# Patient Record
Sex: FEMALE | Race: WHITE | NOT HISPANIC OR LATINO | Employment: OTHER | URBAN - METROPOLITAN AREA
[De-identification: names, ages, dates, MRNs, and addresses within clinical notes are randomized per-mention and may not be internally consistent; named-entity substitution may affect disease eponyms.]

---

## 2017-05-17 ENCOUNTER — TELEPHONE (OUTPATIENT)
Dept: HEMATOLOGY ONCOLOGY | Facility: MEDICAL CENTER | Age: 77
End: 2017-05-17

## 2017-05-17 NOTE — TELEPHONE ENCOUNTER
Left voicemail for patient asking her to call me back at medical oncology. I am trying to find out who she was previously seen by for her cancer diagnosis so we may request medical records. Once records are received, patient needs to be scheduled for an oncology new patient appointment with Dr. Grubbs.     Ref: Joshua Garcia  Dx: Personal history of other malignant neoplasm of bronchus and lung,  Personal history of pulmonary embolism,  Cough, dyspnea

## 2017-05-18 NOTE — TELEPHONE ENCOUNTER
Left second voicemail for patient asking her to return my call at medical oncology. Our office did receive her prior medical records and she needs to be scheduled for an oncology new patient appt with Dr. Grubbs.  Ref: Justing Jose  Dx: person history of other malignant neoplasm of bronchus and lung,  Personal history of pulmonary embolism,  Cough, dyspnea

## 2017-05-24 NOTE — TELEPHONE ENCOUNTER
Third voicemail was left for patient asking her to return my call at medical oncology. She needs to be scheduled for an oncology new patient appointment with Dr. Grubbs. A letter will also be mailed to the patient as we have not been able to reach her by phone. Referring provider will also be notified.      Ref: Joshua Garcia  Dx: person histpry of other malignant neoplasm of bronchus and lung,  Person history of pulmonary embolism,  Cough, dyspnea

## 2017-05-31 ENCOUNTER — HOSPITAL ENCOUNTER (OUTPATIENT)
Dept: RADIOLOGY | Facility: MEDICAL CENTER | Age: 77
End: 2017-05-31
Attending: INTERNAL MEDICINE
Payer: MEDICARE

## 2017-05-31 DIAGNOSIS — C34.2 MALIGNANT NEOPLASM OF MIDDLE LOBE, BRONCHUS OR LUNG (HCC): ICD-10-CM

## 2017-05-31 DIAGNOSIS — R91.8 LUNG MASS: ICD-10-CM

## 2017-05-31 PROCEDURE — A9552 F18 FDG: HCPCS

## 2017-06-23 ENCOUNTER — HOSPITAL ENCOUNTER (OUTPATIENT)
Dept: LAB | Facility: MEDICAL CENTER | Age: 77
End: 2017-06-23
Attending: INTERNAL MEDICINE
Payer: MEDICARE

## 2017-06-23 LAB
APPEARANCE UR: CLEAR
BACTERIA #/AREA URNS HPF: ABNORMAL /HPF
BILIRUB UR QL STRIP.AUTO: NEGATIVE
COLOR UR: YELLOW
EPI CELLS #/AREA URNS HPF: ABNORMAL /HPF
GLUCOSE UR STRIP.AUTO-MCNC: NEGATIVE MG/DL
KETONES UR STRIP.AUTO-MCNC: NEGATIVE MG/DL
LEUKOCYTE ESTERASE UR QL STRIP.AUTO: ABNORMAL
MICRO URNS: ABNORMAL
NITRITE UR QL STRIP.AUTO: NEGATIVE
PH UR STRIP.AUTO: 6 [PH]
PROT UR QL STRIP: NEGATIVE MG/DL
RBC # URNS HPF: ABNORMAL /HPF
RBC UR QL AUTO: NEGATIVE
SP GR UR STRIP.AUTO: 1.01
WBC #/AREA URNS HPF: ABNORMAL /HPF

## 2017-06-23 PROCEDURE — 81001 URINALYSIS AUTO W/SCOPE: CPT

## 2017-06-23 PROCEDURE — 87086 URINE CULTURE/COLONY COUNT: CPT

## 2017-06-25 LAB
BACTERIA UR CULT: NORMAL
SIGNIFICANT IND 70042: NORMAL
SOURCE SOURCE: NORMAL

## 2017-07-11 ENCOUNTER — OFFICE VISIT (OUTPATIENT)
Dept: HEMATOLOGY ONCOLOGY | Facility: MEDICAL CENTER | Age: 77
End: 2017-07-11
Payer: MEDICARE

## 2017-07-11 VITALS
OXYGEN SATURATION: 92 % | WEIGHT: 146.28 LBS | DIASTOLIC BLOOD PRESSURE: 86 MMHG | BODY MASS INDEX: 26.92 KG/M2 | TEMPERATURE: 97.6 F | HEART RATE: 67 BPM | RESPIRATION RATE: 16 BRPM | SYSTOLIC BLOOD PRESSURE: 132 MMHG | HEIGHT: 62 IN

## 2017-07-11 DIAGNOSIS — C34.90 NON-SMALL CELL LUNG CANCER (NSCLC) (HCC): ICD-10-CM

## 2017-07-11 PROCEDURE — 99203 OFFICE O/P NEW LOW 30 MIN: CPT | Performed by: INTERNAL MEDICINE

## 2017-07-11 ASSESSMENT — PAIN SCALES - GENERAL: PAINLEVEL: 7=MODERATE-SEVERE PAIN

## 2017-07-11 NOTE — MR AVS SNAPSHOT
"Chauis Bhanu   2017 2:00 PM   Office Visit   MRN: [id number]    Department:  [facility]   Dept Phone:  [phone]    Description:  Female : 1940   Provider:  Bill Grubbs M.D.           Reason for Visit     New Patient           Allergies as of 2017     Allergen Noted Reactions    Tetracycline 2009   Anaphylaxis, Hives      You were diagnosed with     Non-small cell lung cancer (NSCLC) (CMS-HCC)   [[id number]]         Vital Signs     Blood Pressure Pulse Temperature Respirations Height Weight    132/86 mmHg 67 36.4 °C (97.6 °F) 16 1.575 m (5' 2\") 66.35 kg (146 lb 4.4 oz)    Body Mass Index Oxygen Saturation Smoking Status             26.75 kg/m2 92% Former Smoker         Basic Information     Date Of Birth Sex Race Ethnicity Preferred Language    1940 Female White Non- English      Your appointments     2017 10:00 AM   CT BODY WITH with [address] ED [address] 1   [facility] [address]    [address] [facility] [address] [phone]           Some exams require specific prep instructions that would have been given to you at time of scheduling. If you have any additional questions about the prep instructions, please call Imaging Scheduling at [phone] and press #2.            2017 11:20 AM   ONCOLOGY EST PATIENT 30 MIN with Bill Grubbs M.D.   [facility] (--)    [address]  [facility] [address] [phone]              Problem List              ICD-10-CM Priority Class Noted - Resolved    Non-small cell lung cancer (NSCLC) (CMS-HCC) C34.90   2017 - Present      Health Maintenance        Date Due Completion Dates    IMM DTaP/Tdap/Td Vaccine (1 - Tdap) 1958 ---    PAP SMEAR 1960 ---    MAMMOGRAM 1979 ---    COLONOSCOPY 1989 ---    IMM ZOSTER VACCINE 1999 ---    BONE DENSITY 2004 ---    IMM PNEUMOCOCCAL 65+ (ADULT) LOW/MEDIUM RISK SERIES (1 of 2 - PCV13) 2004 ---  "    IMM INFLUENZA (1) 9/1/2017 ---            Current Immunizations     No immunizations on file.      Below and/or attached are the medications your provider expects you to take. Review all of your home medications and newly ordered medications with your provider and/or pharmacist. Follow medication instructions as directed by your provider and/or pharmacist. Please keep your medication list with you and share with your provider. Update the information when medications are discontinued, doses are changed, or new medications (including over-the-counter products) are added; and carry medication information at all times in the event of emergency situations     Allergies:  TETRACYCLINE - Anaphylaxis,Hives               Medications  Valid as of: July 11, 2017 -  3:22 PM    Generic Name Brand Name Tablet Size Instructions for use    Folic Acid (Tab) FOLVITE 400 MCG Take  by mouth 3 times a day.        LevoFLOXacin (Tab) LEVAQUIN 250 MG Take 3 Tabs by mouth every day.        Levothyroxine Sodium (Tab) SYNTHROID 50 MCG Take  by mouth every day.        Methadone HCl (Tab) DOLOPHINE 10 MG Take  by mouth 3 times a day as needed for Mild Pain.        Methotrexate Sodium (Tab) methotrexate 2.5 MG Take 10 Tabs by mouth every 7 days.        Metoprolol Succinate (TABLET SR 24 HR) TOPROL XL 50 MG Take  by mouth 2 Times a Day.        Metoprolol Tartrate (Tab) LOPRESSOR 25 MG Take 1 Tab by mouth 2 times a day.        Morphine Sulfate (TABLET SR 12 HR) MS CONTIN 30 MG Take  by mouth 3 times a day.        Multiple Vitamin   Take  by mouth every day.        Oxycodone-Acetaminophen (Tab) PERCOCET  MG Take  by mouth 4 times a day.        PARoxetine HCl (Tab) PAXIL 20 MG Take  by mouth every day.        Rivaroxaban (Tab) XARELTO 20 MG Take 20 mg by mouth with dinner.        .                 Medicines prescribed today were sent to:     [id number] - [place], [place] - [address]  [facility] [address] [place] [address] [address]     [address] [place] [address]    Phone: [phone] Fax: [phone]    Open 24 Hours?: No      Medication refill instructions:       If your prescription bottle indicates you have medication refills left, it is not necessary to call your provider’s office. Please contact your pharmacy and they will refill your medication.    If your prescription bottle indicates you do not have any refills left, you may request refills at any time through one of the following ways: The online [employer] system (except Urgent Care), by calling your provider’s office, or by asking your pharmacy to contact your provider’s office with a refill request. Medication refills are processed only during regular business hours and may not be available until the next business day. Your provider may request additional information or to have a follow-up visit with you prior to refilling your medication.   *Please Note: Medication refills are assigned a new Rx number when refilled electronically. Your pharmacy may indicate that no refills were authorized even though a new prescription for the same medication is available at the pharmacy. Please request the medicine by name with the pharmacy before contacting your provider for a refill.        Your To Do List     Future Labs/Procedures Complete By Expires    CT-CHEST (THORAX) WITH  As directed 7/11/2018      Referral     A referral request has been sent to our patient care coordination department. Please allow 3-5 business days for us to process this request and contact you either by phone or mail. If you do not hear from us by the 5th business day, please call us at [phone].           [employer] Access Code: [email or web address]  Expires: 7/29/2017  4:10 AM    [employer]  A secure, online tool to manage your health information     [employer]® is a secure, online tool that connects you to your personalized health information from the privacy of your home -- day or night -  making it very easy for you to manage your healthcare. Once the activation process is completed, you can even access your medical information using the Tangible Play kalyani, which is available for free in the Apple Kalyani store or Google Play store.     Tang[employer] provides the following levels of access (as shown below):   My Chart Features   [facility] Doctor [facility]  Specialists [facility]  Urgent  Care Non-[facility]  Primary Care  Doctor   Email your healthcare team securely and privately 24/7 X X X    Manage appointments: schedule your next appointment; view details of past/upcoming appointments X      Request prescription refills. X      View recent personal medical records, including lab and immunizations X X X X   View health record, including health history, allergies, medications X X X X   Read reports about your outpatient visits, procedures, consult and ER notes X X X X   See your discharge summary, which is a recap of your hospital and/or ER visit that includes your diagnosis, lab results, and care plan. X X       How to register for [employer]:  1. Go to  [email or web address].  2. Click on the Sign Up Now box, which takes you to the New Member Sign Up page. You will need to provide the following information:  a. Enter your Tangible Play Access Code exactly as it appears at the top of this page. (You will not need to use this code after you’ve completed the sign-up process. If you do not sign up before the expiration date, you must request a new code.)   b. Enter your date of birth.   c. Enter your home email address.   d. Click Submit, and follow the next screen’s instructions.  3. Create a Tangible Play ID. This will be your Tangible Play login ID and cannot be changed, so think of one that is secure and easy to remember.  4. Create a Tangible Play password. You can change your password at any time.  5. Enter your Password Reset Question and Answer. This can be used at a later time if you forget your password.   6. Enter your e-mail  address. This allows you to receive e-mail notifications when new information is available in [employer] Mobile App.  7. Click Sign Up. You can now view your health information.    For assistance activating your [employer] Mobile App account, call [phone]

## 2017-07-11 NOTE — PROGRESS NOTES
Consult Note: Oncology    Date of consultation: 7/11/2017 2:33 PM    Referring provider: Joshua Garcia D.O.    Reason for consultation:     History of presenting illness:     Dear Joshua,    Thank you very much for allowing me to see  Idalia Drummond today . As you know she  is a 77 y.o. year old female with a prior oncological history of squamous cell lung carcinoma as below-    2012-  Non-small cell lung cancer, squamous histology. Either two separate T1N0M0 (stage IA) tumors or  one cancer with two foci in different ipsilateral lobes (T4N0M0, stage IIIA).  Initial pathology report not available.  8/10/12 through 8/23/12: Stereotactic body radiation therapy to a tumor in the right middle lobe (1.4  cm, SUV 3.2) and a tumor in the right lower lobe (2.6 cm, SUV 8.6), 60 Gy in 10 fractions.  3/25/13:  seen by medical oncology, Dr. Spencer  10/15- Seen by . On continued observation with surveillance scans. Since 2013, she had mild increase in size of the right upper lobe nodules from around 3-4 mm, 2 around 4-5 mm.    She usually gets her scans done outside. She had some nonspecific pain symptoms for the past year. She ended up getting a chest CT in March 2017 for COPD exacerbation/pneumonia at [facility]. There was concern for a new lung nodule according to the patient's information. She called Dr. Chang's office and a PET scan was done on 5/31/2017 here-    There is no abnormal FDG uptake within the area of scarring at the site of previous right lower lobe lung mass.  There is no abnormal uptake within a spiculated right upper lobe mass with surrounding groundglass opacity. However, there is a new solid spiculated component compared with prior CT from 2012 which still makes this suspicious for a low-grade adenocarcinoma.    She was referred here for further evaluation. She feels better from a respiratory standpoint at this time. She has a lot of anxiety as to whether her lung cancer is  relapsing.    Past Medical History:    Past Medical History   Diagnosis Date   • Rheumatoid arthritis(714.0) 2004     generalized   • EMPHYSEMA 2008     home O2 as needed   • Unspecified urinary incontinence 2009     just started using premarin vaginal cream    • Heart valve disease 1985     mitral valve prolapse    • Breath shortness      home O2 prn    • Personal history of venous thrombosis and embolism      hx of blood clot in each lung and takes coumadin    • Unspecified disorder of thyroid 1995     hypothyroid   • Pain      chronic pain with RA controlled with meds    • Hypertension      controlled with medication    • Cancer      lung       Past surgical history:    Past Surgical History   Procedure Laterality Date   • Wide excision  9/18/08     Performed by GERRY CASTILLO at [facility]   • Flap closure  9/18/08     Performed by GERRY CASTILLO at [facility]   • Biopsy breast  1990     right breast biopsy  (benign)   • Mass excision general  2008     left upper arm  (cancerous )   • Foot surgery  1990     mass removed from right foot   • Cholecystectomy  2000     laparoscopically    • Hysterectomy radical  1974     abdominal    • Wide excision  8/7/2009     Performed by AMADO HALL at SUR[facility]       Allergies:  Tetracycline    Medications:    Current Outpatient Prescriptions   Medication Sig Dispense Refill   • metoprolol (LOPRESSOR) 25 MG Tab Take 1 Tab by mouth 2 times a day. 60 Tab 0   • rivaroxaban (XARELTO) 20 MG Tab tablet Take 20 mg by mouth with dinner.     • levofloxacin (LEVAQUIN) 250 MG TABS Take 3 Tabs by mouth every day. 5 Tab 0   • LEVOTHYROXINE 50 MCG TABS Take  by mouth every day.     • PERCOCET  MG TABS Take  by mouth 4 times a day.     • FOLIC ACID 400 MCG tablet Take  by mouth 3 times a day.     • MULTIVITAMINS PO Take  by mouth every day.     • TOPROL XL 50 MG TB24 Take  by mouth 2 Times a Day.     • METHOTREXATE 2.5 MG tablet Take 10  "Tabs by mouth every 7 days.     • PAXIL 20 MG TABS Take  by mouth every day.     • MORPHINE SR 30 MG TB12 Take  by mouth 3 times a day.     • METHADONE 10 MG TABS Take  by mouth 3 times a day as needed for Mild Pain.       No current facility-administered medications for this visit.       Social History:     Social History     Social History   • Marital Status:      Spouse Name: N/A   • Number of Children: N/A   • Years of Education: N/A     Occupational History   • Not on file.     Social History Main Topics   • Smoking status: Former Smoker     Types: Cigarettes   • Smokeless tobacco: Never Used      Comment: Quit in 1989   • Alcohol Use: No   • Drug Use: No   • Sexual Activity: Not Currently     Other Topics Concern   • Not on file     Social History Narrative       Family History:   No family history on file.    Review of Systems:  All other review of systems are negative except what was mentioned above in the HPI.    Constitutional: No fever, chills, weight loss , mild malaise/fatigue.    HEENT: No new auditory or visual complaints. No sore throat and neck pain.     Respiratory:No new cough, sputum production, she has some chronic shortness of breath and wheezing.    Cardiovascular: No new chest pain, palpitations, orthopnea and leg swelling.    Gastrointestinal: No heartburn, nausea, vomiting ,abdominal pain, hematochezia or melena     Genitourinary: Negative for dysuria, hematuria    Musculoskeletal: No new arthralgias or myalgias   Skin: Negative for rash and itching.    Neurological: Negative for focal weakness or headaches.    Endo/Heme/Allergies: No abnormal bleed/bruise.    Psychiatric/Behavioral: No new depression/anxiety.    Physical Exam:  Vitals:   /86 mmHg  Pulse 67  Temp(Src) 36.4 °C (97.6 °F)  Resp 16  Ht 1.575 m (5' 2\")  Wt 66.35 kg (146 lb 4.4 oz)  BMI 26.75 kg/m2  SpO2 92%    General: Not in acute distress, alert and oriented x 3  HEENT: No pallor, icterus. Oropharynx clear. "   Neck: Supple, no palpable masses.  Lymph nodes: No palpable cervical, supraclavicular, axillary or inguinal lymphadenopathy.    CVS: regular rate and rhythm, no rubs or gallops  RESP: Clear to auscultate bilaterally, no wheezing or crackles.   ABD: Soft, non tender, non distended, positive bowel sounds, no palpable organomegaly  EXT: No edema or cyanosis  CNS: Alert and oriented x3, No focal deficits.  Skin- No rash      Labs:   No results for input(s): RBC, HEMOGLOBIN, HEMATOCRIT, PLATELETCT, PROTHROMBTM, APTT, INR, IRON, FERRITIN, TOTIRONBC in the last 72 hours.  Lab Results   Component Value Date/Time    SODIUM 137 09/18/2008 07:00 AM    POTASSIUM 4.3 09/18/2008 07:00 AM    CHLORIDE 105 09/18/2008 07:00 AM    CO2 24 09/18/2008 07:00 AM    GLUCOSE 98 09/18/2008 07:00 AM    BUN 15 09/18/2008 07:00 AM    CREATININE 0.83 10/03/2012 11:35 AM        Assessment and Plan:  History of multifocal squamous cell lung carcinoma involving the right middle lobe and right lower lobe- she had stereotactic radiation back in 2012. She has been having a surveillance CT scan since then. She had a recent outside CT scan and there was some concern as to whether she is having progression. She has a lot of anxiety about this. I will get the official report and also the CT images. Reassured her that the most recent PET scan does not show any clearcut evidence of progression. The scarring at the right lower lobe mass does not have any pet uptake. She also had some residual scarring at the upper lobe mass with some spiculated component. Again, there is no significant pet uptake here. According to Dr. Chang's note, overall the size has increased by one or 2 mm over the past 5 years. Reassured her that at this point, we can safely wait and watch. I will order a follow-up CT scan in 6 months from now for surveillance.    History of DVT .he has some type of hypercoagulable problem and is on Xarelto long-term.    She agreed and verbalized  her agreement and understanding with the current plan.  I answered all questions and concerns she has at this time.              Thank you for allowing me to participate in her care.    Please note that this dictation was created using voice recognition software. I have made every reasonable attempt to correct obvious errors, but I expect that there are errors of grammar and possibly content that I did not discover before finalizing the note.      SIGNATURES:  Bill Grubbs    CC:  KALEY Neri Justin, D.O.

## 2017-07-17 ENCOUNTER — TELEPHONE (OUTPATIENT)
Dept: HEMATOLOGY ONCOLOGY | Facility: MEDICAL CENTER | Age: 77
End: 2017-07-17

## 2017-07-17 NOTE — TELEPHONE ENCOUNTER
Nutrition Services:    RD referral received, pt states she is does not believe she needs a consultation at this time. Left RD contact information with pt to call and set up appointment if decides otherwise. RD available PRN.

## 2017-08-01 ENCOUNTER — HOSPITAL ENCOUNTER (OUTPATIENT)
Dept: RADIOLOGY | Facility: MEDICAL CENTER | Age: 77
End: 2017-08-01

## 2017-09-29 ENCOUNTER — TELEPHONE (OUTPATIENT)
Dept: HEMATOLOGY ONCOLOGY | Facility: MEDICAL CENTER | Age: 77
End: 2017-09-29

## 2017-09-29 NOTE — TELEPHONE ENCOUNTER
Patient called to cancel her CT Scan and appointment with Dr. Grubbs. Per patient she will be seeing another physician.

## 2017-10-17 ENCOUNTER — HOSPITAL ENCOUNTER (OUTPATIENT)
Dept: LAB | Facility: MEDICAL CENTER | Age: 77
End: 2017-10-17
Attending: RADIOLOGY
Payer: MEDICARE

## 2017-10-17 LAB
BUN SERPL-MCNC: 12 MG/DL (ref 8–22)
CREAT SERPL-MCNC: 0.86 MG/DL (ref 0.5–1.4)
GFR SERPL CREATININE-BSD FRML MDRD: >60 ML/MIN/1.73 M 2

## 2017-10-17 PROCEDURE — 82565 ASSAY OF CREATININE: CPT

## 2017-10-17 PROCEDURE — 36415 COLL VENOUS BLD VENIPUNCTURE: CPT

## 2017-10-17 PROCEDURE — 84520 ASSAY OF UREA NITROGEN: CPT

## 2018-05-25 ENCOUNTER — HOSPITAL ENCOUNTER (OUTPATIENT)
Dept: LAB | Facility: MEDICAL CENTER | Age: 78
End: 2018-05-25
Attending: RADIOLOGY
Payer: MEDICARE

## 2018-05-25 LAB
ALBUMIN SERPL BCP-MCNC: 4.3 G/DL (ref 3.2–4.9)
ALBUMIN/GLOB SERPL: 1.3 G/DL
ALP SERPL-CCNC: 65 U/L (ref 30–99)
ALT SERPL-CCNC: 28 U/L (ref 2–50)
ANION GAP SERPL CALC-SCNC: 12 MMOL/L (ref 0–11.9)
AST SERPL-CCNC: 34 U/L (ref 12–45)
BASOPHILS # BLD AUTO: 0.9 % (ref 0–1.8)
BASOPHILS # BLD: 0.09 K/UL (ref 0–0.12)
BILIRUB SERPL-MCNC: 0.4 MG/DL (ref 0.1–1.5)
BUN SERPL-MCNC: 18 MG/DL (ref 8–22)
CALCIUM SERPL-MCNC: 10.2 MG/DL (ref 8.5–10.5)
CHLORIDE SERPL-SCNC: 96 MMOL/L (ref 96–112)
CO2 SERPL-SCNC: 28 MMOL/L (ref 20–33)
CREAT SERPL-MCNC: 1.01 MG/DL (ref 0.5–1.4)
EOSINOPHIL # BLD AUTO: 0.04 K/UL (ref 0–0.51)
EOSINOPHIL NFR BLD: 0.4 % (ref 0–6.9)
ERYTHROCYTE [DISTWIDTH] IN BLOOD BY AUTOMATED COUNT: 44.9 FL (ref 35.9–50)
GLOBULIN SER CALC-MCNC: 3.3 G/DL (ref 1.9–3.5)
GLUCOSE SERPL-MCNC: 120 MG/DL (ref 65–99)
HCT VFR BLD AUTO: 50.3 % (ref 37–47)
HGB BLD-MCNC: 16.2 G/DL (ref 12–16)
IMM GRANULOCYTES # BLD AUTO: 0.05 K/UL (ref 0–0.11)
IMM GRANULOCYTES NFR BLD AUTO: 0.5 % (ref 0–0.9)
LYMPHOCYTES # BLD AUTO: 1.73 K/UL (ref 1–4.8)
LYMPHOCYTES NFR BLD: 17.8 % (ref 22–41)
MCH RBC QN AUTO: 30.6 PG (ref 27–33)
MCHC RBC AUTO-ENTMCNC: 32.2 G/DL (ref 33.6–35)
MCV RBC AUTO: 95.1 FL (ref 81.4–97.8)
MONOCYTES # BLD AUTO: 0.66 K/UL (ref 0–0.85)
MONOCYTES NFR BLD AUTO: 6.8 % (ref 0–13.4)
NEUTROPHILS # BLD AUTO: 7.15 K/UL (ref 2–7.15)
NEUTROPHILS NFR BLD: 73.6 % (ref 44–72)
NRBC # BLD AUTO: 0 K/UL
NRBC BLD-RTO: 0 /100 WBC
PLATELET # BLD AUTO: 388 K/UL (ref 164–446)
PMV BLD AUTO: 9.4 FL (ref 9–12.9)
POTASSIUM SERPL-SCNC: 4.3 MMOL/L (ref 3.6–5.5)
PROT SERPL-MCNC: 7.6 G/DL (ref 6–8.2)
RBC # BLD AUTO: 5.29 M/UL (ref 4.2–5.4)
SODIUM SERPL-SCNC: 136 MMOL/L (ref 135–145)
WBC # BLD AUTO: 9.7 K/UL (ref 4.8–10.8)

## 2018-05-25 PROCEDURE — 36415 COLL VENOUS BLD VENIPUNCTURE: CPT

## 2018-05-25 PROCEDURE — 84479 ASSAY OF THYROID (T3 OR T4): CPT

## 2018-05-25 PROCEDURE — 85025 COMPLETE CBC W/AUTO DIFF WBC: CPT

## 2018-05-25 PROCEDURE — 84436 ASSAY OF TOTAL THYROXINE: CPT

## 2018-05-25 PROCEDURE — 80053 COMPREHEN METABOLIC PANEL: CPT

## 2018-05-27 LAB
FT4I SERPL CALC-MCNC: 2.7 UNITS (ref 1.7–4.2)
T3RU NFR SERPL: 35 % (ref 28–41)
T4 SERPL-MCNC: 7.72 UG/DL (ref 5.1–14.1)

## 2018-06-01 ENCOUNTER — HOSPITAL ENCOUNTER (OUTPATIENT)
Dept: LAB | Facility: MEDICAL CENTER | Age: 78
End: 2018-06-01
Attending: FAMILY MEDICINE
Payer: MEDICARE

## 2018-06-01 LAB
T4 FREE SERPL-MCNC: 0.96 NG/DL (ref 0.53–1.43)
TSH SERPL DL<=0.005 MIU/L-ACNC: 3.01 UIU/ML (ref 0.38–5.33)

## 2018-06-01 PROCEDURE — 36415 COLL VENOUS BLD VENIPUNCTURE: CPT

## 2018-06-01 PROCEDURE — 84439 ASSAY OF FREE THYROXINE: CPT

## 2018-06-01 PROCEDURE — 84443 ASSAY THYROID STIM HORMONE: CPT

## 2019-05-09 ENCOUNTER — NON-PROVIDER VISIT (OUTPATIENT)
Dept: URGENT CARE | Facility: PHYSICIAN GROUP | Age: 79
End: 2019-05-09
Payer: MEDICARE

## 2019-05-09 ENCOUNTER — APPOINTMENT (OUTPATIENT)
Dept: RADIOLOGY | Facility: IMAGING CENTER | Age: 79
End: 2019-05-09
Attending: PHYSICIAN ASSISTANT
Payer: MEDICARE

## 2019-05-09 DIAGNOSIS — R06.02 SHORTNESS OF BREATH: ICD-10-CM

## 2019-05-09 PROCEDURE — 71046 X-RAY EXAM CHEST 2 VIEWS: CPT | Mod: TC,FY | Performed by: FAMILY MEDICINE

## 2019-08-06 ENCOUNTER — HOSPITAL ENCOUNTER (OUTPATIENT)
Dept: LAB | Facility: MEDICAL CENTER | Age: 79
End: 2019-08-06
Attending: RADIOLOGY
Payer: MEDICARE

## 2019-08-07 ENCOUNTER — HOSPITAL ENCOUNTER (OUTPATIENT)
Dept: LAB | Facility: MEDICAL CENTER | Age: 79
End: 2019-08-07
Attending: RADIOLOGY
Payer: MEDICARE

## 2019-08-07 LAB
ALBUMIN SERPL BCP-MCNC: 4.6 G/DL (ref 3.2–4.9)
ALBUMIN/GLOB SERPL: 1.5 G/DL
ALP SERPL-CCNC: 51 U/L (ref 30–99)
ALT SERPL-CCNC: 22 U/L (ref 2–50)
ANION GAP SERPL CALC-SCNC: 11 MMOL/L (ref 0–11.9)
AST SERPL-CCNC: 23 U/L (ref 12–45)
BASOPHILS # BLD AUTO: 1.3 % (ref 0–1.8)
BASOPHILS # BLD: 0.13 K/UL (ref 0–0.12)
BILIRUB SERPL-MCNC: 0.5 MG/DL (ref 0.1–1.5)
BUN SERPL-MCNC: 21 MG/DL (ref 8–22)
CALCIUM SERPL-MCNC: 10 MG/DL (ref 8.5–10.5)
CHLORIDE SERPL-SCNC: 99 MMOL/L (ref 96–112)
CO2 SERPL-SCNC: 30 MMOL/L (ref 20–33)
CREAT SERPL-MCNC: 1.01 MG/DL (ref 0.5–1.4)
EOSINOPHIL # BLD AUTO: 0.1 K/UL (ref 0–0.51)
EOSINOPHIL NFR BLD: 1 % (ref 0–6.9)
ERYTHROCYTE [DISTWIDTH] IN BLOOD BY AUTOMATED COUNT: 49.3 FL (ref 35.9–50)
FASTING STATUS PATIENT QL REPORTED: NORMAL
GLOBULIN SER CALC-MCNC: 3.1 G/DL (ref 1.9–3.5)
GLUCOSE SERPL-MCNC: 146 MG/DL (ref 65–99)
HCT VFR BLD AUTO: 50.2 % (ref 37–47)
HGB BLD-MCNC: 15.3 G/DL (ref 12–16)
IMM GRANULOCYTES # BLD AUTO: 0.06 K/UL (ref 0–0.11)
IMM GRANULOCYTES NFR BLD AUTO: 0.6 % (ref 0–0.9)
LYMPHOCYTES # BLD AUTO: 1.18 K/UL (ref 1–4.8)
LYMPHOCYTES NFR BLD: 11.6 % (ref 22–41)
MCH RBC QN AUTO: 30.1 PG (ref 27–33)
MCHC RBC AUTO-ENTMCNC: 30.5 G/DL (ref 33.6–35)
MCV RBC AUTO: 98.6 FL (ref 81.4–97.8)
MONOCYTES # BLD AUTO: 0.5 K/UL (ref 0–0.85)
MONOCYTES NFR BLD AUTO: 4.9 % (ref 0–13.4)
NEUTROPHILS # BLD AUTO: 8.2 K/UL (ref 2–7.15)
NEUTROPHILS NFR BLD: 80.6 % (ref 44–72)
NRBC # BLD AUTO: 0 K/UL
NRBC BLD-RTO: 0 /100 WBC
PLATELET # BLD AUTO: 274 K/UL (ref 164–446)
PMV BLD AUTO: 9.9 FL (ref 9–12.9)
POTASSIUM SERPL-SCNC: 4 MMOL/L (ref 3.6–5.5)
PROT SERPL-MCNC: 7.7 G/DL (ref 6–8.2)
RBC # BLD AUTO: 5.09 M/UL (ref 4.2–5.4)
SODIUM SERPL-SCNC: 140 MMOL/L (ref 135–145)
T3 SERPL-MCNC: 90.4 NG/DL (ref 60–181)
T4 SERPL-MCNC: 6.5 UG/DL (ref 4–12)
TSH SERPL DL<=0.005 MIU/L-ACNC: 2.28 UIU/ML (ref 0.38–5.33)
WBC # BLD AUTO: 10.2 K/UL (ref 4.8–10.8)

## 2019-08-07 PROCEDURE — 84436 ASSAY OF TOTAL THYROXINE: CPT

## 2019-08-07 PROCEDURE — 36415 COLL VENOUS BLD VENIPUNCTURE: CPT

## 2019-08-07 PROCEDURE — 80053 COMPREHEN METABOLIC PANEL: CPT

## 2019-08-07 PROCEDURE — 85025 COMPLETE CBC W/AUTO DIFF WBC: CPT

## 2019-08-07 PROCEDURE — 84480 ASSAY TRIIODOTHYRONINE (T3): CPT

## 2019-08-07 PROCEDURE — 84443 ASSAY THYROID STIM HORMONE: CPT

## 2020-06-02 ENCOUNTER — APPOINTMENT (RX ONLY)
Dept: URBAN - NONMETROPOLITAN AREA CLINIC 15 | Facility: CLINIC | Age: 80
Setting detail: DERMATOLOGY
End: 2020-06-02

## 2020-06-02 DIAGNOSIS — L73.8 OTHER SPECIFIED FOLLICULAR DISORDERS: ICD-10-CM

## 2020-06-02 DIAGNOSIS — Z71.89 OTHER SPECIFIED COUNSELING: ICD-10-CM

## 2020-06-02 DIAGNOSIS — L81.4 OTHER MELANIN HYPERPIGMENTATION: ICD-10-CM

## 2020-06-02 DIAGNOSIS — L82.1 OTHER SEBORRHEIC KERATOSIS: ICD-10-CM

## 2020-06-02 PROCEDURE — ? COUNSELING

## 2020-06-02 PROCEDURE — ? LIQUID NITROGEN

## 2020-06-02 PROCEDURE — 99202 OFFICE O/P NEW SF 15 MIN: CPT

## 2020-06-02 ASSESSMENT — LOCATION SIMPLE DESCRIPTION DERM
LOCATION SIMPLE: NOSE
LOCATION SIMPLE: RIGHT FOREARM
LOCATION SIMPLE: LEFT FOREARM
LOCATION SIMPLE: RIGHT UPPER BACK
LOCATION SIMPLE: RIGHT FOREHEAD

## 2020-06-02 ASSESSMENT — LOCATION ZONE DERM
LOCATION ZONE: FACE
LOCATION ZONE: NOSE
LOCATION ZONE: TRUNK
LOCATION ZONE: ARM

## 2020-06-02 ASSESSMENT — LOCATION DETAILED DESCRIPTION DERM
LOCATION DETAILED: RIGHT PROXIMAL DORSAL FOREARM
LOCATION DETAILED: NASAL DORSUM
LOCATION DETAILED: LEFT PROXIMAL DORSAL FOREARM
LOCATION DETAILED: RIGHT MEDIAL UPPER BACK
LOCATION DETAILED: RIGHT INFERIOR MEDIAL FOREHEAD

## 2020-06-02 NOTE — PROCEDURE: COUNSELING
Detail Level: Zone
Patient Specific Counseling (Will Not Stick From Patient To Patient): Discussed that a punch bx can be done, which pt would like to defer.
Detail Level: Detailed

## 2020-06-02 NOTE — PROCEDURE: LIQUID NITROGEN
Bill Insurance (You Assume Risk Of Denial Or Audit By Selecting Yes): No
Consent: The patient's consent was obtained including but not limited to risks of crusting, scabbing, blistering, scarring, darker or lighter pigmentary change, recurrence, incomplete removal and infection.
Detail Level: Detailed
Duration Of Freeze Thaw-Cycle (Seconds): 0
Medical Necessity Clause: This procedure was medically necessary because the lesions that were treated were:  If lesion does not resolve, bx is needed.
Medical Necessity Information: It is in your best interest to select a reason for this procedure from the list below. All of these items fulfill various CMS LCD requirements except the new and changing color options.
Post-Care Instructions: I reviewed with the patient in detail post-care instructions. Patient is to wear sunprotection, and avoid picking at any of the treated lesions. Pt may apply Vaseline to crusted or scabbing areas.

## 2021-10-24 ENCOUNTER — APPOINTMENT (OUTPATIENT)
Dept: RADIOLOGY | Facility: MEDICAL CENTER | Age: 81
DRG: 872 | End: 2021-10-24
Attending: PHYSICIAN ASSISTANT
Payer: MEDICARE

## 2021-10-24 ENCOUNTER — HOSPITAL ENCOUNTER (INPATIENT)
Facility: MEDICAL CENTER | Age: 81
LOS: 16 days | DRG: 872 | End: 2021-11-09
Attending: STUDENT IN AN ORGANIZED HEALTH CARE EDUCATION/TRAINING PROGRAM | Admitting: STUDENT IN AN ORGANIZED HEALTH CARE EDUCATION/TRAINING PROGRAM
Payer: MEDICARE

## 2021-10-24 DIAGNOSIS — G89.4 CHRONIC PAIN SYNDROME: ICD-10-CM

## 2021-10-24 DIAGNOSIS — J44.9 CHRONIC OBSTRUCTIVE PULMONARY DISEASE, UNSPECIFIED COPD TYPE (HCC): ICD-10-CM

## 2021-10-24 DIAGNOSIS — M46.26 INFECTION OF LUMBAR SPINE (HCC): ICD-10-CM

## 2021-10-24 DIAGNOSIS — A41.9 SEPSIS WITHOUT ACUTE ORGAN DYSFUNCTION, DUE TO UNSPECIFIED ORGANISM (HCC): ICD-10-CM

## 2021-10-24 DIAGNOSIS — S42.034A CLOSED NONDISPLACED FRACTURE OF ACROMIAL END OF RIGHT CLAVICLE, INITIAL ENCOUNTER: ICD-10-CM

## 2021-10-24 DIAGNOSIS — C34.90 NON-SMALL CELL LUNG CANCER, UNSPECIFIED LATERALITY (HCC): ICD-10-CM

## 2021-10-24 PROBLEM — E03.9 ACQUIRED HYPOTHYROIDISM: Status: ACTIVE | Noted: 2021-10-24

## 2021-10-24 PROBLEM — G93.40 ENCEPHALOPATHY: Status: ACTIVE | Noted: 2021-10-24

## 2021-10-24 PROBLEM — I48.0 PAROXYSMAL ATRIAL FIBRILLATION (HCC): Status: ACTIVE | Noted: 2021-10-24

## 2021-10-24 LAB
ALBUMIN SERPL BCP-MCNC: 3.2 G/DL (ref 3.2–4.9)
ALBUMIN/GLOB SERPL: 1.3 G/DL
ALP SERPL-CCNC: 148 U/L (ref 30–99)
ALT SERPL-CCNC: 11 U/L (ref 2–50)
ANION GAP SERPL CALC-SCNC: 12 MMOL/L (ref 7–16)
APPEARANCE UR: ABNORMAL
AST SERPL-CCNC: 15 U/L (ref 12–45)
BACTERIA #/AREA URNS HPF: ABNORMAL /HPF
BASOPHILS # BLD AUTO: 1 % (ref 0–1.8)
BASOPHILS # BLD: 0.12 K/UL (ref 0–0.12)
BILIRUB SERPL-MCNC: 0.2 MG/DL (ref 0.1–1.5)
BILIRUB UR QL STRIP.AUTO: NEGATIVE
BUN SERPL-MCNC: 10 MG/DL (ref 8–22)
CALCIUM SERPL-MCNC: 8.7 MG/DL (ref 8.5–10.5)
CHLORIDE SERPL-SCNC: 106 MMOL/L (ref 96–112)
CO2 SERPL-SCNC: 21 MMOL/L (ref 20–33)
COLOR UR: YELLOW
CREAT SERPL-MCNC: 0.61 MG/DL (ref 0.5–1.4)
EOSINOPHIL # BLD AUTO: 0.29 K/UL (ref 0–0.51)
EOSINOPHIL NFR BLD: 2.4 % (ref 0–6.9)
EPI CELLS #/AREA URNS HPF: ABNORMAL /HPF
ERYTHROCYTE [DISTWIDTH] IN BLOOD BY AUTOMATED COUNT: 50.7 FL (ref 35.9–50)
GLOBULIN SER CALC-MCNC: 2.4 G/DL (ref 1.9–3.5)
GLUCOSE SERPL-MCNC: 117 MG/DL (ref 65–99)
GLUCOSE UR STRIP.AUTO-MCNC: NEGATIVE MG/DL
HCT VFR BLD AUTO: 36.9 % (ref 37–47)
HGB BLD-MCNC: 11.1 G/DL (ref 12–16)
HYALINE CASTS #/AREA URNS LPF: ABNORMAL /LPF
IMM GRANULOCYTES # BLD AUTO: 0.08 K/UL (ref 0–0.11)
IMM GRANULOCYTES NFR BLD AUTO: 0.7 % (ref 0–0.9)
INR PPP: 1.01 (ref 0.87–1.13)
KETONES UR STRIP.AUTO-MCNC: 40 MG/DL
LACTATE BLD-SCNC: 1.1 MMOL/L (ref 0.5–2)
LACTATE BLD-SCNC: 1.2 MMOL/L (ref 0.5–2)
LACTATE BLD-SCNC: 1.2 MMOL/L (ref 0.5–2)
LACTATE BLD-SCNC: 1.5 MMOL/L (ref 0.5–2)
LEUKOCYTE ESTERASE UR QL STRIP.AUTO: ABNORMAL
LYMPHOCYTES # BLD AUTO: 0.96 K/UL (ref 1–4.8)
LYMPHOCYTES NFR BLD: 8.1 % (ref 22–41)
MAGNESIUM SERPL-MCNC: 1.6 MG/DL (ref 1.5–2.5)
MCH RBC QN AUTO: 27.3 PG (ref 27–33)
MCHC RBC AUTO-ENTMCNC: 30.1 G/DL (ref 33.6–35)
MCV RBC AUTO: 90.7 FL (ref 81.4–97.8)
MICRO URNS: ABNORMAL
MONOCYTES # BLD AUTO: 1.06 K/UL (ref 0–0.85)
MONOCYTES NFR BLD AUTO: 8.9 % (ref 0–13.4)
NEUTROPHILS # BLD AUTO: 9.34 K/UL (ref 2–7.15)
NEUTROPHILS NFR BLD: 78.9 % (ref 44–72)
NITRITE UR QL STRIP.AUTO: POSITIVE
NRBC # BLD AUTO: 0 K/UL
NRBC BLD-RTO: 0 /100 WBC
PH UR STRIP.AUTO: 5.5 [PH] (ref 5–8)
PHOSPHATE SERPL-MCNC: 2.2 MG/DL (ref 2.5–4.5)
PLATELET # BLD AUTO: 353 K/UL (ref 164–446)
PMV BLD AUTO: 9 FL (ref 9–12.9)
POTASSIUM SERPL-SCNC: 3.7 MMOL/L (ref 3.6–5.5)
PROCALCITONIN SERPL-MCNC: 0.09 NG/ML
PROT SERPL-MCNC: 5.6 G/DL (ref 6–8.2)
PROT UR QL STRIP: 30 MG/DL
PROTHROMBIN TIME: 13 SEC (ref 12–14.6)
RBC # BLD AUTO: 4.07 M/UL (ref 4.2–5.4)
RBC # URNS HPF: ABNORMAL /HPF
RBC UR QL AUTO: ABNORMAL
RENAL EPI CELLS #/AREA URNS HPF: ABNORMAL /HPF
SODIUM SERPL-SCNC: 139 MMOL/L (ref 135–145)
SP GR UR STRIP.AUTO: 1.04
TRANS CELLS #/AREA URNS HPF: ABNORMAL /HPF
UROBILINOGEN UR STRIP.AUTO-MCNC: 0.2 MG/DL
WBC # BLD AUTO: 11.9 K/UL (ref 4.8–10.8)
WBC #/AREA URNS HPF: ABNORMAL /HPF
YEAST BUDDING URNS QL: PRESENT /HPF

## 2021-10-24 PROCEDURE — A9270 NON-COVERED ITEM OR SERVICE: HCPCS | Performed by: STUDENT IN AN ORGANIZED HEALTH CARE EDUCATION/TRAINING PROGRAM

## 2021-10-24 PROCEDURE — 84145 PROCALCITONIN (PCT): CPT

## 2021-10-24 PROCEDURE — 80053 COMPREHEN METABOLIC PANEL: CPT

## 2021-10-24 PROCEDURE — 85025 COMPLETE CBC W/AUTO DIFF WBC: CPT

## 2021-10-24 PROCEDURE — 700111 HCHG RX REV CODE 636 W/ 250 OVERRIDE (IP): Performed by: STUDENT IN AN ORGANIZED HEALTH CARE EDUCATION/TRAINING PROGRAM

## 2021-10-24 PROCEDURE — 97166 OT EVAL MOD COMPLEX 45 MIN: CPT

## 2021-10-24 PROCEDURE — 87077 CULTURE AEROBIC IDENTIFY: CPT

## 2021-10-24 PROCEDURE — 81001 URINALYSIS AUTO W/SCOPE: CPT

## 2021-10-24 PROCEDURE — 700111 HCHG RX REV CODE 636 W/ 250 OVERRIDE (IP): Performed by: INTERNAL MEDICINE

## 2021-10-24 PROCEDURE — 700102 HCHG RX REV CODE 250 W/ 637 OVERRIDE(OP): Performed by: INTERNAL MEDICINE

## 2021-10-24 PROCEDURE — 93005 ELECTROCARDIOGRAM TRACING: CPT | Performed by: STUDENT IN AN ORGANIZED HEALTH CARE EDUCATION/TRAINING PROGRAM

## 2021-10-24 PROCEDURE — A9270 NON-COVERED ITEM OR SERVICE: HCPCS | Performed by: INTERNAL MEDICINE

## 2021-10-24 PROCEDURE — 87186 SC STD MICRODIL/AGAR DIL: CPT

## 2021-10-24 PROCEDURE — 97162 PT EVAL MOD COMPLEX 30 MIN: CPT

## 2021-10-24 PROCEDURE — 36415 COLL VENOUS BLD VENIPUNCTURE: CPT

## 2021-10-24 PROCEDURE — 700105 HCHG RX REV CODE 258: Performed by: STUDENT IN AN ORGANIZED HEALTH CARE EDUCATION/TRAINING PROGRAM

## 2021-10-24 PROCEDURE — 97530 THERAPEUTIC ACTIVITIES: CPT

## 2021-10-24 PROCEDURE — 85610 PROTHROMBIN TIME: CPT

## 2021-10-24 PROCEDURE — 770001 HCHG ROOM/CARE - MED/SURG/GYN PRIV*

## 2021-10-24 PROCEDURE — 99223 1ST HOSP IP/OBS HIGH 75: CPT | Mod: AI | Performed by: STUDENT IN AN ORGANIZED HEALTH CARE EDUCATION/TRAINING PROGRAM

## 2021-10-24 PROCEDURE — 83605 ASSAY OF LACTIC ACID: CPT

## 2021-10-24 PROCEDURE — 84100 ASSAY OF PHOSPHORUS: CPT

## 2021-10-24 PROCEDURE — 83735 ASSAY OF MAGNESIUM: CPT

## 2021-10-24 PROCEDURE — 73030 X-RAY EXAM OF SHOULDER: CPT | Mod: RT

## 2021-10-24 PROCEDURE — 700102 HCHG RX REV CODE 250 W/ 637 OVERRIDE(OP): Performed by: STUDENT IN AN ORGANIZED HEALTH CARE EDUCATION/TRAINING PROGRAM

## 2021-10-24 PROCEDURE — 87086 URINE CULTURE/COLONY COUNT: CPT

## 2021-10-24 RX ORDER — OXYCODONE HYDROCHLORIDE 5 MG/1
5 TABLET ORAL
Status: DISCONTINUED | OUTPATIENT
Start: 2021-10-24 | End: 2021-11-09 | Stop reason: HOSPADM

## 2021-10-24 RX ORDER — BISACODYL 10 MG
10 SUPPOSITORY, RECTAL RECTAL
Status: DISCONTINUED | OUTPATIENT
Start: 2021-10-24 | End: 2021-11-09 | Stop reason: HOSPADM

## 2021-10-24 RX ORDER — MAGNESIUM SULFATE HEPTAHYDRATE 40 MG/ML
2 INJECTION, SOLUTION INTRAVENOUS ONCE
Status: COMPLETED | OUTPATIENT
Start: 2021-10-24 | End: 2021-10-24

## 2021-10-24 RX ORDER — DIAZEPAM 5 MG/1
5 TABLET ORAL
Status: ON HOLD | COMMUNITY
End: 2021-11-03

## 2021-10-24 RX ORDER — ACETAMINOPHEN 325 MG/1
650 TABLET ORAL EVERY 6 HOURS PRN
Status: DISCONTINUED | OUTPATIENT
Start: 2021-10-24 | End: 2021-10-24

## 2021-10-24 RX ORDER — ONDANSETRON 2 MG/ML
4 INJECTION INTRAMUSCULAR; INTRAVENOUS EVERY 4 HOURS PRN
Status: DISCONTINUED | OUTPATIENT
Start: 2021-10-24 | End: 2021-11-09 | Stop reason: HOSPADM

## 2021-10-24 RX ORDER — ONDANSETRON 4 MG/1
4 TABLET, ORALLY DISINTEGRATING ORAL EVERY 4 HOURS PRN
Status: DISCONTINUED | OUTPATIENT
Start: 2021-10-24 | End: 2021-11-09 | Stop reason: HOSPADM

## 2021-10-24 RX ORDER — HYDROMORPHONE HYDROCHLORIDE 1 MG/ML
0.5 INJECTION, SOLUTION INTRAMUSCULAR; INTRAVENOUS; SUBCUTANEOUS
Status: DISCONTINUED | OUTPATIENT
Start: 2021-10-24 | End: 2021-11-09 | Stop reason: HOSPADM

## 2021-10-24 RX ORDER — SODIUM CHLORIDE, SODIUM LACTATE, POTASSIUM CHLORIDE, CALCIUM CHLORIDE 600; 310; 30; 20 MG/100ML; MG/100ML; MG/100ML; MG/100ML
INJECTION, SOLUTION INTRAVENOUS CONTINUOUS
Status: ACTIVE | OUTPATIENT
Start: 2021-10-24 | End: 2021-10-24

## 2021-10-24 RX ORDER — OXYCODONE HYDROCHLORIDE 10 MG/1
10 TABLET ORAL
Status: DISCONTINUED | OUTPATIENT
Start: 2021-10-24 | End: 2021-11-09 | Stop reason: HOSPADM

## 2021-10-24 RX ORDER — GABAPENTIN 300 MG/1
300 CAPSULE ORAL 3 TIMES DAILY
Status: ON HOLD | COMMUNITY
End: 2021-11-03

## 2021-10-24 RX ORDER — IPRATROPIUM BROMIDE AND ALBUTEROL SULFATE 2.5; .5 MG/3ML; MG/3ML
3 SOLUTION RESPIRATORY (INHALATION)
Status: DISCONTINUED | OUTPATIENT
Start: 2021-10-24 | End: 2021-11-09 | Stop reason: HOSPADM

## 2021-10-24 RX ORDER — AMOXICILLIN 250 MG
2 CAPSULE ORAL 2 TIMES DAILY
Status: DISCONTINUED | OUTPATIENT
Start: 2021-10-24 | End: 2021-11-09 | Stop reason: HOSPADM

## 2021-10-24 RX ORDER — PAROXETINE HYDROCHLORIDE 20 MG/1
20 TABLET, FILM COATED ORAL DAILY
Status: DISCONTINUED | OUTPATIENT
Start: 2021-10-24 | End: 2021-10-24

## 2021-10-24 RX ORDER — ACETAMINOPHEN 325 MG/1
650 TABLET ORAL EVERY 6 HOURS PRN
Status: DISCONTINUED | OUTPATIENT
Start: 2021-10-24 | End: 2021-10-26

## 2021-10-24 RX ORDER — POLYETHYLENE GLYCOL 3350 17 G/17G
1 POWDER, FOR SOLUTION ORAL
Status: DISCONTINUED | OUTPATIENT
Start: 2021-10-24 | End: 2021-11-09 | Stop reason: HOSPADM

## 2021-10-24 RX ORDER — ONDANSETRON 2 MG/ML
4 INJECTION INTRAMUSCULAR; INTRAVENOUS EVERY 4 HOURS PRN
Status: DISCONTINUED | OUTPATIENT
Start: 2021-10-24 | End: 2021-10-24

## 2021-10-24 RX ORDER — LEVOTHYROXINE SODIUM 0.05 MG/1
50 TABLET ORAL DAILY
Status: DISCONTINUED | OUTPATIENT
Start: 2021-10-24 | End: 2021-11-09 | Stop reason: HOSPADM

## 2021-10-24 RX ORDER — PREDNISONE 5 MG/1
5 TABLET ORAL 2 TIMES DAILY
Status: ON HOLD | COMMUNITY
End: 2021-11-03

## 2021-10-24 RX ADMIN — CEFTRIAXONE SODIUM 2 G: 2 INJECTION, POWDER, FOR SOLUTION INTRAMUSCULAR; INTRAVENOUS at 10:41

## 2021-10-24 RX ADMIN — METOPROLOL TARTRATE 25 MG: 25 TABLET, FILM COATED ORAL at 17:08

## 2021-10-24 RX ADMIN — MAGNESIUM SULFATE HEPTAHYDRATE 2 G: 40 INJECTION, SOLUTION INTRAVENOUS at 15:04

## 2021-10-24 RX ADMIN — DOCUSATE SODIUM 50 MG AND SENNOSIDES 8.6 MG 2 TABLET: 8.6; 5 TABLET, FILM COATED ORAL at 17:09

## 2021-10-24 RX ADMIN — RIVAROXABAN 20 MG: 20 TABLET, FILM COATED ORAL at 17:08

## 2021-10-24 RX ADMIN — DIBASIC SODIUM PHOSPHATE, MONOBASIC POTASSIUM PHOSPHATE AND MONOBASIC SODIUM PHOSPHATE 250 MG: 852; 155; 130 TABLET ORAL at 13:08

## 2021-10-24 RX ADMIN — DIBASIC SODIUM PHOSPHATE, MONOBASIC POTASSIUM PHOSPHATE AND MONOBASIC SODIUM PHOSPHATE 250 MG: 852; 155; 130 TABLET ORAL at 17:08

## 2021-10-24 ASSESSMENT — COGNITIVE AND FUNCTIONAL STATUS - GENERAL
DRESSING REGULAR UPPER BODY CLOTHING: A LITTLE
WALKING IN HOSPITAL ROOM: A LOT
TURNING FROM BACK TO SIDE WHILE IN FLAT BAD: A LOT
PERSONAL GROOMING: A LITTLE
DRESSING REGULAR LOWER BODY CLOTHING: A LOT
EATING MEALS: A LITTLE
MOVING FROM LYING ON BACK TO SITTING ON SIDE OF FLAT BED: A LOT
MOBILITY SCORE: 11
CLIMB 3 TO 5 STEPS WITH RAILING: TOTAL
SUGGESTED CMS G CODE MODIFIER MOBILITY: CL
SUGGESTED CMS G CODE MODIFIER DAILY ACTIVITY: CK
MOVING TO AND FROM BED TO CHAIR: A LOT
STANDING UP FROM CHAIR USING ARMS: A LOT
DAILY ACTIVITIY SCORE: 15
TOILETING: A LOT
HELP NEEDED FOR BATHING: A LOT

## 2021-10-24 ASSESSMENT — GAIT ASSESSMENTS: GAIT LEVEL OF ASSIST: UNABLE TO PARTICIPATE

## 2021-10-24 ASSESSMENT — CHA2DS2 SCORE
CHF OR LEFT VENTRICULAR DYSFUNCTION: NO
PRIOR STROKE OR TIA OR THROMBOEMBOLISM: NO
AGE 65 TO 74: NO
CHA2DS2 VASC SCORE: 4
SEX: FEMALE
AGE 75 OR GREATER: YES
HYPERTENSION: YES
DIABETES: NO
VASCULAR DISEASE: NO

## 2021-10-24 ASSESSMENT — PAIN DESCRIPTION - PAIN TYPE
TYPE: ACUTE PAIN

## 2021-10-24 ASSESSMENT — ACTIVITIES OF DAILY LIVING (ADL): TOILETING: INDEPENDENT

## 2021-10-24 NOTE — ASSESSMENT & PLAN NOTE
Follows with radiation oncology Dr. Fairbanks at [facility] has been  discussed with Dr. Fairbanks for radiation oncology.     ---Patient was evaluated by medical oncology on 5/2021, stated and not a candidate for chemotherapy due to patient performance status.  After discussing extensively with radiation oncology sister that patient is a candidate for hospice.  I discussed the case with patient's daughter/sister, hospice referral has been placed  CM updated, she has been accepted to hospice    Also, her daughter/sister stated that they cant take care of her full time; They stated that she will need to go to group home for hospice   Case management reaching out to leadership

## 2021-10-24 NOTE — ASSESSMENT & PLAN NOTE
Rate well controlled on metoprolol tartrate 25 mg p.o. twice daily, continue Xarelto for now  To consider to stop xarelto later

## 2021-10-24 NOTE — ASSESSMENT & PLAN NOTE
Patient was transferred with a sling, will continue for support.  Pain control.  Right upper extremity neurovascularly intact, range of motion intact  No surgery per ortho   -- plan to dc on hospice

## 2021-10-24 NOTE — ASSESSMENT & PLAN NOTE
Resolved    This is Sepsis Present on admission  SIRS criteria identified on my evaluation include: Tachycardia, with heart rate greater than 90 BPM, Tachypnea, with respirations greater than 20 per minute and Leukocytosis, with WBC greater than 12,000  Source is Urinary  Sepsis protocol initiated  Fluid resuscitation ordered per protocol  IV antibiotics as appropriate for source of sepsis  While organ dysfunction may be noted elsewhere in this problem list or in the chart, degree of organ dysfunction does not meet CMS criteria for severe sepsis  She was started on ceftriaxone, she received fosphomycin x 1 as UA showed ESBL

## 2021-10-24 NOTE — PROGRESS NOTES
Hospital Medicine Daily Progress Note    Date of Service  10/24/2021    Chief Complaint  Idalia HALE is a 81 y.o. female admitted 10/24/2021 with sepsis, ground-level fall, right acromial distal fracture, UTI    Hospital Course  81-year-old female past medical history of COPD, emphysema, urinary incontinence, mitral valve prolapse, prior VT, hypertension, lung cancer, A. fib was transferred from [facility] for sepsis, hypokalemia.  Please refer to H&P of Dr. Hurtado for further details.  On arrival potassium was repleted.  Patient was started on ceftriaxone concern for urinary tract infection, sepsis.  Also noted distal third clavicle fracture with minimal displacement.  Orthopedic was consulted and no surgical intervention was deemed at this time.  Continue pain management.  She will need to follow-up with orthopedic in 2 to 4 weeks and repeat x-ray      Interval Problem Update  Patient sleeping, easy to arouse.vitals stable. Spoke to her sister, Jl. I did provide updates. Pt follows with Dr. Natarajan oncology, [facility] . She is not a candidate for any therapy because she is not strong. She has spinal infection, wound vac. Dr. Hicks follows. Lives on her own in [place]. Daughter and sister have been able to hire a caregiver for evening and morning against her wish. She still have no DPOA, and wants to take decisions about medical condition. She has expressed that she wants everything done to save life, but still not compliant and she is weak. Palliative consult in place     I have personally seen and examined the patient at bedside. I discussed the plan of care with patient, family, bedside RN,  and orthopedics.    Consultants/Specialty  orthopedics    Code Status  Full Code    Disposition  Patient is not medically cleared.   Anticipate discharge to to be determine .  I have placed the appropriate orders for post-discharge needs.    Review of Systems  Review of Systems   Unable to  perform ROS: Mental acuity        Physical Exam  Temp:  [36.4 °C (97.6 °F)-36.7 °C (98.1 °F)] 36.4 °C (97.6 °F)  Pulse:  [] 91  Resp:  [17-18] 17  BP: (113-118)/(57-74) 118/57  SpO2:  [90 %-94 %] 94 %    Physical Exam  Vitals and nursing note reviewed.   Constitutional:       General: She is not in acute distress.     Appearance: She is ill-appearing.      Comments: Frail, sleeping   HENT:      Head: Normocephalic and atraumatic.   Eyes:      General: No scleral icterus.  Cardiovascular:      Rate and Rhythm: Normal rate.      Heart sounds: Murmur heard.     Pulmonary:      Effort: Pulmonary effort is normal. No respiratory distress.      Breath sounds: No wheezing.   Abdominal:      General: There is no distension.      Palpations: Abdomen is soft.      Tenderness: There is no abdominal tenderness.   Musculoskeletal:      Comments: Wound vac on her back    Skin:     General: Skin is dry.      Coloration: Skin is not jaundiced.   Neurological:      Mental Status: She is disoriented.      Comments: Cannot assess, somnolent    Psychiatric:      Comments: Somnolent          Fluids  No intake or output data in the 24 hours ending 10/24/21 1244    Laboratory  Recent Labs     10/24/21  0715   WBC 11.9*   RBC 4.07*   HEMOGLOBIN 11.1*   HEMATOCRIT 36.9*   MCV 90.7   MCH 27.3   MCHC 30.1*   RDW 50.7*   PLATELETCT 353   MPV 9.0     Recent Labs     10/24/21  0715   SODIUM 139   POTASSIUM 3.7   CHLORIDE 106   CO2 21   GLUCOSE 117*   BUN 10   CREATININE 0.61   CALCIUM 8.7     Recent Labs     10/24/21  0715   INR 1.01               Imaging  DX-SHOULDER 2+ RIGHT   Final Result         Acute comminuted and mildly displaced fracture of the distal clavicle. No involvement of the AC joint.           Assessment/Plan  Infection of lumbar spine (HCC)- (present on admission)  Assessment & Plan  She has wound vac in place  Follows with Dr. Hicks  No other records available     Closed nondisplaced fracture of acromial end of right  clavicle- (present on admission)  Assessment & Plan  Patient was transferred with a sling, will continue for support.  Pain control.  PT/OT  Right upper extremity neurovascularly intact, range of motion intact  No surgery per ortho  Needs to follow up as OP     Encephalopathy- (present on admission)  Assessment & Plan  In setting of UTI and sepsis  -Treat UTI/sepsis, if her mentation does not fully clear should pursue further work-up.  -vitals and neuro checks q4h  - IVF  - attempt to minimize risk of delirium such as avoiding day time napping and promote night time sleep, monitor for constipation, remove lines/tubing not needed, avoid early lab draws and vital checks, limit polypharmacy as able, and keep close to the window      Chronic pain syndrome- (present on admission)  Assessment & Plan  Methadone morphine on patient's home med list, she is little encephalopathic with her UTI, will hold scheduled narcotics at this time.      Acquired hypothyroidism- (present on admission)  Assessment & Plan  Continue Synthroid 50 mcg daily    Paroxysmal atrial fibrillation (HCC)- (present on admission)  Assessment & Plan  History of, in sinus rhythm at outside facility, EKG ordered.  Continue metoprolol and Xarelto  10/24- resume metoprolol. Pt tachy     Sepsis (HCC)- (present on admission)  Assessment & Plan  This is Sepsis Present on admission  SIRS criteria identified on my evaluation include: Tachycardia, with heart rate greater than 90 BPM, Tachypnea, with respirations greater than 20 per minute and Leukocytosis, with WBC greater than 12,000  Source is Urinary  Sepsis protocol initiated  Fluid resuscitation ordered per protocol  IV antibiotics as appropriate for source of sepsis  While organ dysfunction may be noted elsewhere in this problem list or in the chart, degree of organ dysfunction does not meet CMS criteria for severe sepsis    Ceftriaxone ordered, follow up blood and urine culture.         COPD (chronic  obstructive pulmonary disease) (HCC)- (present on admission)  Assessment & Plan  Not in acute exacerbation.  DuoNebs as needed.      Non-small cell lung cancer (NSCLC) (HCC)- (present on admission)  Assessment & Plan  Follows with oncology  Stage 4 per sister  Not a candidate for treatment  Poor prognosis        VTE prophylaxis: therapeutic anticoagulation with xarelto     I have performed a physical exam and reviewed and updated ROS and Plan today (10/24/2021). In review of yesterday's note (10/23/2021), there are no changes except as documented above.

## 2021-10-24 NOTE — HOSPITAL COURSE
81-year-old female past medical history of COPD, emphysema, urinary incontinence, mitral valve prolapse, prior VT, hypertension, lung cancer, A. fib was transferred from [facility] for sepsis, hypokalemia.  Please refer to H&P of Dr. Hurtado for further details.  On arrival potassium was repleted.  Patient was started on ceftriaxone concern for urinary tract infection, sepsis.  Also noted distal third clavicle fracture with minimal displacement.  Orthopedic was consulted and no surgical intervention was deemed at this time.  Continue pain management.  She will need to follow-up with orthopedic in 2 to 4 weeks and repeat x-ray

## 2021-10-24 NOTE — THERAPY
"Occupational Therapy   Initial Evaluation     Patient Name: Idalia rFeitas  Age:  81 y.o., Sex:  female  Medical Record #: [id number]  Today's Date: 10/24/2021     Precautions: Fall Risk, Sling Right Upper Extremity (sling for comfort) wound Vac on her back  Comments: no WB restrictions in chart, R clavicular fracture, patient refusing to wear sling     Assessment  Patient is 81 y.o. female admitted 10/24/2021 with sepsis, ground-level fall, right acromial distal fracture, UTI.  Pt's clavicle fx is non -op with use of sling for comfort.  Pt currently refusing to wear sling.  Pt was confused, poor historian, unable to state why she has a wound vac in her back & was not receptive to new learning.  Pt became tearful & stated her mother & brothers recently ? Pt stated she lives alone with her pets?   Unclear how pt was functioning home alone?  Pt will need Post Acute OT services prior to D/C.    Plan    Recommend Occupational Therapy 3 times per week until therapy goals are met for the following treatments:  Adaptive Equipment, Cognitive Skill Development, Neuro Re-Education / Balance, Self Care/Activities of Daily Living, Therapeutic Activities and Therapeutic Exercises.    DC Equipment Recommendations: Unable to determine at this time  Discharge Recommendations: Recommend post-acute placement for additional occupational therapy services prior to discharge home     Subjective    \"I need to lay back down my back is killing me\"     Objective       10/24/21 1137   Prior Living Situation   Prior Services Unable To Determine At This Time   Housing / Facility [address] [facility]   Lives with - Patient's Self Care Capacity Alone and Able to Care For Self   Comments pt is not a good historian, pt reports she was living alone with her cats & dogs?  unclear how pt was functioning PTA?  Pt does not appear to be able to care for herself?   Prior Level of IADL Function   Medication Management Unable To Determine At This Time " "  Cognition    Cognition / Consciousness X   Speech/ Communication Delayed Responses   Orientation Level Not Oriented to Address;Not Oriented to Age;Not Oriented to Month;Not Oriented to Time;Not Oriented to Place;Not Oriented to Reason   Level of Consciousness Alert   Ability To Follow Commands 1 Step   Safety Awareness Impaired;Impulsive   New Learning Impaired   Attention Impaired   Sequencing Impaired   Comments Pt is not an accurate historian, stated her mother recently ?   Active ROM Upper Body   Comments pt has amp of all 5 digits on her right hand.  Pt also with pain in her right shoulder but refused to wear sling or avoid WB through her RUE   Balance Assessment   Sitting Balance (Static) Poor +   Sitting Balance (Dynamic) Poor   Standing Balance (Static) Poor -   Standing Balance (Dynamic) Trace +   Weight Shift Sitting Poor   Weight Shift Standing Poor   Bed Mobility    Supine to Sit Moderate Assist   Sit to Supine Moderate Assist   Scooting Moderate Assist   Rolling Moderate Assist to Rt.   Comments pt positioned onto her side to avoid pressure on her wound vac & tubing   ADL Assessment   Eating Minimal Assist   Grooming Moderate Assist;Seated   Bathing Moderate Assist   Upper Body Dressing Moderate Assist   Lower Body Dressing Maximal Assist   Toileting Maximal Assist   Functional Mobility   Sit to Stand Moderate Assist   Bed, Chair, Wheelchair Transfer Refused   Toilet Transfers Refused   Comments pt repeatedly asking to lay back down   Edema / Skin Assessment   Comments Pt has wound vac on her back that appears red.  At risk for further pressure wound from vac tubing   Patient / Family Goals   Patient / Family Goal #1 \"I need to lay down\"   Short Term Goals   Short Term Goal # 1 Pt will be Supervised for ADL transfers   Short Term Goal # 2 Pt will sit up in chair for 2 meals/day   Short Term Goal # 3 Pt will groom seated with supervision                 "

## 2021-10-24 NOTE — ASSESSMENT & PLAN NOTE
Hx of    She has wound vac in place. 11/4- discuss with sister. Wound vac very uncomfortable to pt. Remove wound vac. Pt going for hospice, no reversible condition   Follows with Dr. Canales

## 2021-10-24 NOTE — PROGRESS NOTES
4 Eyes Skin Assessment Completed by MARCUS Jolly and MARCUS Reid.    Head WDL  Ears WDL  Nose WDL  Mouth WDL  Neck WDL  Breast/Chest WDL  Shoulder Blades Redness  Spine Redness  (R) Arm/Elbow/Hand Scab  (L) Arm/Elbow/Hand Scab  Abdomen WDL  Groin WDL  Scrotum/Coccyx/Buttocks Redness, Non-Blanching and Discoloration  (R) Leg WDL  (L) Leg WDL  (R) Heel/Foot/Toe Redness, Non-Blanching, Discoloration and Bruising  (L) Heel/Foot/Toe WDL          Devices In Places: wound vac, PIV      Interventions In Place Pillows, Q2 Turns and Pressure Redistribution Mattress    Possible Skin Injury Yes    Pictures Uploaded Into Epic No, needs to be completed  Wound Consult Placed Yes  RN Wound Prevention Protocol Ordered No

## 2021-10-24 NOTE — THERAPY
"Physical Therapy   Initial Evaluation     Patient Name: Idalia Freitas  Age:  81 y.o., Sex:  female  Medical Record #: [id number]  Today's Date: 10/24/2021     Precautions  Precautions: Fall Risk;Sling Right Upper Extremity  Comments: no WB restrictions in chart, R clavicular fracture, patient refusing to wear sling     Assessment   81-year-old female past medical history of COPD, emphysema, urinary incontinence, mitral valve prolapse, prior VT, hypertension, lung cancer, A. fib was transferred from [facility] for sepsis, hypokalemia.  Please refer to H&P of Dr. Hurtado for further details.  On arrival potassium was repleted.  Patient was started on ceftriaxone concern for urinary tract infection, sepsis.  Also noted distal third clavicle fracture with minimal displacement.  Orthopedic was consulted and no surgical intervention was deemed at this time, sling for comfort. Patient presents with confusion, impaired posture, strength, mobility and balance. She is a poor historian but it does not appear she was able to be indep PTA despite her report. Will continue to follow and assess, but she will need placement for further therapy.     Plan    Recommend Physical Therapy 3 times per week until therapy goals are met for the following treatments:  Bed Mobility, Equipment, Gait Training, Neuro Re-Education / Balance, Stair Training, Therapeutic Activities and Therapeutic Exercises    DC Equipment Recommendations: Unable to determine at this time  Discharge Recommendations: Recommend post-acute placement for additional physical therapy services prior to discharge home       Subjective    \"Leave me alone, I don't want to get up\"     Objective       10/24/21 1115   Precautions   Precautions Fall Risk;Sling Right Upper Extremity   Comments no WB restrictions in chart, R clavicular fracture, patient refusing to wear sling    Pain 0 - 10 Group   Location Arm;Shoulder;Back   Therapist Pain Assessment During " Activity;Nurse Notified  (does not rate the pain )   Prior Living Situation   Prior Services Home-Independent   Housing / Facility [address]   Lives with - Patient's Self Care Capacity Alone and Able to Care For Self   Comments Patient is a poor historian and is unable to give deatils of home set-up. She reports she lives with her dog and cat and that she has 6 children but only sees them at [place] and then start to cry    Prior Level of Functional Mobility   Bed Mobility Independent   Transfer Status Independent   Ambulation Independent   Comments Unknown PLOF, from wound on back, posture and observed balance would assume she uses a FWW   History of Falls   History of Falls Yes   Date of Last Fall   (admitted s/p fall )   Cognition    Cognition / Consciousness X   Speech/ Communication Delayed Responses   Orientation Level Not Oriented to Place;Not Oriented to Reason;Not Oriented to Time;Not Oriented to Day   Ability To Follow Commands 1 Step   Safety Awareness Impaired;Impulsive   New Learning Impaired   Attention Impaired   Sequencing Impaired   Comments patient confused and minimally participatory 2/2 pain.  Requires repetition    Passive ROM Lower Body   Passive ROM Lower Body WDL   Active ROM Lower Body    Active ROM Lower Body  WDL   Strength Lower Body   Lower Body Strength  X   Gross Strength Generalized Weakness, Equal Bilaterally   Sensation Lower Body   Lower Extremity Sensation   Not Tested   Strength Upper Body   Upper Body Strength  X   Comments R UE limited by pain. Patient with R hand amputation. Unable to state what from. Reports she also cannot use her L UE   Coordination Lower Body    Coordination Lower Body  X   Balance Assessment   Sitting Balance (Static) Poor +   Sitting Balance (Dynamic) Poor   Standing Balance (Static) Poor -   Standing Balance (Dynamic) Trace +   Weight Shift Sitting Poor   Weight Shift Standing Poor   Comments w/HHA   Gait Analysis   Gait Level Of Assist Unable to  Participate   Bed Mobility    Supine to Sit Moderate Assist   Sit to Supine Moderate Assist   Scooting Moderate Assist   Rolling Moderate Assist to Rt.;Moderate Assist to Lt.   Functional Mobility   Sit to Stand Moderate Assist   Comments STS with posterior COG and pt requesting to lay back down    How much difficulty does the patient currently have...   Turning over in bed (including adjusting bedclothes, sheets and blankets)? 2   Sitting down on and standing up from a chair with arms (e.g., wheelchair, bedside commode, etc.) 2   Moving from lying on back to sitting on the side of the bed? 2   How much help from another person does the patient currently need...   Moving to and from a bed to a chair (including a wheelchair)? 2   Need to walk in a hospital room? 2   Climbing 3-5 steps with a railing? 1   6 clicks Mobility Score 11   Activity Tolerance   Sitting in Chair NT 2/2 confusion    Sitting Edge of Bed 3 min    Standing 1 min    Comments limites by pain, confusion and request to lay down    Edema / Skin Assessment   Edema / Skin  X   Comments at risk for skin breakdwon. wound vac in place thoracic spine with evidence of skin breakdown from wound vac tubing, RN aware    Short Term Goals    Short Term Goal # 1 in 6 visits patient will demo all functional transfers Becca for safe DC    Short Term Goal # 2 in 6 visits patient will demo all bed mobility indep for safe DC    Short Term Goal # 3 in 6 visits patient will ambualte 100' Becca for safe DC    Education Group   Education Provided Role of Physical Therapist;Brace Wear and Care   Role of Physical Therapist Patient Response Patient;Refuses;Explanation;Demonstration;Teaching Refused   Brace Wear & Care Patient Response Patient;Refuses;Explanation;Demonstration;Teaching Refused   Problem List    Problems Pain;Impaired Bed Mobility;Impaired Transfers;Impaired Ambulation;Impaired Balance;Impaired Coordination;Decreased Activity Tolerance;Safety Awareness Deficits /  Cognition;Limited Knowledge of Post-Op Precautions   Anticipated Discharge Equipment and Recommendations   DC Equipment Recommendations Unable to determine at this time   Discharge Recommendations Recommend post-acute placement for additional physical therapy services prior to discharge home       Elda Barroso, PT, DPT, GCS

## 2021-10-24 NOTE — H&P
Hospital Medicine History & Physical Note    Date of Service  10/24/2021    Primary Care Physician  Joshua Garcia D.O.    Consultants  None    Code Status  Full Code    Chief Complaint  No chief complaint on file.      History of Presenting Illness  Idalia HALE is an 81-year-old female past medical history of COPD/emphysema, urinary incontinence, mitral valve prolapse, prior VTE, hypertension, lung cancer, paroxysmal A. fib presented to outside facility for ground-level fall on the morning of presentation with complaints of right-sided shoulder pain that started after she fell described as moderate and achy aggravated by movement no alleviating factors.  Patient was found to have a distal clavicle fracture for which she was placed in a sling, additionally she was found to have hypokalemia 2.6 she was given 40 M EQ's p.o. and 10 mEq of IV potassium, she is found to be septic secondary to a urinary tract infection.  She was treated with ceftriaxone.  Blood and urine cultures were taken.  She has remained in sinus rhythm and there have been no episodes of hypotension or atrial fibrillation or other arrhythmias, she is on room air maintaining saturations greater than 90%.  Transferring facility is at capacity and we do not have any telemetry beds, patient accepted for bed on [facility] and patient arrives tachycardic to 115, /74 normal respirations, afebrile, pulse ox 91% on room air.  She is alert and conversant although she is little confused with some of her details and recognizes this and is frustrated by it.  Her right shoulder is little sore otherwise she has no acute complaints.  She does have a wound VAC in place on her mid back from an old nonhealing wound that she is having difficulty remembering the details for.      I discussed the plan of care with patient, bedside RN, pharmacy.    Review of Systems  Review of Systems   Unable to perform ROS: Mental acuity       Past Medical History   has a past  medical history of Breath shortness, Cancer (CMS-HCC), EMPHYSEMA (2008), Heart valve disease (1985), Hypertension, Non-small cell lung cancer (NSCLC) (CMS-HCC) (7/11/2017), Pain, Personal history of venous thrombosis and embolism, Rheumatoid arthritis (CMS-HCC) (2004), Unspecified disorder of thyroid (1995), and Unspecified urinary incontinence (2009).    Surgical History   has a past surgical history that includes wide excision (9/18/08); flap closure (9/18/08); biopsy breast (1990); mass excision general (2008); foot surgery (1990); cholecystectomy (2000); hysterectomy radical (1974); and wide excision (8/7/2009).     Family History  family history is not known by patient right now due to encephalopathy.       Social History   reports that she has quit smoking. Her smoking use included cigarettes. She has never used smokeless tobacco. She reports that she does not drink alcohol and does not use drugs.    Allergies  Allergies   Allergen Reactions   • Tetracycline Anaphylaxis and Hives       Medications  Prior to Admission Medications   Prescriptions Last Dose Informant Patient Reported? Taking?   FOLIC ACID 400 MCG tablet   Yes No   Sig: Take  by mouth 3 times a day.   LEVOTHYROXINE 50 MCG TABS   Yes No   Sig: Take  by mouth every day.   METHADONE 10 MG TABS   Yes No   Sig: Take  by mouth 3 times a day as needed for Mild Pain.   METHOTREXATE 2.5 MG tablet   Yes No   Sig: Take 10 Tabs by mouth every 7 days.   MORPHINE SR 30 MG TB12   Yes No   Sig: Take  by mouth 3 times a day.   MULTIVITAMINS PO   Yes No   Sig: Take  by mouth every day.   PAXIL 20 MG TABS   Yes No   Sig: Take  by mouth every day.   PERCOCET  MG TABS   Yes No   Sig: Take  by mouth 4 times a day.   TOPROL XL 50 MG TB24   Yes No   Sig: Take  by mouth 2 Times a Day.   levofloxacin (LEVAQUIN) 250 MG TABS   No No   Sig: Take 3 Tabs by mouth every day.   metoprolol (LOPRESSOR) 25 MG Tab   No No   Sig: Take 1 Tab by mouth 2 times a day.   rivaroxaban  (XARELTO) 20 MG Tab tablet   Yes No   Sig: Take 20 mg by mouth with dinner.      Facility-Administered Medications: None       Physical Exam  Temp:  [36.6 °C (97.8 °F)] 36.6 °C (97.8 °F)  Pulse:  [116] 116  Resp:  [17] 17  BP: (113)/(74) 113/74  SpO2:  [91 %] 91 %  Blood Pressure : 113/74   Temperature: 36.6 °C (97.8 °F)   Pulse: (!) 116   Respiration: 17   Pulse Oximetry: 91 %       Physical Exam  Vitals and nursing note reviewed. Exam conducted with a chaperone present.   Constitutional:       General: She is not in acute distress.     Appearance: She is not ill-appearing.      Comments: Frail-appearing 81-year-old female appears stated age, appears chronically ill, alert and conversant, little confused on details   HENT:      Head: Normocephalic and atraumatic.      Nose: Nose normal. No rhinorrhea.      Mouth/Throat:      Mouth: Mucous membranes are dry.      Pharynx: Oropharynx is clear.   Eyes:      Extraocular Movements: Extraocular movements intact.      Conjunctiva/sclera: Conjunctivae normal.      Pupils: Pupils are equal, round, and reactive to light.   Cardiovascular:      Rate and Rhythm: Regular rhythm. Tachycardia present.      Pulses: Normal pulses.   Pulmonary:      Effort: Pulmonary effort is normal. No respiratory distress.      Breath sounds: No wheezing or rhonchi.      Comments: Poor inspiratory effort  Abdominal:      General: Bowel sounds are normal.      Palpations: Abdomen is soft.      Tenderness: There is no abdominal tenderness. There is no guarding or rebound.   Musculoskeletal:         General: Deformity (right hand digits amputated) present.      Cervical back: Normal range of motion and neck supple.      Right lower leg: No edema.      Left lower leg: No edema.   Skin:     General: Skin is warm and dry.      Capillary Refill: Capillary refill takes less than 2 seconds.      Comments: Wound vac in place on mid-back   Neurological:      General: No focal deficit present.      Mental  Status: She is alert. She is disoriented.      Cranial Nerves: No cranial nerve deficit.      Sensory: No sensory deficit.      Motor: No weakness.      Comments: Face symmetrical, tongue midline, able to move all extremities, motor and sensation intact   Psychiatric:         Mood and Affect: Mood normal.         Behavior: Behavior normal.         Thought Content: Thought content normal.         Judgment: Judgment normal.         Laboratory:  Recent Labs     10/24/21  0715   WBC 11.9*   RBC 4.07*   HEMOGLOBIN 11.1*   HEMATOCRIT 36.9*   MCV 90.7   MCH 27.3   MCHC 30.1*   RDW 50.7*   PLATELETCT 353   MPV 9.0         No results for input(s): ALTSGPT, ASTSGOT, ALKPHOSPHAT, TBILIRUBIN, DBILIRUBIN, GAMMAGT, AMYLASE, LIPASE, ALB, PREALBUMIN, GLUCOSE in the last 72 hours.      No results for input(s): NTPROBNP in the last 72 hours.      No results for input(s): TROPONINT in the last 72 hours.    Imaging:  No orders to display       X-Ray:  X-ray from outside facility shows emphysematous changes with persistent right lower lobe nodularity as well as a right distal clavicular fracture and chronic right-sided rib fractures  EKG:  I have personally reviewed the images and compared with prior images. and My impression is: Sinus tachycardia, no ST elevations, left atrial enlargement.    Assessment/Plan:  I anticipate this patient will require at least two midnights for appropriate medical management, necessitating inpatient admission.    Sepsis (HCC)- (present on admission)  Assessment & Plan  This is Sepsis Present on admission  SIRS criteria identified on my evaluation include: Tachycardia, with heart rate greater than 90 BPM, Tachypnea, with respirations greater than 20 per minute and Leukocytosis, with WBC greater than 12,000  Source is Urinary  Sepsis protocol initiated  Fluid resuscitation ordered per protocol  IV antibiotics as appropriate for source of sepsis  While organ dysfunction may be noted elsewhere in this problem  list or in the chart, degree of organ dysfunction does not meet CMS criteria for severe sepsis    Ceftriaxone ordered, follow up blood and urine culture.         Closed nondisplaced fracture of acromial end of right clavicle- (present on admission)  Assessment & Plan  Patient was transferred with a sling, will continue for support.  Pain control.  PT/OT  Right upper extremity neurovascularly intact, range of motion intact    Encephalopathy- (present on admission)  Assessment & Plan  In setting of UTI and sepsis  -Treat UTI/sepsis, if her mentation does not fully clear should pursue further work-up.  -vitals and neuro checks q4h  - IVF  - attempt to minimize risk of delirium such as avoiding day time napping and promote night time sleep, monitor for constipation, remove lines/tubing not needed, avoid early lab draws and vital checks, limit polypharmacy as able, and keep close to the window      Chronic pain syndrome- (present on admission)  Assessment & Plan  Methadone morphine on patient's home med list, she is little encephalopathic with her UTI, will hold scheduled narcotics at this time.      Acquired hypothyroidism- (present on admission)  Assessment & Plan  Continue Synthroid 50 mcg daily    Paroxysmal atrial fibrillation (HCC)- (present on admission)  Assessment & Plan  History of, in sinus rhythm at outside facility, EKG ordered.  Continue metoprolol and Xarelto    COPD (chronic obstructive pulmonary disease) (HCC)- (present on admission)  Assessment & Plan  Not in acute exacerbation.  DuoNebs as needed.        VTE prophylaxis: SCDs/TEDs and therapeutic anticoagulation with Xarelto

## 2021-10-24 NOTE — PROGRESS NOTES
Pt is a direct admit from [facility]. Pt on unit @ 0610. Alert/oriented to self, reported RUE pain. Would consult placed for non-blanching area on right heel and non-blanching area on the sacrum/coccyx. Personal wound vac in place on mid-back. Call light within reach, personal belongings available, bed in lowest position, treaded socks on, and bed alarm on. Hourly rounding in place.

## 2021-10-24 NOTE — CONSULTS
Asked for recommendations regarding right shoulder.     Xrays show distal third clavicle fracture with minimal displacement    82yo Female with numerous comorbidities, with right clavicle fracture    Sling for comfort  Pain control  Follow up in office in 2 weeks for repeat xrays  Does not need inpatient admission for right clavicle

## 2021-10-24 NOTE — ASSESSMENT & PLAN NOTE
In setting of UTI and sepsis verss  --Patient mentation has not been cleared, MRI of the brain  Without contrast that was obtained at [facility]': no obvious mets  11/4- this is he new baseline. Pt going for hospice, poor prognosis

## 2021-10-24 NOTE — PROGRESS NOTES
[facility] TRIAGE OFFICER DIRECT ADMISSION REPORT  Transferring facility: [facility]  Transferring physician: Dr Gonzalez  Chief complaint: Ground-level fall  Pertinent history & patient course: 81-year-old female past medical history of COPD/emphysema, urinary incontinence, mitral valve prolapse, prior VTE, hypertension, lung cancer presented to outside facility for ground-level fall on the morning of presentation with complaints of right-sided shoulder pain.  Patient was found to have a distal clavicle fracture for which she was placed in a sling, additionally she was found to have hypokalemia 2.6 she was given 40 M EQ's p.o. and 10 mEq of IV potassium, she is found to be septic secondary to a urinary tract infection.  She was treated with ceftriaxone.  Blood and urine cultures were taken.  She has remained in sinus rhythm and there have been no episodes of hypotension or atrial fibrillation or other arrhythmias, she is on room air maintaining saturations greater than 90%.  Transferring facility is at capacity and we do not have any telemetry beds, patient accepted for bed on [facility].  Code Status: Full per transferring provider  Consultants called prior to transfer and pertinent input from consultants: NA  Patient accepted for transfer: Yes  Consultants to be called upon arrival: None  Admission status: Inpatient.   Floor requested: Med/Surg  If ICU transfer, name of intensivist case discussed with and pertinent input from critical care: NA    Please inform the triage officer upon arrival of the patient to [facility] for assignment of a hospitalist to perform admission.     For any question or concerns regarding the care of this patient, please reach out to the assigned hospitalist.

## 2021-10-25 LAB
ANION GAP SERPL CALC-SCNC: 13 MMOL/L (ref 7–16)
BASOPHILS # BLD AUTO: 1.3 % (ref 0–1.8)
BASOPHILS # BLD: 0.14 K/UL (ref 0–0.12)
BUN SERPL-MCNC: 8 MG/DL (ref 8–22)
CALCIUM SERPL-MCNC: 9.1 MG/DL (ref 8.5–10.5)
CHLORIDE SERPL-SCNC: 106 MMOL/L (ref 96–112)
CO2 SERPL-SCNC: 20 MMOL/L (ref 20–33)
CREAT SERPL-MCNC: 0.46 MG/DL (ref 0.5–1.4)
EKG IMPRESSION: NORMAL
EOSINOPHIL # BLD AUTO: 0.4 K/UL (ref 0–0.51)
EOSINOPHIL NFR BLD: 3.8 % (ref 0–6.9)
ERYTHROCYTE [DISTWIDTH] IN BLOOD BY AUTOMATED COUNT: 51.1 FL (ref 35.9–50)
GLUCOSE SERPL-MCNC: 102 MG/DL (ref 65–99)
HCT VFR BLD AUTO: 42.1 % (ref 37–47)
HGB BLD-MCNC: 13.4 G/DL (ref 12–16)
IMM GRANULOCYTES # BLD AUTO: 0.08 K/UL (ref 0–0.11)
IMM GRANULOCYTES NFR BLD AUTO: 0.8 % (ref 0–0.9)
LYMPHOCYTES # BLD AUTO: 1.2 K/UL (ref 1–4.8)
LYMPHOCYTES NFR BLD: 11.4 % (ref 22–41)
MAGNESIUM SERPL-MCNC: 2.1 MG/DL (ref 1.5–2.5)
MCH RBC QN AUTO: 28.3 PG (ref 27–33)
MCHC RBC AUTO-ENTMCNC: 31.8 G/DL (ref 33.6–35)
MCV RBC AUTO: 89 FL (ref 81.4–97.8)
MONOCYTES # BLD AUTO: 1.56 K/UL (ref 0–0.85)
MONOCYTES NFR BLD AUTO: 14.8 % (ref 0–13.4)
NEUTROPHILS # BLD AUTO: 7.14 K/UL (ref 2–7.15)
NEUTROPHILS NFR BLD: 67.9 % (ref 44–72)
NRBC # BLD AUTO: 0 K/UL
NRBC BLD-RTO: 0 /100 WBC
PHOSPHATE SERPL-MCNC: 2.3 MG/DL (ref 2.5–4.5)
PLATELET # BLD AUTO: 354 K/UL (ref 164–446)
PMV BLD AUTO: 9.5 FL (ref 9–12.9)
POTASSIUM SERPL-SCNC: 3.3 MMOL/L (ref 3.6–5.5)
RBC # BLD AUTO: 4.73 M/UL (ref 4.2–5.4)
SODIUM SERPL-SCNC: 139 MMOL/L (ref 135–145)
WBC # BLD AUTO: 10.5 K/UL (ref 4.8–10.8)

## 2021-10-25 PROCEDURE — 99233 SBSQ HOSP IP/OBS HIGH 50: CPT | Mod: 25 | Performed by: INTERNAL MEDICINE

## 2021-10-25 PROCEDURE — A9270 NON-COVERED ITEM OR SERVICE: HCPCS | Performed by: STUDENT IN AN ORGANIZED HEALTH CARE EDUCATION/TRAINING PROGRAM

## 2021-10-25 PROCEDURE — 83735 ASSAY OF MAGNESIUM: CPT

## 2021-10-25 PROCEDURE — A9270 NON-COVERED ITEM OR SERVICE: HCPCS | Performed by: INTERNAL MEDICINE

## 2021-10-25 PROCEDURE — 36415 COLL VENOUS BLD VENIPUNCTURE: CPT

## 2021-10-25 PROCEDURE — 770001 HCHG ROOM/CARE - MED/SURG/GYN PRIV*

## 2021-10-25 PROCEDURE — 80048 BASIC METABOLIC PNL TOTAL CA: CPT

## 2021-10-25 PROCEDURE — 93010 ELECTROCARDIOGRAM REPORT: CPT | Performed by: INTERNAL MEDICINE

## 2021-10-25 PROCEDURE — 700111 HCHG RX REV CODE 636 W/ 250 OVERRIDE (IP): Performed by: STUDENT IN AN ORGANIZED HEALTH CARE EDUCATION/TRAINING PROGRAM

## 2021-10-25 PROCEDURE — 97605 NEG PRS WND THER DME<=50SQCM: CPT

## 2021-10-25 PROCEDURE — 97530 THERAPEUTIC ACTIVITIES: CPT

## 2021-10-25 PROCEDURE — 84100 ASSAY OF PHOSPHORUS: CPT

## 2021-10-25 PROCEDURE — 700102 HCHG RX REV CODE 250 W/ 637 OVERRIDE(OP): Performed by: STUDENT IN AN ORGANIZED HEALTH CARE EDUCATION/TRAINING PROGRAM

## 2021-10-25 PROCEDURE — 700105 HCHG RX REV CODE 258: Performed by: STUDENT IN AN ORGANIZED HEALTH CARE EDUCATION/TRAINING PROGRAM

## 2021-10-25 PROCEDURE — 700102 HCHG RX REV CODE 250 W/ 637 OVERRIDE(OP): Performed by: INTERNAL MEDICINE

## 2021-10-25 PROCEDURE — 85025 COMPLETE CBC W/AUTO DIFF WBC: CPT

## 2021-10-25 PROCEDURE — 99497 ADVNCD CARE PLAN 30 MIN: CPT | Performed by: INTERNAL MEDICINE

## 2021-10-25 RX ORDER — POTASSIUM CHLORIDE 20 MEQ/1
40 TABLET, EXTENDED RELEASE ORAL ONCE
Status: COMPLETED | OUTPATIENT
Start: 2021-10-25 | End: 2021-10-25

## 2021-10-25 RX ADMIN — DOCUSATE SODIUM 50 MG AND SENNOSIDES 8.6 MG 2 TABLET: 8.6; 5 TABLET, FILM COATED ORAL at 16:34

## 2021-10-25 RX ADMIN — CEFTRIAXONE SODIUM 2 G: 2 INJECTION, POWDER, FOR SOLUTION INTRAMUSCULAR; INTRAVENOUS at 06:22

## 2021-10-25 RX ADMIN — ACETAMINOPHEN 650 MG: 325 TABLET ORAL at 22:27

## 2021-10-25 RX ADMIN — METOPROLOL TARTRATE 25 MG: 25 TABLET, FILM COATED ORAL at 16:34

## 2021-10-25 RX ADMIN — RIVAROXABAN 20 MG: 20 TABLET, FILM COATED ORAL at 16:34

## 2021-10-25 RX ADMIN — POTASSIUM CHLORIDE 40 MEQ: 1500 TABLET, EXTENDED RELEASE ORAL at 09:11

## 2021-10-25 RX ADMIN — LEVOTHYROXINE SODIUM 50 MCG: 0.05 TABLET ORAL at 06:29

## 2021-10-25 RX ADMIN — OXYCODONE HYDROCHLORIDE 10 MG: 10 TABLET ORAL at 09:11

## 2021-10-25 ASSESSMENT — ENCOUNTER SYMPTOMS
SHORTNESS OF BREATH: 0
BACK PAIN: 1
DIARRHEA: 0
VOMITING: 0
SORE THROAT: 0
HEADACHES: 0
CHILLS: 0
NERVOUS/ANXIOUS: 0
COUGH: 0
INSOMNIA: 0
ABDOMINAL PAIN: 0
DIZZINESS: 0
FEVER: 0

## 2021-10-25 ASSESSMENT — COGNITIVE AND FUNCTIONAL STATUS - GENERAL
TURNING FROM BACK TO SIDE WHILE IN FLAT BAD: UNABLE
CLIMB 3 TO 5 STEPS WITH RAILING: TOTAL
WALKING IN HOSPITAL ROOM: A LOT
MOVING FROM LYING ON BACK TO SITTING ON SIDE OF FLAT BED: UNABLE
SUGGESTED CMS G CODE MODIFIER MOBILITY: CM
MOVING TO AND FROM BED TO CHAIR: UNABLE
MOBILITY SCORE: 8
STANDING UP FROM CHAIR USING ARMS: A LOT

## 2021-10-25 ASSESSMENT — PAIN DESCRIPTION - PAIN TYPE
TYPE: ACUTE PAIN;SURGICAL PAIN
TYPE: ACUTE PAIN;SURGICAL PAIN

## 2021-10-25 ASSESSMENT — GAIT ASSESSMENTS
ASSISTIVE DEVICE: FRONT WHEEL WALKER
DISTANCE (FEET): 1
GAIT LEVEL OF ASSIST: MODERATE ASSIST

## 2021-10-25 NOTE — PROGRESS NOTES
Med Rec complete per Pt's sister over the phone.  Allergies reviewed.  Pt's sister states Pt finished a 10 day course of Cephalexin 500mg BID approx. 10/23/21 that was being taken for UTI.

## 2021-10-25 NOTE — DISCHARGE PLANNING
Agency/Facility Name: [facility]  Spoke To: Kym   Outcome: Per kym they can resume services for pt if needed.

## 2021-10-25 NOTE — WOUND TEAM
[facility]  Inpatient Services  Initial Wound and Skin Care Evaluation    Admission Date: 10/24/2021     Last order of IP CONSULT TO WOUND CARE was found on 10/24/2021 from Hospital Encounter on 10/24/2021     HPI, PMH, SH: Reviewed    Past Surgical History:   Procedure Laterality Date   • WIDE EXCISION  8/7/2009    Performed by AMADO HALL at SURGERY [facility] ORS   • WIDE EXCISION  9/18/08    Performed by GERRY CASTILLO at SURGERY [facility] ORS   • FLAP CLOSURE  9/18/08    Performed by GERRY CASTILLO at SURGERY [facility] ORS   • MASS EXCISION GENERAL  2008    left upper arm  (cancerous )   • CHOLECYSTECTOMY  2000    laparoscopically    • BIOPSY BREAST  1990    right breast biopsy  (benign)   • FOOT SURGERY  1990    mass removed from right foot   • HYSTERECTOMY RADICAL  1974    abdominal      Social History     Tobacco Use   • Smoking status: Former Smoker     Types: Cigarettes   • Smokeless tobacco: Never Used   • Tobacco comment: Quit in 1989   Substance Use Topics   • Alcohol use: No     No chief complaint on file.    Diagnosis: Sepsis (HCC) [A41.9]    Unit where seen by Wound Team: T312/02     WOUND CONSULT/FOLLOW UP RELATED TO:  Sacrum, right heel and mid-back NPWT     WOUND HISTORY:  Patient was unable to provide history on back.  Patient from outside facility    WOUND ASSESSMENT/LDA  Wound 10/24/21 Pressure Injury Coccyx;Sacrum Mid (Active)   Wound Image   10/25/21 1100   Site Assessment Purple;Red 10/25/21 1100   Periwound Assessment Clean;Dry;Intact;Blanchable erythema;Non-blanchable erythema 10/25/21 1100   Closure Open to air 10/25/21 1100   Drainage Amount None 10/25/21 1100   Treatments Offloading 10/25/21 1100   Wound Cleansing Not Applicable 10/25/21 1100   Periwound Protectant Barrier Paste 10/24/21 1945   Dressing Options Mepilex 10/25/21 1100   Dressing Changed New 10/25/21 1100   Dressing Status Clean;Dry;Intact 10/25/21 1100   Dressing Change/Treatment Frequency  Every 72 hrs, and As Needed 10/25/21 1100   NEXT Dressing Change/Treatment Date 10/28/21 10/25/21 1100   NEXT Weekly Photo (Inpatient Only) 11/01/21 10/25/21 1100   Pressure Injury Stage DTPI 10/25/21 1100   Wound Length (cm) 5 cm 10/25/21 1100   Wound Width (cm) 5 cm 10/25/21 1100   Wound Surface Area (cm^2) 25 cm^2 10/25/21 1100   Shape irregular 10/25/21 1100   Number of days: 1       Wound 10/25/21 Full Thickness Wound Back Mid (Active)   Wound Image   10/25/21 1100   Site Assessment Red;Hyperkeratosis;Healing ridge 10/25/21 1100   Periwound Assessment Clean;Dry;Intact;Scar tissue 10/25/21 1100   Margins Defined edges;Unattached edges 10/25/21 1100   Closure Secondary intention 10/25/21 1100   Drainage Amount Small 10/25/21 1100   Drainage Description Serosanguineous 10/25/21 1100   Treatments Irrigation;Cleansed;Site care;Offloading 10/25/21 1100   Wound Cleansing Not Applicable 10/25/21 1100   Periwound Protectant Skin Protectant Wipes to Periwound;Paste Ring;Drape 10/25/21 1100   Dressing Cleansing/Solutions Not Applicable 10/25/21 1100   Dressing Options Wound Vac 10/25/21 1100   Dressing Changed Changed 10/25/21 1100   Dressing Status Clean;Dry;Intact 10/25/21 1100   Dressing Change/Treatment Frequency Monday, Wednesday, Friday, and As Needed 10/25/21 1100   NEXT Dressing Change/Treatment Date 10/27/21 10/25/21 1100   NEXT Weekly Photo (Inpatient Only) 11/01/21 10/25/21 1100   Non-staged Wound Description Full thickness 10/25/21 1100   Wound Length (cm) 7 cm 10/25/21 1100   Wound Width (cm) 2.4 cm 10/25/21 1100   Wound Depth (cm) 0.6 cm 10/25/21 1100   Wound Surface Area (cm^2) 16.8 cm^2 10/25/21 1100   Wound Volume (cm^3) 10.08 cm^3 10/25/21 1100   Shape Oval 10/25/21 1100   Wound Odor Mild 10/25/21 1100   Exposed Structures None 10/25/21 1100   WOUND NURSE ONLY - Time Spent with Patient (mins) 45 10/25/21 1100   Number of days: 0        Vascular:    GOPAL:   No results found.    Lab Values:    Lab Results    Component Value Date/Time    WBC 10.5 10/25/2021 04:32 AM    RBC 4.73 10/25/2021 04:32 AM    HEMOGLOBIN 13.4 10/25/2021 04:32 AM    HEMATOCRIT 42.1 10/25/2021 04:32 AM        Culture Results show:  No results found for this or any previous visit (from the past 720 hour(s)).    Pain Level/Medicated:  Pre-medicated       INTERVENTIONS BY WOUND TEAM:  Chart and images reviewed. Discussed with bedside RN. All areas of concern (based on picture review, LDA review and discussion with bedside RN) have been thoroughly assessed. Documentation of areas based on significant findings. This RN in to assess patient. Performed standard wound care which includes appropriate positioning, dressing removal and non-selective debridement. Pictures and measurements obtained weekly if/when required.  Preparation for Dressing removal: Dressing soaked with NS  Non-selectively Debrided with:  NS and gauze.  Sharp debridement: NA  Michaela wound: Cleansed with NS and gauze, Prepped with no sting skin prep and paste ring  Primary Dressin black foam to wound bed and sealed with drape.  Secondary (Outer) Dressing: mepilex    Interdisciplinary consultation: Patient, Bedside RN (Karma)    EVALUATION / RATIONALE FOR TREATMENT:  Most Recent Date:  10/25/21: mid-back wound has hypergranulation, silver nitrate applied to hypergranulation to help possibly flatten down the growth.  Possibly need provider debridement to remove all hypergranulation.  NPWT continued to help with drainage.  Sacrum is a POA DTI, offloaded with sacral mepilex. Right heel is callus and dry skin, no wounds.      Goals: Steady decrease in wound area and depth weekly.    WOUND TEAM PLAN OF CARE ([X] for frequency of wound follow up,):   Nursing to follow orders written for wound care. Contact wound team if area fails to progress, deteriorates or with any questions/concerns  Dressing changes by wound team:                   Follow up 3 times weekly:                NPWT change 3  times weekly:  X,  Mid-back  Follow up 1-2 times weekly:      Follow up Bi-Monthly:                   Follow up as needed:   sacrum  Other (explain):     NURSING PLAN OF CARE ORDERS (X):  Dressing changes: See Dressing Care orders: x  Skin care: See Skin Care orders:   RN Prevention Protocol: x  Rectal tube care: See Rectal Tube Care orders:   Other orders:    RSKIN:   CURRENTLY IN PLACE (X), APPLIED THIS VISIT (A), ORDERED (O):   Q shift Tobias:  X  Q shift pressure point assessments:  X    Surface/Positioning   Pressure redistribution mattress   x       Low Airloss          Bariatric foam      Bariatric REGINO     Waffle cushion        Waffle Overlay      O    Reposition q 2 hours    x  TAPs Turning system     Z Rylan Pillow     Offloading/Redistribution   Sacral Mepilex (Silicone dressing)   a  Heel Mepilex (Silicone dressing)      a   Heel float boots (Prevalon boot)             Float Heels off Bed with Pillows           Respiratory NA  Silicone O2 tubing         Gray Foam Ear protectors     Cannula fixation Device (Tender )          High flow offloading Clip    Elastic head band offloading device      Anchorfast                                                         Trach with Optifoam split foam             Containment/Moisture Prevention     Rectal tube or BMS    Purwick/Condom Cath    x    Moreira Catheter    Barrier wipes           Barrier paste       Antifungal tx      Interdry        Mobilization       Up to chair        Ambulate      PT/OT  x    Nutrition       Dietician        Diabetes Education      PO x    TF     TPN     NPO   # days     Other        Anticipated discharge plans:   LTACH:        SNF/Rehab:  x                Home Health Care:           Outpatient Wound Center:            Self/Family Care:        Other:                  Vac Discharge Needs:   Not Applicable Pt not on a wound vac:       Regular Vac while inpatient, alternative dressing at DC:        Regular Vac in use and continued at DC:           x  Reg. Vac w/ Skin Sub/Biologic in use. Will need to be changed 2x wkly:      Veraflo Vac while inpatient, ok to transition to Regular Vac on Discharge:           Veraflo Vac while inpatient, will need to remain on Veraflo Vac upon discharge:

## 2021-10-25 NOTE — CARE PLAN
The patient is Watcher - Medium risk of patient condition declining or worsening    Shift Goals  Clinical Goals: Pain control  Patient Goals: pain control    Progress made toward(s) clinical / shift goals:    Problem: Knowledge Deficit - Standard  Goal: Patient and family/care givers will demonstrate understanding of plan of care, disease process/condition, diagnostic tests and medications  Outcome: Progressing  Note: Patient able to communicate needs effectively. Plan of care updated to patient and on whiteboard. All questions answered at this time.      Problem: Fall Risk  Goal: Patient will remain free from falls  Outcome: Progressing  Note: Educated on fall risk and ensured fall precautions in place. Bed in lowest position, treaded socks in place, call light in reach, bed alarm in place, room free of clutter.        Patient is not progressing towards the following goals:

## 2021-10-25 NOTE — CARE PLAN
The patient is Stable - Low risk of patient condition declining or worsening    Shift Goals  Clinical Goals: reorient patient; pain control  Patient Goals: pain control    Progress made toward(s) clinical / shift goals:  Pt is confused but able to be oriented.  She understands where she is and her condition.  Pt denies pain.    Patient is not progressing towards the following goals:

## 2021-10-25 NOTE — FACE TO FACE
Face to Face Supporting Documentation - Home Health    The encounter with this patient was in whole or in part the primary reason for home health admission.    Date of encounter:   Patient:                    MRN:                       YOB: 2022  Idalia Freitas  [id number]  1940     Home health to see patient for:  Skilled Nursing care for assessment, interventions & education, Wound Care, Physical Therapy evaluation and treatment and Occupational therapy evaluation and treatment    Skilled need for:  Exacerbation of Chronic Disease State debility, lung cancer, spinal lumbar infection, wound vac in place, Recent Deterioration of Health Status lung cancer, age, spinal infection , Medication Management debility, lung cancer, spinal lumbar infection, wound vac in plac and Comment: debility, lung cancer, spinal lumbar infection, wound vac in plac, PT/OT    Skilled nursing interventions to include:  Wound Care and Comment: PT/OT    Homebound status evidenced by:  Need the aid of supportive devices such as crutches, canes, wheelchairs or walkers, Require the use of special transportation or Needs the assistance of another person in order to leave the home. Leaving home requires a considerable and taxing effort. There is a normal inability to leave the home.    Community Physician to provide follow up care: Joshua Garcia D.O.     Optional Interventions? No      I certify the face to face encounter for this home health care referral meets the CMS requirements and the encounter/clinical assessment with the patient was, in whole, or in part, for the medical condition(s) listed above, which is the primary reason for home health care. Based on my clinical findings: the service(s) are medically necessary, support the need for home health care, and the homebound criteria are met.  I certify that this patient has had a face to face encounter by myself.  Alex Puente M.D. - NPI: [id number]

## 2021-10-25 NOTE — THERAPY
Physical Therapy   Daily Treatment     Patient Name: Idalia Freitas  Age:  81 y.o., Sex:  female  Medical Record #: [id number]  Today's Date: 10/25/2021     Precautions  Precautions: Fall Risk;Sling Right Upper Extremity (sling for comfort)  Comments: R clavicular fracture, no WBing restrictions per chart     Assessment    Pt mobilized as detailed below limited overall by her cognition/command following, generalized weakness, and impaired balance with standing/gait. Pt requiring modA for mobility at this time and would benefit from post acute rehab placement. Will continue to follow.     Plan    Continue current treatment plan.    DC Equipment Recommendations: Unable to determine at this time  Discharge Recommendations: Recommend post-acute placement for additional physical therapy services prior to discharge home      Subjective    Pt greeted in bed and agreeable to PT.     Objective       10/25/21 1145   Total Time Spent   Total Time Spent (Mins) 24   Charge Group   Charges  Yes   PT Therapeutic Activities 2   Precautions   Precautions Fall Risk;Sling Right Upper Extremity  (sling for comfort)   Comments R clavicular fracture, no WBing restrictions per chart    Vitals   O2 (LPM) 0   O2 Delivery Device None - Room Air   Pain 0 - 10 Group   Therapist Pain Assessment Post Activity Pain Same as Prior to Activity;0   Cognition    Cognition / Consciousness X   Speech/ Communication Delayed Responses   Orientation Level Not Oriented to Address;Not Oriented to Age;Not Oriented to Month;Not Oriented to Time;Not Oriented to Place;Not Oriented to Reason   Level of Consciousness Alert   Ability To Follow Commands 1 Step   Safety Awareness Impaired;Impulsive   New Learning Impaired   Attention Impaired   Sequencing Impaired   Comments pt with confusion and tangential, requiring repetition    Balance   Sitting Balance (Static) Fair -   Sitting Balance (Dynamic) Poor +   Standing Balance (Static) Poor   Standing Balance  (Dynamic) Poor -   Weight Shift Sitting Poor   Weight Shift Standing Poor   Skilled Intervention Verbal Cuing   Comments w/ FWW   Gait Analysis   Gait Level Of Assist Moderate Assist   Assistive Device Front Wheel Walker   Distance (Feet) 1   # of Times Distance was Traveled 1   Deviation Step To;Decreased Base Of Support;Shuffled Gait;Bradykinetic;Decreased Heel Strike   # of Stairs Climbed 0   Weight Bearing Status FWB   Skilled Intervention Verbal Cuing   Comments side steps EOB only due to pts fatigue and fear   Bed Mobility    Supine to Sit Moderate Assist   Sit to Supine Moderate Assist   Scooting Moderate Assist   Rolling Moderate Assist to Lt.   Skilled Intervention Verbal Cuing   Comments w/ bed features   Functional Mobility   Sit to Stand Moderate Assist   Bed, Chair, Wheelchair Transfer Unable to Participate   Toilet Transfers Unable to Participate   Mobility bed mobility > standing with FWW and side steps > bed mobility   Skilled Intervention Verbal Cuing   Comments unsafe to attempt transfer to chair due to pts limited balance and safety with side steps    How much difficulty does the patient currently have...   Turning over in bed (including adjusting bedclothes, sheets and blankets)? 1   Sitting down on and standing up from a chair with arms (e.g., wheelchair, bedside commode, etc.) 1   Moving from lying on back to sitting on the side of the bed? 1   How much help from another person does the patient currently need...   Moving to and from a bed to a chair (including a wheelchair)? 2   Need to walk in a hospital room? 2   Climbing 3-5 steps with a railing? 1   6 clicks Mobility Score 8   Activity Tolerance   Sitting in Chair NT, unsafe   Sitting Edge of Bed 10mins   Standing 2mins   Comments limited by fear/cognition as well as weakness   Patient / Family Goals    Patient / Family Goal #1 None stated.    Short Term Goals    Short Term Goal # 1 in 6 visits patient will demo all functional transfers  Becca for safe DC    Goal Outcome # 1 goal not met   Short Term Goal # 2 in 6 visits patient will demo all bed mobility indep for safe DC    Goal Outcome # 2 Goal not met   Short Term Goal # 3 in 6 visits patient will ambualte 100' Becca for safe DC    Goal Outcome # 3 Goal not met   Education Group   Education Provided Role of Physical Therapist   Role of Physical Therapist Patient Response Patient;Acceptance;Demonstration;Explanation;Verbal Demonstration;Action Demonstration;Reinforcement Needed   Anticipated Discharge Equipment and Recommendations   DC Equipment Recommendations Unable to determine at this time   Discharge Recommendations Recommend post-acute placement for additional physical therapy services prior to discharge home   Interdisciplinary Plan of Care Collaboration   IDT Collaboration with  Nursing   Patient Position at End of Therapy In Bed;Bed Alarm On;Call Light within Reach;Tray Table within Reach;Phone within Reach   Collaboration Comments report given   Session Information   Date / Session Number  10/25 -2(2/3, 10/30)

## 2021-10-25 NOTE — PROGRESS NOTES
Patient was of sound mind and voluntarily participated in the conversation.  RN were present for the conversation.  I discussed advance care planning face to face with the patient for at least 17 minutes, including diagnosis, prognosis, plan of care, risks and benefits of any therapies that could be offered, as well as alternatives including palliation and hospice, as appropriate. I did call her sister too to discuss above information.pt still would like to remain full code and continue treatment that can be optional for her. She does not want placement, absolutely. She has caregiver already and home health.   Forms were completed and placed in the chart.  A hospice consult was placed.  Alex Puente M.D.

## 2021-10-26 LAB
ANION GAP SERPL CALC-SCNC: 8 MMOL/L (ref 7–16)
BACTERIA UR CULT: ABNORMAL
BACTERIA UR CULT: ABNORMAL
BASOPHILS # BLD AUTO: 1.5 % (ref 0–1.8)
BASOPHILS # BLD: 0.11 K/UL (ref 0–0.12)
BUN SERPL-MCNC: 8 MG/DL (ref 8–22)
CALCIUM SERPL-MCNC: 9.3 MG/DL (ref 8.5–10.5)
CHLORIDE SERPL-SCNC: 107 MMOL/L (ref 96–112)
CO2 SERPL-SCNC: 24 MMOL/L (ref 20–33)
CREAT SERPL-MCNC: 0.39 MG/DL (ref 0.5–1.4)
EOSINOPHIL # BLD AUTO: 0.41 K/UL (ref 0–0.51)
EOSINOPHIL NFR BLD: 5.6 % (ref 0–6.9)
ERYTHROCYTE [DISTWIDTH] IN BLOOD BY AUTOMATED COUNT: 50.4 FL (ref 35.9–50)
GLUCOSE SERPL-MCNC: 100 MG/DL (ref 65–99)
HCT VFR BLD AUTO: 37.3 % (ref 37–47)
HGB BLD-MCNC: 11.5 G/DL (ref 12–16)
IMM GRANULOCYTES # BLD AUTO: 0.06 K/UL (ref 0–0.11)
IMM GRANULOCYTES NFR BLD AUTO: 0.8 % (ref 0–0.9)
LYMPHOCYTES # BLD AUTO: 1.51 K/UL (ref 1–4.8)
LYMPHOCYTES NFR BLD: 20.7 % (ref 22–41)
MCH RBC QN AUTO: 27.5 PG (ref 27–33)
MCHC RBC AUTO-ENTMCNC: 30.8 G/DL (ref 33.6–35)
MCV RBC AUTO: 89.2 FL (ref 81.4–97.8)
MONOCYTES # BLD AUTO: 1.19 K/UL (ref 0–0.85)
MONOCYTES NFR BLD AUTO: 16.3 % (ref 0–13.4)
NEUTROPHILS # BLD AUTO: 4.03 K/UL (ref 2–7.15)
NEUTROPHILS NFR BLD: 55.1 % (ref 44–72)
NRBC # BLD AUTO: 0 K/UL
NRBC BLD-RTO: 0 /100 WBC
PLATELET # BLD AUTO: 362 K/UL (ref 164–446)
PMV BLD AUTO: 9.6 FL (ref 9–12.9)
POTASSIUM SERPL-SCNC: 3.8 MMOL/L (ref 3.6–5.5)
PROCALCITONIN SERPL-MCNC: <0.05 NG/ML
RBC # BLD AUTO: 4.18 M/UL (ref 4.2–5.4)
SIGNIFICANT IND 70042: ABNORMAL
SITE SITE: ABNORMAL
SODIUM SERPL-SCNC: 139 MMOL/L (ref 135–145)
SOURCE SOURCE: ABNORMAL
WBC # BLD AUTO: 7.3 K/UL (ref 4.8–10.8)

## 2021-10-26 PROCEDURE — 770001 HCHG ROOM/CARE - MED/SURG/GYN PRIV*

## 2021-10-26 PROCEDURE — 84145 PROCALCITONIN (PCT): CPT

## 2021-10-26 PROCEDURE — 700111 HCHG RX REV CODE 636 W/ 250 OVERRIDE (IP): Performed by: STUDENT IN AN ORGANIZED HEALTH CARE EDUCATION/TRAINING PROGRAM

## 2021-10-26 PROCEDURE — 80048 BASIC METABOLIC PNL TOTAL CA: CPT

## 2021-10-26 PROCEDURE — 87040 BLOOD CULTURE FOR BACTERIA: CPT

## 2021-10-26 PROCEDURE — 700105 HCHG RX REV CODE 258: Performed by: STUDENT IN AN ORGANIZED HEALTH CARE EDUCATION/TRAINING PROGRAM

## 2021-10-26 PROCEDURE — 700102 HCHG RX REV CODE 250 W/ 637 OVERRIDE(OP): Performed by: INTERNAL MEDICINE

## 2021-10-26 PROCEDURE — 36415 COLL VENOUS BLD VENIPUNCTURE: CPT

## 2021-10-26 PROCEDURE — 85025 COMPLETE CBC W/AUTO DIFF WBC: CPT

## 2021-10-26 PROCEDURE — A9270 NON-COVERED ITEM OR SERVICE: HCPCS | Performed by: INTERNAL MEDICINE

## 2021-10-26 PROCEDURE — 700102 HCHG RX REV CODE 250 W/ 637 OVERRIDE(OP): Performed by: STUDENT IN AN ORGANIZED HEALTH CARE EDUCATION/TRAINING PROGRAM

## 2021-10-26 PROCEDURE — 99233 SBSQ HOSP IP/OBS HIGH 50: CPT | Performed by: HOSPITALIST

## 2021-10-26 PROCEDURE — A9270 NON-COVERED ITEM OR SERVICE: HCPCS | Performed by: STUDENT IN AN ORGANIZED HEALTH CARE EDUCATION/TRAINING PROGRAM

## 2021-10-26 RX ORDER — GRANULES FOR ORAL 3 G/1
3 POWDER ORAL ONCE
Status: DISCONTINUED | OUTPATIENT
Start: 2021-10-26 | End: 2021-10-27

## 2021-10-26 RX ORDER — ACETAMINOPHEN 325 MG/1
650 TABLET ORAL EVERY 6 HOURS PRN
Status: DISCONTINUED | OUTPATIENT
Start: 2021-10-26 | End: 2021-11-09 | Stop reason: HOSPADM

## 2021-10-26 RX ADMIN — METOPROLOL TARTRATE 25 MG: 25 TABLET, FILM COATED ORAL at 05:19

## 2021-10-26 RX ADMIN — DOCUSATE SODIUM 50 MG AND SENNOSIDES 8.6 MG 2 TABLET: 8.6; 5 TABLET, FILM COATED ORAL at 05:19

## 2021-10-26 RX ADMIN — CEFTRIAXONE SODIUM 2 G: 2 INJECTION, POWDER, FOR SOLUTION INTRAMUSCULAR; INTRAVENOUS at 05:19

## 2021-10-26 RX ADMIN — LEVOTHYROXINE SODIUM 50 MCG: 0.05 TABLET ORAL at 05:19

## 2021-10-26 ASSESSMENT — ENCOUNTER SYMPTOMS
ABDOMINAL PAIN: 0
DIZZINESS: 0
VOMITING: 0
BACK PAIN: 1
COUGH: 0
SORE THROAT: 0
FEVER: 0
CHILLS: 0
HEADACHES: 0
DOUBLE VISION: 0
SHORTNESS OF BREATH: 0
DIARRHEA: 0
NERVOUS/ANXIOUS: 0
INSOMNIA: 0

## 2021-10-26 ASSESSMENT — PAIN DESCRIPTION - PAIN TYPE
TYPE: ACUTE PAIN;SURGICAL PAIN
TYPE: ACUTE PAIN

## 2021-10-26 NOTE — PROGRESS NOTES
Hospital Medicine Daily Progress Note    Date of Service  10/26/2021    Chief Complaint  Idalia HALE is a 81 y.o. female admitted 10/24/2021 with sepsis, ground-level fall, right acromial distal fracture, UTI    Hospital Course  This is a 81 -year-old female with a past medical significant for COPD not on home O2 , urinary incontinence, diabetes mellitus, prior VT, hypertension  A. fib, secondary hypercoagulable state, not on anticoagulation, lung cancer diagnosed in July 2021, chronic back wound with wound VAC in place, followed by Dr. Canales and wound care team was transferred from [facility] on 10/24/2021.    Patient reports having a ground-level fall, leading to distal clavicle fracture hypokalemia resume 2.6, septic with elevated WBC 11.9, tachycardic with heart rate of 116.    Unfortunately blood cultures were not obtained during the admission secondary to urinary tract infection.  Urine culture was obtained, patient was started on ceftriaxone.  Regarding her distal clavicle fracture, orthopedic surgery  Dr Whalen was consulted, recommended sling for comfort, pain control, recommended following up with repeat x-ray in 2 weeks.    Patient noted that patient was encephalopathic thought to be secondary to urinary tract infection.  Upon my evaluation today, she is noted to be alert and oriented 2-3; not able to answer complex questions.  PT/OT has evaluated the patient and recommend postacute.  Patient needs maximum assist at this time.  She is not safe to go home.    I called patient sister, she does for Dr. Chen of radiation oncology at [place], she has an MRI of the brain obtained 10/20/2021, requesting records.  Patient sister stated that he has been hospitalized at [place] and received 4 to 6 weeks of IV antibiotic for her back wound.  Wound care has been consulted during the stay in the hospital.  Blood culture has been ordered    Interval Problem Update  10/26:  No  acute events overnight, patient is alert and oriented x2-3.  Vital signs stable, currently saturating well on room air.  Blood culture has been ordered, urine culture growing greater than 100,000 ESBL E. coli, will provide fosfomycin  --Noted to be anemic with hemoglobin of 11.5, will transfuse if less than 7  PT/OT evaluated, recommend postacute.  Patient does not wanted to go to skilled nursing facility as he is max assist    Patient is alert and oriented x1-2, her mentation fluctuating, cognitive evaluation ordered.  Patient does not have POA for medical.    Consultants/Specialty  orthopedics    Code Status  Full Code    Disposition  Patient is not medically cleared.   Anticipate discharge to to be determine .  I have placed the appropriate orders for post-discharge needs.    Review of Systems  Review of Systems   Constitutional: Positive for malaise/fatigue. Negative for chills and fever.   HENT: Negative for sore throat.    Eyes: Negative for double vision.   Respiratory: Negative for cough and shortness of breath.    Cardiovascular: Negative for chest pain and leg swelling.   Gastrointestinal: Negative for abdominal pain, diarrhea and vomiting.   Genitourinary: Negative for dysuria and urgency.   Musculoskeletal: Positive for back pain and joint pain.   Neurological: Negative for dizziness and headaches.   Psychiatric/Behavioral: The patient is not nervous/anxious and does not have insomnia.         Physical Exam  Temp:  [36.7 °C (98 °F)-37.4 °C (99.4 °F)] 36.7 °C (98 °F)  Pulse:  [] 86  Resp:  [16-19] 18  BP: (100-117)/(54-68) 100/54  SpO2:  [92 %-97 %] 92 %    Physical Exam  Vitals and nursing note reviewed.   Constitutional:       General: She is not in acute distress.     Appearance: She is ill-appearing.      Comments: Frail,    HENT:      Head: Normocephalic and atraumatic.   Eyes:      Extraocular Movements: Extraocular movements intact.   Cardiovascular:      Rate and Rhythm: Normal rate.       Heart sounds: Murmur heard.     Pulmonary:      Effort: Pulmonary effort is normal. No respiratory distress.      Breath sounds: No wheezing.   Abdominal:      General: There is no distension.      Palpations: Abdomen is soft.      Tenderness: There is no abdominal tenderness.   Musculoskeletal:      Comments: Wound vac on her back    Skin:     General: Skin is dry.      Coloration: Skin is not jaundiced.   Neurological:      Motor: No weakness.      Comments: Knows her name, knows the name of the president.  Does follow commands intermittently   Psychiatric:         Mood and Affect: Mood normal.         Fluids    Intake/Output Summary (Last 24 hours) at 10/26/2021 1609  Last data filed at 10/26/2021 0930  Gross per 24 hour   Intake 240 ml   Output --   Net 240 ml       Laboratory  Recent Labs     10/24/21  0715 10/25/21  0432 10/26/21  0349   WBC 11.9* 10.5 7.3   RBC 4.07* 4.73 4.18*   HEMOGLOBIN 11.1* 13.4 11.5*   HEMATOCRIT 36.9* 42.1 37.3   MCV 90.7 89.0 89.2   MCH 27.3 28.3 27.5   MCHC 30.1* 31.8* 30.8*   RDW 50.7* 51.1* 50.4*   PLATELETCT 353 354 362   MPV 9.0 9.5 9.6     Recent Labs     10/24/21  0715 10/25/21  0432 10/26/21  0349   SODIUM 139 139 139   POTASSIUM 3.7 3.3* 3.8   CHLORIDE 106 106 107   CO2 21 20 24   GLUCOSE 117* 102* 100*   BUN 10 8 8   CREATININE 0.61 0.46* 0.39*   CALCIUM 8.7 9.1 9.3     Recent Labs     10/24/21  0715   INR 1.01               Imaging  DX-SHOULDER 2+ RIGHT   Final Result         Acute comminuted and mildly displaced fracture of the distal clavicle. No involvement of the AC joint.           Assessment/Plan  Infection of lumbar spine (HCC)- (present on admission)  Assessment & Plan  She has wound vac in place  Follows with Dr. Hicks  No other records available     Closed nondisplaced fracture of acromial end of right clavicle- (present on admission)  Assessment & Plan  Patient was transferred with a sling, will continue for support.  Pain control.  PT/OT  Right upper extremity  neurovascularly intact, range of motion intact  No surgery per ortho  Needs to follow up as OP     Encephalopathy- (present on admission)  Assessment & Plan  In setting of UTI and sepsis verss  --Patient mentation has not been cleared, MRI of the brain that was obtained at [facility] pending  Obtain records from outlying facility    Chronic pain syndrome- (present on admission)  Assessment & Plan  Methadone morphine on patient's home med list, she is little encephalopathic with her UTI, will hold scheduled narcotics at this time.      Acquired hypothyroidism- (present on admission)  Assessment & Plan  Continue Synthroid 50 mcg daily    Paroxysmal atrial fibrillation (HCC)- (present on admission)  Assessment & Plan  Rate well controlled on metoprolol tartrate 25 mg p.o. twice daily, continue Xarelto for secondary hypercoagulable state    Sepsis (HCC)- (present on admission)  Assessment & Plan  This is Sepsis Present on admission  SIRS criteria identified on my evaluation include: Tachycardia, with heart rate greater than 90 BPM, Tachypnea, with respirations greater than 20 per minute and Leukocytosis, with WBC greater than 12,000  Source is Urinary  Sepsis protocol initiated  Fluid resuscitation ordered per protocol  IV antibiotics as appropriate for source of sepsis  While organ dysfunction may be noted elsewhere in this problem list or in the chart, degree of organ dysfunction does not meet CMS criteria for severe sepsis  She was started on ceftriaxone, will continue Bactrim as per culture and sensitivity results      COPD (chronic obstructive pulmonary disease) ([facility])- (present on admission)  Assessment & Plan  Not in acute exacerbation.  DuoNebs as needed.      Non-small cell lung cancer (NSCLC) ([facility])- (present on admission)  Assessment & Plan  Follows with radiation oncology Dr. Fairbanks at [facility] stage 4 per sister  Not a candidate for treatment  Poor prognosis     Pending records       VTE prophylaxis:  therapeutic anticoagulation with xarelto

## 2021-10-26 NOTE — RESPIRATORY CARE
COPD EDUCATION by COPD CLINICAL EDUCATOR  10/26/2021 at 12:47 PM by Mónica Hall, RRT     Patient interviewed by COPD education team. Patient refused COPD program at this time.     COPD Screen  COPD Risk Screening  Do you have a history of COPD?: Yes  Do you have a Pulmonologist?:  (Unknown)    COPD Assessment  COPD Clinical Specialists ONLY  COPD Education Initiated: Yes--Short Intervention (pt declined further comment, referrals and discussion, updated as needed below)  DME Company: none at this encounter  DME Equipment Type: none at this encounter  Physician Name: BLANCA PEREIRA D.O. last known  Pulmonologist Name: declined  Referrals Initiated: Yes  Smoking Cessation:  (quit in 1989)  Palliative Care:  (per MD note, Discussed)  Hospice:  (per MD Discussed,palliative and hospice)  Home Health Care: Yes (planned [facility] (prior))  [facility] Outreach: N/A (not avail)  [facility]: N/A  [facility]: N/A  Is this a COPD exacerbation patient?: No  (OP) Pulmonary Function Testing: Yes (last known 4/2012  FEV1 45%/ Ratio 35)    Meds to Beds  Would the patient like to opt in for Bedside Medication Delivery at Discharge?: Yes, interested  No respiratory medication at this time, no Action Plan completed

## 2021-10-26 NOTE — CARE PLAN
Problem: Nutritional:  Goal: Achieve adequate nutritional intake  Description: Patient will consume >50% of meals  10/26/2021 1010 by Edita Ro R.D.  Outcome: Not Met    See RD note.

## 2021-10-26 NOTE — CARE PLAN
Problem: Knowledge Deficit - Standard  Goal: Patient and family/care givers will demonstrate understanding of plan of care, disease process/condition, diagnostic tests and medications  Outcome: Not Progressing     Problem: Pain - Standard  Goal: Alleviation of pain or a reduction in pain to the patient’s comfort goal  Outcome: Not Progressing     Problem: Fall Risk  Goal: Patient will remain free from falls  Outcome: Progressing     Problem: Skin Integrity  Goal: Skin integrity is maintained or improved  Outcome: Progressing   The patient is Watcher - Medium risk of patient condition declining or worsening    Shift Goals  Clinical Goals: reorient patient; patient remain dry; safety  Patient Goals: rest    Progress made toward(s) clinical / shift goals:  Pt is being seen by wound, she has mepilex, pillows and is turned every 2 hours.  She has pure wick in place for incontinence.    Patient is not progressing towards the following goals: Pt needs to be reoriented, she is confused, and forgets she is in the hospital.  Pt removes oxygen because it tickles.  Pt is unable to voice feeling of pain unless mobilized by staff to turn.  Pt will hang her feet out of the bed and try to gather her belongings due to be confused of her situation and location.         Problem: Knowledge Deficit - Standard  Goal: Patient and family/care givers will demonstrate understanding of plan of care, disease process/condition, diagnostic tests and medications  Outcome: Not Progressing     Problem: Pain - Standard  Goal: Alleviation of pain or a reduction in pain to the patient’s comfort goal  Outcome: Not Progressing

## 2021-10-26 NOTE — DIETARY
"Late entry.    Nutrition services: Day 2 of admit.  Idalia Freitas is a 81 y.o. female with admitting DX of UTI  Consult received for low BMI    Met with pt at bedside. She was reporting her stomach hurt and pt seemed confused. She was unable to answer my questions at time of RD visit.    Assessment:  Weight: 46.6 kg (102 lb 11.8 oz)  Recent height not noted in chart. Last height per chart history was 5'2\" in 2017. Asked RN and CNA for updated height to confirm BMI.  Body mass index is 18.79 kg/m²., BMI classification: normal weight  Diet/Intake: Regular Diet    Evaluation:   1. Pt admitted for UTI, s/p GLF with fracture of clavicle, COPD, sepsis, a-fib, hypothyroidism, encephalopathy, infection of lumbar spine and non-small cell lung cancer  2. PO intake <25% x2 meals and 25-50% x1 meal per ADL's  3. Weight history per chart review: 146.3 lbs 7/11/2017. Unsure if pt has had recent weight loss.   4. Labs: glucose 100, phos 2.3, creatinine 0.39  5. Meds: Pericolace, Zofran, bowel regimen, phos (10/24)  6. Skin: PI coccyx- DTPI and full thickness wound mid back  7. GI: Last BM 10/26    Malnutrition Risk: Unable to assess at this time.     Recommendations/Plan:  1. Continue regular diet  2. Added Boost Plus TID  3. Encourage intake of all meals and supplements  4. Document intake of all meals  as % taken in ADL's to provide interdisciplinary communication across all shifts.   5. Monitor weight.  6. Nutrition rep will continue to see patient for ongoing meal and snack preferences.     RD following.             "

## 2021-10-26 NOTE — DISCHARGE PLANNING
Received Choice form at 1351  Agency/Facility Name: [facility]  Referral sent per Choice form @ 7508      LSW informed

## 2021-10-27 ENCOUNTER — APPOINTMENT (OUTPATIENT)
Dept: RADIOLOGY | Facility: MEDICAL CENTER | Age: 81
DRG: 872 | End: 2021-10-27
Attending: HOSPITALIST
Payer: MEDICARE

## 2021-10-27 PROBLEM — Z71.89 ADVANCE CARE PLANNING: Status: ACTIVE | Noted: 2021-10-27

## 2021-10-27 PROCEDURE — 99497 ADVNCD CARE PLAN 30 MIN: CPT | Performed by: HOSPITALIST

## 2021-10-27 PROCEDURE — 97602 WOUND(S) CARE NON-SELECTIVE: CPT

## 2021-10-27 PROCEDURE — 99233 SBSQ HOSP IP/OBS HIGH 50: CPT | Mod: 25 | Performed by: HOSPITALIST

## 2021-10-27 PROCEDURE — 700102 HCHG RX REV CODE 250 W/ 637 OVERRIDE(OP): Performed by: INTERNAL MEDICINE

## 2021-10-27 PROCEDURE — A9270 NON-COVERED ITEM OR SERVICE: HCPCS | Performed by: STUDENT IN AN ORGANIZED HEALTH CARE EDUCATION/TRAINING PROGRAM

## 2021-10-27 PROCEDURE — 70450 CT HEAD/BRAIN W/O DYE: CPT | Mod: ME

## 2021-10-27 PROCEDURE — 770001 HCHG ROOM/CARE - MED/SURG/GYN PRIV*

## 2021-10-27 PROCEDURE — 71045 X-RAY EXAM CHEST 1 VIEW: CPT

## 2021-10-27 PROCEDURE — 700102 HCHG RX REV CODE 250 W/ 637 OVERRIDE(OP): Performed by: STUDENT IN AN ORGANIZED HEALTH CARE EDUCATION/TRAINING PROGRAM

## 2021-10-27 PROCEDURE — A9270 NON-COVERED ITEM OR SERVICE: HCPCS | Performed by: INTERNAL MEDICINE

## 2021-10-27 PROCEDURE — 302098 PASTE RING (FLAT): Performed by: HOSPITALIST

## 2021-10-27 PROCEDURE — 700101 HCHG RX REV CODE 250: Performed by: HOSPITALIST

## 2021-10-27 RX ORDER — GRANULES FOR ORAL 3 G/1
3 POWDER ORAL ONCE
Status: COMPLETED | OUTPATIENT
Start: 2021-10-27 | End: 2021-10-27

## 2021-10-27 RX ADMIN — OXYCODONE 5 MG: 5 TABLET ORAL at 17:19

## 2021-10-27 RX ADMIN — METOPROLOL TARTRATE 25 MG: 25 TABLET, FILM COATED ORAL at 17:19

## 2021-10-27 RX ADMIN — LEVOTHYROXINE SODIUM 50 MCG: 0.05 TABLET ORAL at 04:58

## 2021-10-27 RX ADMIN — OXYCODONE 5 MG: 5 TABLET ORAL at 01:33

## 2021-10-27 RX ADMIN — RIVAROXABAN 20 MG: 20 TABLET, FILM COATED ORAL at 17:19

## 2021-10-27 RX ADMIN — GRANULES FOR ORAL SOLUTION 3 G: 3 POWDER ORAL at 12:13

## 2021-10-27 RX ADMIN — METOPROLOL TARTRATE 25 MG: 25 TABLET, FILM COATED ORAL at 04:58

## 2021-10-27 RX ADMIN — OXYCODONE 5 MG: 5 TABLET ORAL at 12:13

## 2021-10-27 ASSESSMENT — PAIN DESCRIPTION - PAIN TYPE
TYPE: ACUTE PAIN
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
## 2021-10-27 NOTE — WOUND TEAM
[facility]  Inpatient Services  Wound and Skin Care Progress Note    Admission Date: 10/24/2021     Last order of IP CONSULT TO WOUND CARE was found on 10/24/2021 from Hospital Encounter on 10/24/2021     HPI, PMH, SH: Reviewed    Past Surgical History:   Procedure Laterality Date   • WIDE EXCISION  8/7/2009    Performed by AMADO HALL at [facility]   • WIDE EXCISION  9/18/08    Performed by GERRY CASTILLO at [facility] ORS   • FLAP CLOSURE  9/18/08    Performed by GERRY CASTILLO at [facility] ORS   • MASS EXCISION GENERAL  2008    left upper arm  (cancerous )   • CHOLECYSTECTOMY  2000    laparoscopically    • BIOPSY BREAST  1990    right breast biopsy  (benign)   • FOOT SURGERY  1990    mass removed from right foot   • HYSTERECTOMY RADICAL  1974    abdominal      Social History     Tobacco Use   • Smoking status: Former Smoker     Types: Cigarettes   • Smokeless tobacco: Never Used   • Tobacco comment: Quit in 1989   Substance Use Topics   • Alcohol use: No     No chief complaint on file.    Diagnosis: Sepsis (HCC) [A41.9]    Unit where seen by Wound Team: T312/02     WOUND CONSULT/FOLLOW UP RELATED TO:  Sacrum, right heel and mid-back NPWT     WOUND HISTORY:  Patient was unable to provide history on back.  Patient from outside facility    WOUND ASSESSMENT/LDA  Negative Pressure Wound Therapy Back Mid (Active)   NPWT Pump Mode / Pressure Setting Ulta;Continuous;125 mmHg 10/27/21 1200   Dressing Type Small;Black Foam (Regular) 10/27/21 1200   Number of Foam Pieces Used 2 10/27/21 1200   Canister Changed No 10/27/21 1200   NEXT Dressing Change/Treatment Date 10/29/21 10/27/21 1200   WOUND NURSE ONLY - Time Spent with Patient (mins) 45 10/25/21 1100     Wound 10/25/21 Full Thickness Wound Back Mid (Active)   Wound Image   10/25/21 1100   Site Assessment Red;Hyperkeratosis;Healing ridge 10/27/21 1200   Periwound Assessment Scar tissue 10/27/21 1200   Margins Defined  edges;Unattached edges 10/27/21 1200   Closure IZABEL 10/26/21 0800   Drainage Amount Small 10/27/21 1200   Drainage Description Serosanguineous 10/27/21 1200   Treatments Cleansed;Site care 10/27/21 1200   Wound Cleansing Approved Wound Cleanser 10/27/21 1200   Periwound Protectant Skin Protectant Wipes to Periwound;Paste Ring;Drape 10/27/21 1200   Dressing Cleansing/Solutions Not Applicable 10/27/21 1200   Dressing Options Wound Vac;Mepilex 10/27/21 1200   Dressing Changed Changed 10/27/21 1200   Dressing Status Clean;Dry;Intact 10/27/21 1200   Dressing Change/Treatment Frequency Monday, Wednesday, Friday, and As Needed 10/27/21 1200   NEXT Dressing Change/Treatment Date 10/29/21 10/27/21 1200   NEXT Weekly Photo (Inpatient Only) 11/01/21 10/27/21 1200   Non-staged Wound Description Full thickness 10/27/21 1200   Wound Length (cm) 7 cm 10/25/21 1100   Wound Width (cm) 2.4 cm 10/25/21 1100   Wound Depth (cm) 0.6 cm 10/25/21 1100   Wound Surface Area (cm^2) 16.8 cm^2 10/25/21 1100   Wound Volume (cm^3) 10.08 cm^3 10/25/21 1100   Shape Oval  10/27/21 1200   Wound Odor Mild 10/27/21 1200   Exposed Structures None 10/27/21 1200   WOUND NURSE ONLY - Time Spent with Patient (mins) 45 10/27/21 1200     Vascular:    GOPAL:   No results found.    Lab Values:    Lab Results   Component Value Date/Time    WBC 7.3 10/26/2021 03:49 AM    RBC 4.18 (L) 10/26/2021 03:49 AM    HEMOGLOBIN 11.5 (L) 10/26/2021 03:49 AM    HEMATOCRIT 37.3 10/26/2021 03:49 AM        Culture Results show:  No results found for this or any previous visit (from the past 720 hour(s)).    Pain Level/Medicated:  Pre-medicated       INTERVENTIONS BY WOUND TEAM:  Chart and images reviewed. Discussed with bedside RN. All areas of concern (based on picture review, LDA review and discussion with bedside RN) have been thoroughly assessed. Documentation of areas based on significant findings. This RN in to assess patient. Performed standard wound care which includes  appropriate positioning, dressing removal and non-selective debridement. Pictures and measurements obtained weekly if/when required.  Preparation for Dressing removal: Dressing soaked with wound cleanser   Non-selectively Debrided with:  wound cleanser and gauze.  Sharp debridement: NA  Michaela wound: Cleansed with wound cleanser and gauze, Prepped with no sting skin prep and paste ring  Primary Dressin black foam to wound bed and sealed with drape.  Secondary (Outer) Dressing: mepilex    Interdisciplinary consultation: Patient, Bedside RN (Shelly), wound care tech Karen    EVALUATION / RATIONALE FOR TREATMENT:  Most Recent Date:  10/27/21: Mid back wound with persistent hypergranulation although a little less than last treatment. Patient was having significant pain, but denied it in her wound and mostly when moving her. Did not assess sacrum today. Dressing CDI.   10/25/21: mid-back wound has hypergranulation, silver nitrate applied to hypergranulation to help possibly flatten down the growth.  Possibly need provider debridement to remove all hypergranulation.  NPWT continued to help with drainage.  Sacrum is a POA DTI, offloaded with sacral mepilex. Right heel is callus and dry skin, no wounds.      Goals: Steady decrease in wound area and depth weekly.    WOUND TEAM PLAN OF CARE ([X] for frequency of wound follow up,):   Nursing to follow orders written for wound care. Contact wound team if area fails to progress, deteriorates or with any questions/concerns  Dressing changes by wound team:                   Follow up 3 times weekly:                NPWT change 3 times weekly:  X,  Mid-back  Follow up 1-2 times weekly:      Follow up Bi-Monthly:                   Follow up as needed:   sacrum  Other (explain):     NURSING PLAN OF CARE ORDERS (X):  Dressing changes: See Dressing Care orders: x  Skin care: See Skin Care orders:   RN Prevention Protocol: x  Rectal tube care: See Rectal Tube Care orders:   Other  orders:    RSKIN:   CURRENTLY IN PLACE (X), APPLIED THIS VISIT (A), ORDERED (O):   Q shift Tobias:  X  Q shift pressure point assessments:  X    Surface/Positioning   Pressure redistribution mattress   x       Low Airloss          Bariatric foam      Bariatric REGINO     Waffle cushion        Waffle Overlay      O    Reposition q 2 hours    x  TAPs Turning system     Z Rylan Pillow     Offloading/Redistribution   Sacral Mepilex (Silicone dressing)   a  Heel Mepilex (Silicone dressing)      a   Heel float boots (Prevalon boot)             Float Heels off Bed with Pillows           Respiratory NA  Silicone O2 tubing         Gray Foam Ear protectors     Cannula fixation Device (Tender )          High flow offloading Clip    Elastic head band offloading device      Anchorfast                                                         Trach with Optifoam split foam             Containment/Moisture Prevention     Rectal tube or BMS    Purwick/Condom Cath    x    Moreira Catheter    Barrier wipes           Barrier paste       Antifungal tx      Interdry        Mobilization       Up to chair        Ambulate      PT/OT  x    Nutrition       Dietician        Diabetes Education      PO x    TF     TPN     NPO   # days     Other        Anticipated discharge plans:   LTACH:        SNF/Rehab:  x                Home Health Care:           Outpatient Wound Center:            Self/Family Care:        Other:                  Vac Discharge Needs:   Not Applicable Pt not on a wound vac:       Regular Vac while inpatient, alternative dressing at DC:        Regular Vac in use and continued at DC:          x  Reg. Vac w/ Skin Sub/Biologic in use. Will need to be changed 2x wkly:      Veraflo Vac while inpatient, ok to transition to Regular Vac on Discharge:           Veraflo Vac while inpatient, will need to remain on Veraflo Vac upon discharge:

## 2021-10-27 NOTE — CARE PLAN
Problem: Knowledge Deficit - Standard  Goal: Patient and family/care givers will demonstrate understanding of plan of care, disease process/condition, diagnostic tests and medications  Outcome: Progressing     Problem: Pain - Standard  Goal: Alleviation of pain or a reduction in pain to the patient’s comfort goal  Outcome: Progressing   The patient is Stable - Low risk of patient condition declining or worsening    Shift Goals  Clinical Goals: reorient patient; patient remain dry; safety  Patient Goals: rest    Progress made toward(s) clinical / shift goals:  Pt has improved cognizance has improved.  Pt calls appropriately to use the bathroom.  Pt understands the need to gain strength and independence in order to return to her home.     Patient is not progressing towards the following goals:

## 2021-10-27 NOTE — DISCHARGE PLANNING
Agency/Facility Name: [facility]  Spoke To: Alanis  Outcome: Pt declined. Living situation and non compliance.    LSW informed

## 2021-10-28 LAB
ALBUMIN SERPL BCP-MCNC: 3 G/DL (ref 3.2–4.9)
AMMONIA PLAS-SCNC: 19 UMOL/L (ref 11–45)
BASOPHILS # BLD AUTO: 1.5 % (ref 0–1.8)
BASOPHILS # BLD: 0.11 K/UL (ref 0–0.12)
BUN SERPL-MCNC: 7 MG/DL (ref 8–22)
CALCIUM SERPL-MCNC: 9.1 MG/DL (ref 8.5–10.5)
CHLORIDE SERPL-SCNC: 102 MMOL/L (ref 96–112)
CO2 SERPL-SCNC: 24 MMOL/L (ref 20–33)
CREAT SERPL-MCNC: 0.46 MG/DL (ref 0.5–1.4)
EOSINOPHIL # BLD AUTO: 0.44 K/UL (ref 0–0.51)
EOSINOPHIL NFR BLD: 6.1 % (ref 0–6.9)
ERYTHROCYTE [DISTWIDTH] IN BLOOD BY AUTOMATED COUNT: 49.3 FL (ref 35.9–50)
GLUCOSE SERPL-MCNC: 107 MG/DL (ref 65–99)
HCT VFR BLD AUTO: 35.9 % (ref 37–47)
HGB BLD-MCNC: 11.4 G/DL (ref 12–16)
HIV 1+2 AB+HIV1 P24 AG SERPL QL IA: NORMAL
IMM GRANULOCYTES # BLD AUTO: 0.09 K/UL (ref 0–0.11)
IMM GRANULOCYTES NFR BLD AUTO: 1.3 % (ref 0–0.9)
LYMPHOCYTES # BLD AUTO: 1.61 K/UL (ref 1–4.8)
LYMPHOCYTES NFR BLD: 22.4 % (ref 22–41)
MCH RBC QN AUTO: 28 PG (ref 27–33)
MCHC RBC AUTO-ENTMCNC: 31.8 G/DL (ref 33.6–35)
MCV RBC AUTO: 88.2 FL (ref 81.4–97.8)
MONOCYTES # BLD AUTO: 0.87 K/UL (ref 0–0.85)
MONOCYTES NFR BLD AUTO: 12.1 % (ref 0–13.4)
NEUTROPHILS # BLD AUTO: 4.06 K/UL (ref 2–7.15)
NEUTROPHILS NFR BLD: 56.6 % (ref 44–72)
NRBC # BLD AUTO: 0 K/UL
NRBC BLD-RTO: 0 /100 WBC
PHOSPHATE SERPL-MCNC: 2.8 MG/DL (ref 2.5–4.5)
PLATELET # BLD AUTO: 386 K/UL (ref 164–446)
PMV BLD AUTO: 9.2 FL (ref 9–12.9)
POTASSIUM SERPL-SCNC: 3.7 MMOL/L (ref 3.6–5.5)
RBC # BLD AUTO: 4.07 M/UL (ref 4.2–5.4)
SODIUM SERPL-SCNC: 137 MMOL/L (ref 135–145)
TREPONEMA PALLIDUM IGG+IGM AB [PRESENCE] IN SERUM OR PLASMA BY IMMUNOASSAY: NORMAL
TSH SERPL DL<=0.005 MIU/L-ACNC: 3.06 UIU/ML (ref 0.38–5.33)
VIT B12 SERPL-MCNC: 946 PG/ML (ref 211–911)
WBC # BLD AUTO: 7.2 K/UL (ref 4.8–10.8)

## 2021-10-28 PROCEDURE — 82607 VITAMIN B-12: CPT

## 2021-10-28 PROCEDURE — 80069 RENAL FUNCTION PANEL: CPT

## 2021-10-28 PROCEDURE — 99232 SBSQ HOSP IP/OBS MODERATE 35: CPT | Performed by: HOSPITALIST

## 2021-10-28 PROCEDURE — 700102 HCHG RX REV CODE 250 W/ 637 OVERRIDE(OP): Performed by: STUDENT IN AN ORGANIZED HEALTH CARE EDUCATION/TRAINING PROGRAM

## 2021-10-28 PROCEDURE — 82140 ASSAY OF AMMONIA: CPT

## 2021-10-28 PROCEDURE — 85025 COMPLETE CBC W/AUTO DIFF WBC: CPT

## 2021-10-28 PROCEDURE — 700102 HCHG RX REV CODE 250 W/ 637 OVERRIDE(OP): Performed by: INTERNAL MEDICINE

## 2021-10-28 PROCEDURE — A9270 NON-COVERED ITEM OR SERVICE: HCPCS | Performed by: STUDENT IN AN ORGANIZED HEALTH CARE EDUCATION/TRAINING PROGRAM

## 2021-10-28 PROCEDURE — 770001 HCHG ROOM/CARE - MED/SURG/GYN PRIV*

## 2021-10-28 PROCEDURE — 87389 HIV-1 AG W/HIV-1&-2 AB AG IA: CPT

## 2021-10-28 PROCEDURE — 36415 COLL VENOUS BLD VENIPUNCTURE: CPT

## 2021-10-28 PROCEDURE — 86780 TREPONEMA PALLIDUM: CPT

## 2021-10-28 PROCEDURE — A9270 NON-COVERED ITEM OR SERVICE: HCPCS | Performed by: INTERNAL MEDICINE

## 2021-10-28 PROCEDURE — 84443 ASSAY THYROID STIM HORMONE: CPT

## 2021-10-28 RX ADMIN — OXYCODONE HYDROCHLORIDE 10 MG: 10 TABLET ORAL at 16:30

## 2021-10-28 RX ADMIN — LEVOTHYROXINE SODIUM 50 MCG: 0.05 TABLET ORAL at 04:40

## 2021-10-28 RX ADMIN — RIVAROXABAN 20 MG: 20 TABLET, FILM COATED ORAL at 16:30

## 2021-10-28 RX ADMIN — OXYCODONE 5 MG: 5 TABLET ORAL at 04:37

## 2021-10-28 RX ADMIN — METOPROLOL TARTRATE 25 MG: 25 TABLET, FILM COATED ORAL at 04:40

## 2021-10-28 RX ADMIN — OXYCODONE HYDROCHLORIDE 10 MG: 10 TABLET ORAL at 08:56

## 2021-10-28 RX ADMIN — METOPROLOL TARTRATE 25 MG: 25 TABLET, FILM COATED ORAL at 16:30

## 2021-10-28 ASSESSMENT — PAIN DESCRIPTION - PAIN TYPE
TYPE: ACUTE PAIN

## 2021-10-28 NOTE — CARE PLAN
Problem: Knowledge Deficit - Standard  Goal: Patient and family/care givers will demonstrate understanding of plan of care, disease process/condition, diagnostic tests and medications  Outcome: Progressing     Problem: Pain - Standard  Goal: Alleviation of pain or a reduction in pain to the patient’s comfort goal  Outcome: Progressing     The patient is Stable - Low risk of patient condition declining or worsening    Shift Goals  Clinical Goals: (P) safety, reorient patient  Patient Goals: (P) comfort    Progress made toward(s) clinical / shift goals: Pain managed with medication and rest. POC discussed with pt-pt verbalized understanding    Patient is not progressing towards the following goals:

## 2021-10-28 NOTE — ASSESSMENT & PLAN NOTE
I discussed the case with the patient's sister/patient Daughter over the phone .  I discussed advance care planning  For at least 35 minutes over the phone as patient daughter/sister could not be available face-to-face.,  I discussed with them including diagnosis, prognosis, plan of care, risks and benefits of any therapies that could be offered, as well as alternatives including palliation and hospice, as appropriate.  I placed a hospice referral  Changed CODE STATUS DNI/DN AR

## 2021-10-28 NOTE — CARE PLAN
Problem: Nutritional:  Goal: Achieve adequate nutritional intake  Description: Patient will consume 25-50% of meals/supplements  Outcome: Progressing    Pt is mainly eating 0-50% of meals and ate % of one meal. Pt has Boost Plus with meals in place. Pt asleep, observed Boost at bedside. RN reports ate <25% of breakfast but did drink her Boost shake.    Encourage PO intake as able, continue liberalized diet and supplements.

## 2021-10-28 NOTE — DISCHARGE PLANNING
Received Choice form at 115  Agency/Facility Name: [facility]  Referral sent per Choice form @ 1154      LSW informed

## 2021-10-28 NOTE — THERAPY
Missed Therapy     Patient Name: Idalia Freitas  Age:  81 y.o., Sex:  female  Medical Record #: [id number]  Today's Date: 10/28/2021    Discussed missed therapy with RN       10/28/21 1253   Interdisciplinary Plan of Care Collaboration   Collaboration Comments Pt will be discharging home on hospice care per chart, has declined rehab placement. Will discharge therapy order at this time, please reorder if pts discharge plan changes and therapy needs arise.

## 2021-10-28 NOTE — PROGRESS NOTES
Hospital Medicine Daily Progress Note    Date of Service  10/27/2021    Chief Complaint  Idalia HALE is a 81 y.o. female admitted 10/24/2021 with sepsis, ground-level fall, right acromial distal fracture, UTI    Hospital Course  This is a 81 -year-old female with a past medical significant for COPD not on home O2 , urinary incontinence, diabetes mellitus, prior VT, hypertension  A. fib, secondary hypercoagulable state, not on anticoagulation, lung cancer diagnosed in July 2021, chronic back wound with wound VAC in place, followed by Dr. Canales and wound care team was transferred from [facility] on 10/24/2021.    Patient reports having a ground-level fall, leading to distal clavicle fracture hypokalemia resume 2.6, septic with elevated WBC 11.9, tachycardic with heart rate of 116.    Unfortunately blood cultures were not obtained during the admission secondary to urinary tract infection.  Urine culture was obtained, patient was started on ceftriaxone.  Regarding her distal clavicle fracture, orthopedic surgery  Dr Whalen was consulted, recommended sling for comfort, pain control, recommended following up with repeat x-ray in 2 weeks.    Patient noted that patient was encephalopathic thought to be secondary to urinary tract infection.  Upon my evaluation today, she is noted to be alert and oriented 2-3; not able to answer complex questions.  PT/OT has evaluated the patient and recommend postacute.  Patient needs maximum assist at this time.  She is not safe to go home.    I called patient sister, she does for Dr. Chen of radiation oncology at [facility], she has an MRI of the brain obtained 10/20/2021, requesting records.  Patient sister stated that he has been hospitalized at [facility] and received 4 to 6 weeks of IV antibiotic for her back wound.  Wound care has been consulted during the stay in the hospital.  Blood culture has been ordered    Interval Problem Update  10/26:  No  acute events overnight, patient is alert and oriented x2-3.  Vital signs stable, currently saturating well on room air.  Blood culture has been ordered, urine culture growing greater than 100,000 ESBL E. coli, will provide fosfomycin  --Noted to be anemic with hemoglobin of 11.5, will transfuse if less than 7  PT/OT evaluated, recommend postacute.  Patient does not wanted to go to skilled nursing facility as he is max assist    Patient is alert and oriented x1-2, her mentation fluctuating, cognitive evaluation ordered.  Patient does not have POA for medical.    10/27:  --Patient continued on comfort monitoring related x2.  I had an extensive discussion with the patient oldest daughter and patient sister, CODE STATUS has been changed to DNI/DN AR.  After next discussion with the patient sister/daughter, hospice referral has been placed.  --I also discussed the case with Dr. Fairbanks of radiation oncology, it is noted that patient has stage III to stage IV lung cancer which has been progressed since May.  Patient had not received any chemotherapy/radiation therapy.  Dr. Hoyt of oncology has evaluated the patient on May 2021 who stated that because of the patient's poor performance status she is not a candidate of chemotherapy.    --I discussed with radiation oncology who stated that because of the patient worsening mental status.  Patient is a candidate of hospice option extensive discussion of the patient/discussed-over the phone, decision was made to have a hospice referral.    Consultants/Specialty  orthopedics    Code Status  DNAR/DNI    Disposition  Patient is not medically cleared.   Anticipate discharge to to be determine .  I have placed the appropriate orders for post-discharge needs.    Review of Systems  Limited secondary to patient pain mentation    Physical Exam  Temp:  [36.3 °C (97.4 °F)-36.6 °C (97.9 °F)] 36.6 °C (97.9 °F)  Pulse:  [] 103  Resp:  [16] 16  BP: (105-117)/(67-78) 117/71  SpO2:  [91  %-92 %] 91 %    Physical Exam  Vitals and nursing note reviewed.   Constitutional:       General: She is not in acute distress.     Appearance: She is ill-appearing.      Comments: Frail,    HENT:      Head: Normocephalic and atraumatic.   Eyes:      Extraocular Movements: Extraocular movements intact.   Cardiovascular:      Rate and Rhythm: Normal rate.      Heart sounds: Murmur heard.     Pulmonary:      Effort: Pulmonary effort is normal. No respiratory distress.      Breath sounds: No wheezing.   Abdominal:      General: There is no distension.      Palpations: Abdomen is soft.      Tenderness: There is no abdominal tenderness.      Comments: Noted to have wound VAC in her back   Musculoskeletal:      Comments: Wound vac on her back    Skin:     General: Skin is dry.      Coloration: Skin is not jaundiced.   Neurological:      Motor: No weakness.      Comments: Knows her name, knows the name of the president.  Does follow commands intermittently   Psychiatric:         Mood and Affect: Mood normal.         Fluids  No intake or output data in the 24 hours ending 10/27/21 2025    Laboratory  Recent Labs     10/25/21  0432 10/26/21  0349   WBC 10.5 7.3   RBC 4.73 4.18*   HEMOGLOBIN 13.4 11.5*   HEMATOCRIT 42.1 37.3   MCV 89.0 89.2   MCH 28.3 27.5   MCHC 31.8* 30.8*   RDW 51.1* 50.4*   PLATELETCT 354 362   MPV 9.5 9.6     Recent Labs     10/25/21  0432 10/26/21  0349   SODIUM 139 139   POTASSIUM 3.3* 3.8   CHLORIDE 106 107   CO2 20 24   GLUCOSE 102* 100*   BUN 8 8   CREATININE 0.46* 0.39*   CALCIUM 9.1 9.3                   Imaging  DX-CHEST-LIMITED (1 VIEW)   Final Result      1.  Nodular opacity in the left lower lobe, possibly representing early pneumonia or atelectasis. Follow-up imaging is recommended to document resolution.      2.  Thickening of the right minor fissure, possibly related to scarring or atelectasis.      3.  Increased nodular densities projecting over the right lower lung, possibly external to  the patient. CT of the chest may be helpful for further evaluation.      CT-HEAD W/O   Final Result      1.  Cerebral atrophy.      2.  White matter lucencies most consistent with small vessel ischemic change versus demyelination or gliosis.      3.  Otherwise, Head CT without contrast with no acute findings. No evidence of acute cerebral infarction, hemorrhage or mass lesion.      DX-SHOULDER 2+ RIGHT   Final Result         Acute comminuted and mildly displaced fracture of the distal clavicle. No involvement of the AC joint.           Assessment/Plan  Advance care planning  Assessment & Plan   I discussed the case with the patient's sister/patient Daughter over the phone .  I discussed advance care planning  For at least 35 minutes over the phone as patient daughter/sister could not be available face-to-face.,  I discussed with them including diagnosis, prognosis, plan of care, risks and benefits of any therapies that could be offered, as well as alternatives including palliation and hospice, as appropriate.  I placed a hospice referral  Changed CODE STATUS DNI/DN AR      Infection of lumbar spine (HCC)- (present on admission)  Assessment & Plan  She has wound vac in place  Follows with Dr. Hicks  No other records available     Closed nondisplaced fracture of acromial end of right clavicle- (present on admission)  Assessment & Plan  Patient was transferred with a sling, will continue for support.  Pain control.  PT/OT  Right upper extremity neurovascularly intact, range of motion intact  No surgery per ortho  Needs to follow up as OP     Encephalopathy- (present on admission)  Assessment & Plan  In setting of UTI and sepsis verss  --Patient mentation has not been cleared, MRI of the brain that was obtained at [facility] pending  Obtain records from outlying facility    Chronic pain syndrome- (present on admission)  Assessment & Plan  will hold opoid     Acquired hypothyroidism- (present on admission)  Assessment &  Plan  Continue Synthroid 50 mcg daily    Paroxysmal atrial fibrillation (HCC)- (present on admission)  Assessment & Plan  Rate well controlled on metoprolol tartrate 25 mg p.o. twice daily, continue Xarelto for secondary hypercoagulable state    Sepsis (HCC)- (present on admission)  Assessment & Plan  This is Sepsis Present on admission  SIRS criteria identified on my evaluation include: Tachycardia, with heart rate greater than 90 BPM, Tachypnea, with respirations greater than 20 per minute and Leukocytosis, with WBC greater than 12,000  Source is Urinary  Sepsis protocol initiated  Fluid resuscitation ordered per protocol  IV antibiotics as appropriate for source of sepsis  While organ dysfunction may be noted elsewhere in this problem list or in the chart, degree of organ dysfunction does not meet CMS criteria for severe sepsis  She was started on ceftriaxone,  Will rovide fosphomycin x 1    COPD (chronic obstructive pulmonary disease) (HCC)- (present on admission)  Assessment & Plan  Not in acute exacerbation.  DuoNebs as needed.      Non-small cell lung cancer (NSCLC) (HCC)- (present on admission)  Assessment & Plan  Follows with radiation oncology Dr. Fairbanks at [facility] she discussed with Dr. Fairbanks for radiation oncology.  Patient was evaluated by medical oncology on 5/2021, stated and not a candidate for chemotherapy due to patient performance status.  After discussing extensively with radiation oncology sister that patient is a candidate for hospice.  I discussed the case with patient's daughter/sister, hospice referral has been placed           I have performed a physical exam and reviewed and updated ROS and Plan today (10/27/2021). In review of yesterday's note (10/26/2021), there are no changes except as documented above.   VTE prophylaxis: therapeutic anticoagulation with xarelto

## 2021-10-28 NOTE — PROGRESS NOTES
Hospital Medicine Daily Progress Note    Date of Service  10/28/2021    Chief Complaint  Idalia HALE is a 81 y.o. female admitted 10/24/2021 with sepsis, ground-level fall, right acromial distal fracture, UTI    Hospital Course  This is a 81 -year-old female with a past medical significant for COPD not on home O2 , urinary incontinence, diabetes mellitus, prior VT, hypertension  A. fib, secondary hypercoagulable state, not on anticoagulation, lung cancer diagnosed in July 2021, chronic back wound with wound VAC in place, followed by Dr. Canales and wound care team was transferred from [facility] on 10/24/[facility]21.    Patient reports having a ground-level fall, leading to distal clavicle fracture hypokalemia resume 2.6, septic with elevated WBC 11.9, tachycardic with heart rate of 116.    Unfortunately blood cultures were not obtained during the admission secondary to urinary tract infection.  Urine culture was obtained, patient was started on ceftriaxone.  Regarding her distal clavicle fracture, orthopedic surgery  Dr Whalen was consulted, recommended sling for comfort, pain control, recommended following up with repeat x-ray in 2 weeks.    Patient noted that patient was encephalopathic thought to be secondary to urinary tract infection.  Upon my evaluation today, she is noted to be alert and oriented 2-3; not able to answer complex questions.  PT/OT has evaluated the patient and recommend postacute.  Patient needs maximum assist at this time.  She is not safe to go home.    I called patient sister, she does for Dr. Chen of radiation oncology at [facility], she has an MRI of the brain obtained 10/20/2021, requesting records.  Patient sister stated that he has been hospitalized at [facility] and received 4 to 6 weeks of IV antibiotic for her back wound.  Wound care has been consulted during the stay in the hospital.  Blood culture has been ordered    Interval Problem Update  10/26:  No  acute events overnight, patient is alert and oriented x2-3.  Vital signs stable, currently saturating well on room air.  Blood culture has been ordered, urine culture growing greater than 100,000 ESBL E. coli, will provide fosfomycin  --Noted to be anemic with hemoglobin of 11.5, will transfuse if less than 7  PT/OT evaluated, recommend postacute.  Patient does not wanted to go to skilled nursing facility as he is max assist    Patient is alert and oriented x1-2, her mentation fluctuating, cognitive evaluation ordered.  Patient does not have POA for medical.    10/27:  --Patient continued to be confused elated x2.  I had an extensive discussion with the patient oldest daughter and patient sister, CODE STATUS has been changed to DNI/DN AR.  After next discussion with the patient sister/daughter, hospice referral has been placed.  --I also discussed the case with Dr. Fairbanks of radiation oncology, it is noted that patient has stage III to stage IV lung cancer which has been progressed since May.  Patient had not received any chemotherapy/radiation therapy.  Dr. Hoyt of oncology has evaluated the patient on May 2021 who stated that because of the patient's poor performance status she is not a candidate of chemotherapy.    --I discussed with radiation oncology who stated that because of the patient worsening mental status.  Patient is a candidate of hospice option extensive discussion of the patient/discussed-over the phone, decision was made to have a hospice referral.    10/28:  --No acute events overnight, laying in bed, denies any complaint, she continues to remain confused  --Hospice referral has been sent  Patient is medically stable to be discharged to home on home hospice    Consultants/Specialty  orthopedics    Code Status  DNAR/DNI    Disposition  Patient is  Medically cleared to  Be discharged to home on home hospice   Anticipate discharge to to be determine .  I have placed the appropriate orders for  post-discharge needs.    Review of Systems  Limited secondary to patient pain mentation    Physical Exam  Temp:  [36 °C (96.8 °F)-36.6 °C (97.9 °F)] 36.6 °C (97.9 °F)  Pulse:  [] 91  Resp:  [15-16] 15  BP: (102-117)/(64-74) 102/64  SpO2:  [90 %-93 %] 90 %    Physical Exam  Vitals and nursing note reviewed.   Constitutional:       General: She is not in acute distress.     Appearance: She is ill-appearing.      Comments: Frail,    HENT:      Head: Normocephalic and atraumatic.   Eyes:      Extraocular Movements: Extraocular movements intact.   Cardiovascular:      Rate and Rhythm: Normal rate.      Heart sounds: Murmur heard.     Pulmonary:      Effort: Pulmonary effort is normal. No respiratory distress.      Breath sounds: No wheezing.   Abdominal:      General: There is no distension.      Palpations: Abdomen is soft.      Tenderness: There is no abdominal tenderness.      Comments: Noted to have wound VAC in her back   Musculoskeletal:      Comments: Wound vac on her back    Skin:     General: Skin is dry.      Coloration: Skin is not jaundiced.   Neurological:      Motor: No weakness.      Comments: Knows her name, knows that she is in the hospital.   Psychiatric:         Mood and Affect: Mood normal.         Fluids    Intake/Output Summary (Last 24 hours) at 10/28/2021 1156  Last data filed at 10/28/2021 1035  Gross per 24 hour   Intake 120 ml   Output --   Net 120 ml       Laboratory  Recent Labs     10/26/21  0349 10/28/21  0405   WBC 7.3 7.2   RBC 4.18* 4.07*   HEMOGLOBIN 11.5* 11.4*   HEMATOCRIT 37.3 35.9*   MCV 89.2 88.2   MCH 27.5 28.0   MCHC 30.8* 31.8*   RDW 50.4* 49.3   PLATELETCT 362 386   MPV 9.6 9.2     Recent Labs     10/26/21  0349 10/28/21  0405   SODIUM 139 137   POTASSIUM 3.8 3.7   CHLORIDE 107 102   CO2 24 24   GLUCOSE 100* 107*   BUN 8 7*   CREATININE 0.39* 0.46*   CALCIUM 9.3 9.1                   Imaging  DX-CHEST-LIMITED (1 VIEW)   Final Result      1.  Nodular opacity in the left  lower lobe, possibly representing early pneumonia or atelectasis. Follow-up imaging is recommended to document resolution.      2.  Thickening of the right minor fissure, possibly related to scarring or atelectasis.      3.  Increased nodular densities projecting over the right lower lung, possibly external to the patient. CT of the chest may be helpful for further evaluation.      CT-HEAD W/O   Final Result      1.  Cerebral atrophy.      2.  White matter lucencies most consistent with small vessel ischemic change versus demyelination or gliosis.      3.  Otherwise, Head CT without contrast with no acute findings. No evidence of acute cerebral infarction, hemorrhage or mass lesion.      DX-SHOULDER 2+ RIGHT   Final Result         Acute comminuted and mildly displaced fracture of the distal clavicle. No involvement of the AC joint.           Assessment/Plan  Advance care planning  Assessment & Plan   I discussed the case with the patient's sister/patient Daughter over the phone .  I discussed advance care planning  For at least 35 minutes over the phone as patient daughter/sister could not be available face-to-face.,  I discussed with them including diagnosis, prognosis, plan of care, risks and benefits of any therapies that could be offered, as well as alternatives including palliation and hospice, as appropriate.  I placed a hospice referral  Changed CODE STATUS DNI/DN AR      Infection of lumbar spine (HCC)- (present on admission)  Assessment & Plan  She has wound vac in place  Follows with Dr. Canales  No other records available     Closed nondisplaced fracture of acromial end of right clavicle- (present on admission)  Assessment & Plan  Patient was transferred with a sling, will continue for support.  Pain control.  PT/OT  Right upper extremity neurovascularly intact, range of motion intact  No surgery per ortho  Needs to follow up as OP     Encephalopathy- (present on admission)  Assessment & Plan  In setting  of UTI and sepsis verss  --Patient mentation has not been cleared, MRI of the brain that was obtained at [facility] pending   Pending records from outlying facility    Chronic pain syndrome- (present on admission)  Assessment & Plan  will hold opoid     Acquired hypothyroidism- (present on admission)  Assessment & Plan  Continue Synthroid 50 mcg daily    Paroxysmal atrial fibrillation (HCC)- (present on admission)  Assessment & Plan  Rate well controlled on metoprolol tartrate 25 mg p.o. twice daily, continue Xarelto for secondary hypercoagulable state    Sepsis (HCC)- (present on admission)  Assessment & Plan  This is Sepsis Present on admission  SIRS criteria identified on my evaluation include: Tachycardia, with heart rate greater than 90 BPM, Tachypnea, with respirations greater than 20 per minute and Leukocytosis, with WBC greater than 12,000  Source is Urinary  Sepsis protocol initiated  Fluid resuscitation ordered per protocol  IV antibiotics as appropriate for source of sepsis  While organ dysfunction may be noted elsewhere in this problem list or in the chart, degree of organ dysfunction does not meet CMS criteria for severe sepsis  She was started on ceftriaxone,  Will rovide fosphomycin x 1    COPD (chronic obstructive pulmonary disease) (HCC)- (present on admission)  Assessment & Plan  Not in acute exacerbation.  DuoNebs as needed.      Non-small cell lung cancer (NSCLC) (HCC)- (present on admission)  Assessment & Plan  Follows with radiation oncology Dr. Fairbanks at [facility]   Case has been  discussed with Dr. Fairbanks for radiation oncology.     ---Patient was evaluated by medical oncology on 5/2021, stated and not a candidate for chemotherapy due to patient performance status.  After discussing extensively with radiation oncology sister that patient is a candidate for hospice.  I discussed the case with patient's daughter/sister, hospice referral has been placed  CM updated , pending hospice evaluation            I have performed a physical exam and reviewed and updated ROS and Plan today (10/28/2021). In review of yesterday's note (10/27/2021), there are no changes except as documented above.   VTE prophylaxis: therapeutic anticoagulation with xarelto

## 2021-10-28 NOTE — DISCHARGE PLANNING
Received Choice form at 1242  Agency/Facility Name: [facility]  Referral sent per Choice form @ 1242      LSW informed

## 2021-10-28 NOTE — CARE PLAN
The patient is Stable - Low risk of patient condition declining or worsening    Shift Goals  Clinical Goals: reorient patient; patient remain dry; safety  Patient Goals: rest    Progress made toward(s) clinical / shift goals:        Problem: Knowledge Deficit - Standard  Goal: Patient and family/care givers will demonstrate understanding of plan of care, disease process/condition, diagnostic tests and medications  Outcome: Progressing  Note: Pt updated on plan of care. Pt encouraged to ask questions and questions were answered. Call light within reach. Pt reminded to use call light whenever needing assistance.       Problem: Fall Risk  Goal: Patient will remain free from falls  Outcome: Progressing  Note: Non-slip socks in use. Bed locked and in lowest position. Call light is within reach.  Pt reminded to call before getting out of bed.        Patient is not progressing towards the following goals:    N/a

## 2021-10-28 NOTE — DISCHARGE PLANNING
Agency/Facility Name: [facility]  Spoke To: Susie  Outcome: Pt declined. Non covered service area.      LSW informed

## 2021-10-28 NOTE — DISCHARGE PLANNING
Anticipated Discharge Disposition: Home with hospice.    Action: LSW spoke with patient's daughter, Tiffany (#[phone]), and sister, Jl (#[phone]), by group call to discuss discharge plan. Patient resides in [place] and has [employer] Medicare. Both Tiffany and Jl agreeable to sending blanket hospice choice to all hospice who provide services to [place]. DPA tasked with sending out hospice referral.    Barriers to Discharge: Hospice acceptance.    Plan: CM to continue to follow for discharge needs.

## 2021-10-29 PROCEDURE — 302098 PASTE RING (FLAT): Performed by: HOSPITALIST

## 2021-10-29 PROCEDURE — A9270 NON-COVERED ITEM OR SERVICE: HCPCS | Performed by: STUDENT IN AN ORGANIZED HEALTH CARE EDUCATION/TRAINING PROGRAM

## 2021-10-29 PROCEDURE — 700102 HCHG RX REV CODE 250 W/ 637 OVERRIDE(OP): Performed by: STUDENT IN AN ORGANIZED HEALTH CARE EDUCATION/TRAINING PROGRAM

## 2021-10-29 PROCEDURE — 700102 HCHG RX REV CODE 250 W/ 637 OVERRIDE(OP): Performed by: HOSPITALIST

## 2021-10-29 PROCEDURE — A9270 NON-COVERED ITEM OR SERVICE: HCPCS | Performed by: INTERNAL MEDICINE

## 2021-10-29 PROCEDURE — 700102 HCHG RX REV CODE 250 W/ 637 OVERRIDE(OP): Performed by: INTERNAL MEDICINE

## 2021-10-29 PROCEDURE — 770001 HCHG ROOM/CARE - MED/SURG/GYN PRIV*

## 2021-10-29 PROCEDURE — A9270 NON-COVERED ITEM OR SERVICE: HCPCS | Performed by: HOSPITALIST

## 2021-10-29 PROCEDURE — 99232 SBSQ HOSP IP/OBS MODERATE 35: CPT | Performed by: HOSPITALIST

## 2021-10-29 RX ADMIN — OXYCODONE 5 MG: 5 TABLET ORAL at 05:28

## 2021-10-29 RX ADMIN — RIVAROXABAN 20 MG: 20 TABLET, FILM COATED ORAL at 18:28

## 2021-10-29 RX ADMIN — ACETAMINOPHEN 650 MG: 325 TABLET ORAL at 18:28

## 2021-10-29 RX ADMIN — OXYCODONE HYDROCHLORIDE 10 MG: 10 TABLET ORAL at 18:28

## 2021-10-29 RX ADMIN — DOCUSATE SODIUM 50 MG AND SENNOSIDES 8.6 MG 2 TABLET: 8.6; 5 TABLET, FILM COATED ORAL at 18:28

## 2021-10-29 RX ADMIN — OXYCODONE HYDROCHLORIDE 10 MG: 10 TABLET ORAL at 15:42

## 2021-10-29 RX ADMIN — METOPROLOL TARTRATE 25 MG: 25 TABLET, FILM COATED ORAL at 18:28

## 2021-10-29 RX ADMIN — METOPROLOL TARTRATE 25 MG: 25 TABLET, FILM COATED ORAL at 05:28

## 2021-10-29 RX ADMIN — LEVOTHYROXINE SODIUM 50 MCG: 0.05 TABLET ORAL at 05:28

## 2021-10-29 ASSESSMENT — PAIN DESCRIPTION - PAIN TYPE
TYPE: ACUTE PAIN

## 2021-10-29 NOTE — CARE PLAN
The patient is Stable - Low risk of patient condition declining or worsening    Shift Goals  Clinical Goals: safety, reorient patient  Patient Goals: comfort    Progress made toward(s) clinical / shift goals:      Problem: Fall Risk  Goal: Patient will remain free from falls  Outcome: Progressing  Note: Non-slip socks in use. Bed locked and in lowest position. Call light is within reach.  Pt reminded to call before getting out of bed.         Problem: Pain - Standard  Goal: Alleviation of pain or a reduction in pain to the patient’s comfort goal  Outcome: Progressing  Note: Pt assessed for pain throughout the night. Pt able to sleep throughout the night.        Patient is not progressing towards the following goals:    N/a

## 2021-10-29 NOTE — PROGRESS NOTES
Hospital Medicine Daily Progress Note    Date of Service  10/29/2021    Chief Complaint  Idalia HALE is a 81 y.o. female admitted 10/24/2021 with sepsis, ground-level fall, right acromial distal fracture, UTI    Hospital Course  This is a 81 -year-old female with a past medical significant for COPD not on home O2 , urinary incontinence, diabetes mellitus, prior VT, hypertension  A. fib, secondary hypercoagulable state, not on anticoagulation, lung cancer diagnosed in July 2021, chronic back wound with wound VAC in place, followed by Dr. Canales and wound care team was transferred from [facility] on 10/24/2021.    Patient reports having a ground-level fall, leading to distal clavicle fracture hypokalemia resume 2.6, septic with elevated WBC 11.9, tachycardic with heart rate of 116.    Unfortunately blood cultures were not obtained during the admission secondary to urinary tract infection.  Urine culture was obtained, patient was started on ceftriaxone.  Regarding her distal clavicle fracture, orthopedic surgery  Dr Whalen was consulted, recommended sling for comfort, pain control, recommended following up with repeat x-ray in 2 weeks.    Patient noted that patient was encephalopathic thought to be secondary to urinary tract infection.  Upon my evaluation today, she is noted to be alert and oriented 2-3; not able to answer complex questions.  PT/OT has evaluated the patient and recommend postacute.  Patient needs maximum assist at this time.  She is not safe to go home.    I called patient sister, she does for Dr. Chen of radiation oncology at [place], she has an MRI of the brain obtained 10/20/2021, requesting records.  Patient sister stated that he has been hospitalized at [place] and received 4 to 6 weeks of IV antibiotic for her back wound.  Wound care has been consulted during the stay in the hospital.  Blood culture has been ordered    Interval Problem Update  10/26:  No  acute events overnight, patient is alert and oriented x2-3.  Vital signs stable, currently saturating well on room air.  Blood culture has been ordered, urine culture growing greater than 100,000 ESBL E. coli, will provide fosfomycin  --Noted to be anemic with hemoglobin of 11.5, will transfuse if less than 7  PT/OT evaluated, recommend postacute.  Patient does not wanted to go to skilled nursing facility as he is max assist    Patient is alert and oriented x1-2, her mentation fluctuating, cognitive evaluation ordered.  Patient does not have POA for medical.    10/27:  --Patient continued to be confused elated x2.  I had an extensive discussion with the patient oldest daughter and patient sister, CODE STATUS has been changed to DNI/DN AR.  After next discussion with the patient sister/daughter, hospice referral has been placed.  --I also discussed the case with Dr. Fairabnks of radiation oncology, it is noted that patient has stage III to stage IV lung cancer which has been progressed since May.  Patient had not received any chemotherapy/radiation therapy.  Dr. Hoyt of oncology has evaluated the patient on May 2021 who stated that because of the patient's poor performance status she is not a candidate of chemotherapy.    --I discussed with radiation oncology who stated that because of the patient worsening mental status.  Patient is a candidate of hospice option extensive discussion of the patient/discussed-over the phone, decision was made to have a hospice referral.    10/28:  --No acute events overnight, laying in bed, denies any complaint, she continues to remain confused  --Hospice referral has been sent  Patient is medically stable to be discharged to home on home hospice    10/29:  --No acute events overnight, vital signs stable.  I called the patient's sister, who wanted her to be comfortable.  --She does have a wound in her back  She has been accepted by hospice and significant need for chemotherapy  considering patient poor performance status as per Medical oncology in 5/2021 and radiation oncology.  Patient cancer continue to grow during this.  Since diagnosis in May 2021.  --She is very frail debilitated    Medically cleared to be discharged to group home on Hospice    Consultants/Specialty  orthopedics    Code Status  DNAR/DNI    Disposition  Patient is  Medically cleared to be discharged to group home on hospice    Anticipate discharge to to be determine .  I have placed the appropriate orders for post-discharge needs.    Review of Systems  Limited secondary to patient pain mentation    Physical Exam  Temp:  [36.1 °C (97 °F)-37.1 °C (98.8 °F)] 36.3 °C (97.4 °F)  Pulse:  [] 111  Resp:  [16-17] 17  BP: ()/(62-69) 103/69  SpO2:  [88 %-96 %] 88 %    Physical Exam  Vitals and nursing note reviewed.   Constitutional:       Appearance: She is ill-appearing.      Comments:  Frail and awake    HENT:      Head: Normocephalic and atraumatic.   Eyes:      Extraocular Movements: Extraocular movements intact.   Cardiovascular:      Rate and Rhythm: Normal rate.      Heart sounds: Murmur heard.     Pulmonary:      Effort: Pulmonary effort is normal. No respiratory distress.      Breath sounds: No wheezing.   Abdominal:      General: There is no distension.      Palpations: Abdomen is soft.      Tenderness: There is no abdominal tenderness.      Comments: Noted to have wound VAC in her back   Musculoskeletal:      Cervical back: Neck supple.      Comments: Wound vac on her back    Skin:     General: Skin is dry.      Coloration: Skin is not jaundiced.   Neurological:      Mental Status: She is alert.      Motor: No weakness.      Comments: Knows her name     Psychiatric:         Mood and Affect: Mood normal.         Fluids    Intake/Output Summary (Last 24 hours) at 10/29/2021 1247  Last data filed at 10/28/2021 1430  Gross per 24 hour   Intake 60 ml   Output --   Net 60 ml       Laboratory  Recent Labs      10/28/21  0405   WBC 7.2   RBC 4.07*   HEMOGLOBIN 11.4*   HEMATOCRIT 35.9*   MCV 88.2   MCH 28.0   MCHC 31.8*   RDW 49.3   PLATELETCT 386   MPV 9.2     Recent Labs     10/28/21  0405   SODIUM 137   POTASSIUM 3.7   CHLORIDE 102   CO2 24   GLUCOSE 107*   BUN 7*   CREATININE 0.46*   CALCIUM 9.1                   Imaging  DX-CHEST-LIMITED (1 VIEW)   Final Result      1.  Nodular opacity in the left lower lobe, possibly representing early pneumonia or atelectasis. Follow-up imaging is recommended to document resolution.      2.  Thickening of the right minor fissure, possibly related to scarring or atelectasis.      3.  Increased nodular densities projecting over the right lower lung, possibly external to the patient. CT of the chest may be helpful for further evaluation.      CT-HEAD W/O   Final Result      1.  Cerebral atrophy.      2.  White matter lucencies most consistent with small vessel ischemic change versus demyelination or gliosis.      3.  Otherwise, Head CT without contrast with no acute findings. No evidence of acute cerebral infarction, hemorrhage or mass lesion.      DX-SHOULDER 2+ RIGHT   Final Result         Acute comminuted and mildly displaced fracture of the distal clavicle. No involvement of the AC joint.           Assessment/Plan  Advance care planning  Assessment & Plan   I discussed the case with the patient's sister/patient Daughter over the phone .  I discussed advance care planning  For at least 35 minutes over the phone as patient daughter/sister could not be available face-to-face.,  I discussed with them including diagnosis, prognosis, plan of care, risks and benefits of any therapies that could be offered, as well as alternatives including palliation and hospice, as appropriate.  I placed a hospice referral  Changed CODE STATUS DNI/DN AR      Infection of lumbar spine (HCC)- (present on admission)  Assessment & Plan  She has wound vac in place  Follows with Dr. Canales  No other records  available     Closed nondisplaced fracture of acromial end of right clavicle- (present on admission)  Assessment & Plan  Patient was transferred with a sling, will continue for support.  Pain control.  PT/OT  Right upper extremity neurovascularly intact, range of motion intact  No surgery per ortho  Needs to follow up as OP     Encephalopathy- (present on admission)  Assessment & Plan  In setting of UTI and sepsis verss  --Patient mentation has not been cleared, MRI of the brain that was obtained at [place] pending   [facility]    Chronic pain syndrome- (present on admission)  Assessment & Plan  will hold opoid     Acquired hypothyroidism- (present on admission)  Assessment & Plan  Continue Synthroid 50 mcg daily    Paroxysmal atrial fibrillation (HCC)- (present on admission)  Assessment & Plan  Rate well controlled on metoprolol tartrate 25 mg p.o. twice daily, continue Xarelto for secondary hypercoagulable state    Sepsis (HCC)- (present on admission)  Assessment & Plan  This is Sepsis Present on admission  SIRS criteria identified on my evaluation include: Tachycardia, with heart rate greater than 90 BPM, Tachypnea, with respirations greater than 20 per minute and Leukocytosis, with WBC greater than 12,000  Source is Urinary  Sepsis protocol initiated  Fluid resuscitation ordered per protocol  IV antibiotics as appropriate for source of sepsis  While organ dysfunction may be noted elsewhere in this problem list or in the chart, degree of organ dysfunction does not meet CMS criteria for severe sepsis  She was started on ceftriaxone, she received fosphomycin x 1    COPD (chronic obstructive pulmonary disease) ([facility])- (present on admission)  Assessment & Plan  Not in acute exacerbation.  DuoNebs as needed.      Non-small cell lung cancer (NSCLC) ([facility])- (present on admission)  Assessment & Plan  Follows with radiation oncology Dr. Fairbanks at [facility]   Case has been  discussed with   [facility] for radiation oncology.     ---Patient was evaluated by medical oncology on 5/2021, stated and not a candidate for chemotherapy due to patient performance status.  After discussing extensively with radiation oncology sister that patient is a candidate for hospice.  I discussed the case with patient's daughter/sister, hospice referral has been placed  CM updated, she has been accepted to hospice    Also, her daughter/sister stated that they cant take care of her full time; They stated that she will need to go to group home for hospice   Case management reaching out to leadership           I have performed a physical exam and reviewed and updated ROS and Plan today (10/29/2021). In review of yesterday's note (10/28/2021), there are no changes except as documented above.   VTE prophylaxis: therapeutic anticoagulation with xarelto

## 2021-10-29 NOTE — THERAPY
Missed Therapy     Patient Name: Idalia Freitas  Age:  81 y.o., Sex:  female  Medical Record #: [id number]  Today's Date: 10/29/2021    Discussed missed therapy with RN. OT attempted to see pt for OT treatment.  Pt highly confused per nursing and wound care team. Pt in with wound care who is changing wound vac in pt's back. Pt on pain meds.  Pt not available to be seen . Will attempt to see pt for OT treatment later this afternoon as able /time permitting. RN informed and agreed.        10/29/21 1550   Cognition    Cognition / Consciousness X   Speech/ Communication Delayed Responses   Level of Consciousness   (highly confused per RN and wound team )   Anticipated Discharge Equipment and Recommendations   DC Equipment Recommendations Unable to determine at this time   Discharge Recommendations Recommend post-acute placement for additional occupational therapy services prior to discharge home   Interdisciplinary Plan of Care Collaboration   IDT Collaboration with  Nursing;Other (See Comments)  (wound team )   Collaboration Comments Attempted to see pt for OT tx. Pt getting wound vac in back changed, on meds. Pt not able to participate at this time.    Session Information   Date / Session Number  Attempted 10/29. Pt in procedure not available. Last seen 10/24 #1 (1/3, 10/30)

## 2021-10-29 NOTE — RESPIRATORY CARE
Seen pt at 0141 due to PRN tx for sob however, pt is currently sleeping no distress noted at this time. Will continue to monitor.

## 2021-10-29 NOTE — PROGRESS NOTES
Patient alert to person and place, confused to time and situation. On room air satting 93% at rest. Denies pain. Sitting up in bed eating breakfast. VSS. Wound vac in tact to spine. OOB to commode with max assist. Using call bell approp, bed alarm on

## 2021-10-29 NOTE — CARE PLAN
The patient is Stable - Low risk of patient condition declining or worsening    Shift Goals  Clinical Goals: safety, reorient patient  Patient Goals: comfort    Progress made toward(s) clinical / shift goals:  pending hospice acceptance    Patient is not progressing towards the following goals: OOB for meals and to commode       Problem: Knowledge Deficit - Standard  Goal: Patient and family/care givers will demonstrate understanding of plan of care, disease process/condition, diagnostic tests and medications  Outcome: Progressing     Problem: Pain - Standard  Goal: Alleviation of pain or a reduction in pain to the patient’s comfort goal  Outcome: Progressing     Problem: Hemodynamics  Goal: Patient's hemodynamics, fluid balance and neurologic status will be stable or improve  Outcome: Progressing     Problem: Fluid Volume  Goal: Fluid volume balance will be maintained  Outcome: Progressing     Problem: Urinary - Renal Perfusion  Goal: Ability to achieve and maintain adequate renal perfusion and functioning will improve  Outcome: Progressing     Problem: Respiratory  Goal: Patient will achieve/maintain optimum respiratory ventilation and gas exchange  Outcome: Progressing

## 2021-10-30 LAB
ALBUMIN SERPL BCP-MCNC: 2.8 G/DL (ref 3.2–4.9)
BASOPHILS # BLD AUTO: 1.7 % (ref 0–1.8)
BASOPHILS # BLD: 0.11 K/UL (ref 0–0.12)
BUN SERPL-MCNC: 9 MG/DL (ref 8–22)
CALCIUM SERPL-MCNC: 9.5 MG/DL (ref 8.5–10.5)
CHLORIDE SERPL-SCNC: 104 MMOL/L (ref 96–112)
CO2 SERPL-SCNC: 26 MMOL/L (ref 20–33)
CREAT SERPL-MCNC: 0.53 MG/DL (ref 0.5–1.4)
EOSINOPHIL # BLD AUTO: 0.46 K/UL (ref 0–0.51)
EOSINOPHIL NFR BLD: 7 % (ref 0–6.9)
ERYTHROCYTE [DISTWIDTH] IN BLOOD BY AUTOMATED COUNT: 50.9 FL (ref 35.9–50)
GLUCOSE SERPL-MCNC: 100 MG/DL (ref 65–99)
HCT VFR BLD AUTO: 35.6 % (ref 37–47)
HGB BLD-MCNC: 10.9 G/DL (ref 12–16)
IMM GRANULOCYTES # BLD AUTO: 0.11 K/UL (ref 0–0.11)
IMM GRANULOCYTES NFR BLD AUTO: 1.7 % (ref 0–0.9)
LYMPHOCYTES # BLD AUTO: 1.46 K/UL (ref 1–4.8)
LYMPHOCYTES NFR BLD: 22.3 % (ref 22–41)
MCH RBC QN AUTO: 27.9 PG (ref 27–33)
MCHC RBC AUTO-ENTMCNC: 30.6 G/DL (ref 33.6–35)
MCV RBC AUTO: 91.3 FL (ref 81.4–97.8)
MONOCYTES # BLD AUTO: 0.76 K/UL (ref 0–0.85)
MONOCYTES NFR BLD AUTO: 11.6 % (ref 0–13.4)
NEUTROPHILS # BLD AUTO: 3.64 K/UL (ref 2–7.15)
NEUTROPHILS NFR BLD: 55.7 % (ref 44–72)
NRBC # BLD AUTO: 0 K/UL
NRBC BLD-RTO: 0 /100 WBC
PHOSPHATE SERPL-MCNC: 2.7 MG/DL (ref 2.5–4.5)
PLATELET # BLD AUTO: 349 K/UL (ref 164–446)
PMV BLD AUTO: 9.5 FL (ref 9–12.9)
POTASSIUM SERPL-SCNC: 3.4 MMOL/L (ref 3.6–5.5)
RBC # BLD AUTO: 3.9 M/UL (ref 4.2–5.4)
SODIUM SERPL-SCNC: 139 MMOL/L (ref 135–145)
WBC # BLD AUTO: 6.5 K/UL (ref 4.8–10.8)

## 2021-10-30 PROCEDURE — 99232 SBSQ HOSP IP/OBS MODERATE 35: CPT | Performed by: HOSPITALIST

## 2021-10-30 PROCEDURE — 770001 HCHG ROOM/CARE - MED/SURG/GYN PRIV*

## 2021-10-30 PROCEDURE — 700111 HCHG RX REV CODE 636 W/ 250 OVERRIDE (IP): Performed by: STUDENT IN AN ORGANIZED HEALTH CARE EDUCATION/TRAINING PROGRAM

## 2021-10-30 PROCEDURE — A9270 NON-COVERED ITEM OR SERVICE: HCPCS | Performed by: HOSPITALIST

## 2021-10-30 PROCEDURE — 80069 RENAL FUNCTION PANEL: CPT

## 2021-10-30 PROCEDURE — A9270 NON-COVERED ITEM OR SERVICE: HCPCS | Performed by: INTERNAL MEDICINE

## 2021-10-30 PROCEDURE — 700102 HCHG RX REV CODE 250 W/ 637 OVERRIDE(OP): Performed by: STUDENT IN AN ORGANIZED HEALTH CARE EDUCATION/TRAINING PROGRAM

## 2021-10-30 PROCEDURE — 700102 HCHG RX REV CODE 250 W/ 637 OVERRIDE(OP): Performed by: INTERNAL MEDICINE

## 2021-10-30 PROCEDURE — 36415 COLL VENOUS BLD VENIPUNCTURE: CPT

## 2021-10-30 PROCEDURE — A9270 NON-COVERED ITEM OR SERVICE: HCPCS | Performed by: STUDENT IN AN ORGANIZED HEALTH CARE EDUCATION/TRAINING PROGRAM

## 2021-10-30 PROCEDURE — 700102 HCHG RX REV CODE 250 W/ 637 OVERRIDE(OP): Performed by: HOSPITALIST

## 2021-10-30 PROCEDURE — 97605 NEG PRS WND THER DME<=50SQCM: CPT

## 2021-10-30 PROCEDURE — 85025 COMPLETE CBC W/AUTO DIFF WBC: CPT

## 2021-10-30 RX ORDER — POTASSIUM CHLORIDE 20 MEQ/1
40 TABLET, EXTENDED RELEASE ORAL ONCE
Status: ACTIVE | OUTPATIENT
Start: 2021-10-30 | End: 2021-10-31

## 2021-10-30 RX ADMIN — METOPROLOL TARTRATE 25 MG: 25 TABLET, FILM COATED ORAL at 05:41

## 2021-10-30 RX ADMIN — OXYCODONE 5 MG: 5 TABLET ORAL at 10:16

## 2021-10-30 RX ADMIN — HYDROMORPHONE HYDROCHLORIDE 0.5 MG: 1 INJECTION, SOLUTION INTRAMUSCULAR; INTRAVENOUS; SUBCUTANEOUS at 19:40

## 2021-10-30 RX ADMIN — LEVOTHYROXINE SODIUM 50 MCG: 0.05 TABLET ORAL at 05:42

## 2021-10-30 RX ADMIN — OXYCODONE HYDROCHLORIDE 10 MG: 10 TABLET ORAL at 13:25

## 2021-10-30 RX ADMIN — HYDROMORPHONE HYDROCHLORIDE 0.5 MG: 1 INJECTION, SOLUTION INTRAMUSCULAR; INTRAVENOUS; SUBCUTANEOUS at 20:02

## 2021-10-30 ASSESSMENT — PAIN DESCRIPTION - PAIN TYPE
TYPE: ACUTE PAIN
TYPE: ACUTE PAIN;SURGICAL PAIN
TYPE: ACUTE PAIN
TYPE: ACUTE PAIN

## 2021-10-30 NOTE — WOUND TEAM
[facility]  Inpatient Services  Wound and Skin Care Progress Note    Admission Date: 10/24/2021     Last order of IP CONSULT TO WOUND CARE was found on 10/24/2021 from Hospital Encounter on 10/24/2021     HPI, PMH, SH: Reviewed    Past Surgical History:   Procedure Laterality Date   • WIDE EXCISION  8/7/2009    Performed by AMADO HALL at SUR[facility]ERY [facility] ORS   • WIDE EXCISION  9/18/08    Performed by GERRY CASTILLO at SURGERY [facility] ORS   • FLAP CLOSURE  9/18/08    Performed by GERRY CASTILLO at SUR[facility]ERY [facility] ORS   • MASS EXCISION GENERAL  2008    left upper arm  (cancerous )   • CHOLECYSTECTOMY  2000    laparoscopically    • BIOPSY BREAST  1990    right breast biopsy  (benign)   • FOOT SURGERY  1990    mass removed from right foot   • HYSTERECTOMY RADICAL  1974    abdominal      Social History     Tobacco Use   • Smoking status: Former Smoker     Types: Cigarettes   • Smokeless tobacco: Never Used   • Tobacco comment: Quit in 1989   Substance Use Topics   • Alcohol use: No     No chief complaint on file.    Diagnosis: Sepsis (HCC) [A41.9]    Unit where seen by Wound Team: T312/02     WOUND CONSULT/FOLLOW UP RELATED TO:  Sacrum, right heel and mid-back NPWT     WOUND HISTORY:  Patient was unable to provide history on back.  Patient from outside facility    WOUND ASSESSMENT/LDA        Negative Pressure Wound Therapy Back Mid (Active)   NPWT Pump Mode / Pressure Setting Ulta;Continuous;125 mmHg 10/29/21 1945   Dressing Type Small;Black Foam (Regular) 10/29/21 0800   Number of Foam Pieces Used 2 10/27/21 1200   Canister Changed No 10/29/21 1945   NEXT Dressing Change/Treatment Date 10/29/21 10/27/21 2105   WOUND NURSE ONLY - Time Spent with Patient (mins) 45 10/25/21 1100           Wound 10/25/21 Full Thickness Wound Back Mid (Active)   Wound Image   10/29/21 1554   Site Assessment Dull pink 10/29/21 1945   Periwound Assessment scar 10/29/21 1945   Margins  10/29/21 1945   Closure   10/29/21 1945   Drainage Amount Small 10/29/21 1500   Drainage Description Serosanguineous 10/29/21 1500   Treatments Cleansed;Site care 10/27/21 1200   Wound Cleansing Approved Wound Cleanser 10/27/21 1200   Periwound Protectant Skin Protectant Wipes to Periwound;Paste Ring;Drape 10/27/21 1200   Dressing Cleansing/Solutions Not Applicable 10/27/21 1200   Dressing Options Wound Vac 10/29/21 1945   Dressing Changed Observed 10/29/21 1945   Dressing Status Clean;Dry;Intact 10/29/21 1945   Dressing Change/Treatment Frequency Monday, Wednesday, Friday, and As Needed 10/29/21 1945   NEXT Dressing Change/Treatment Date 11/01/21 10/29/21 1500   NEXT Weekly Photo (Inpatient Only) 11/01/21 10/29/21 1500   Non-staged Wound Description Full thickness 10/29/21 1500   Wound Length (cm) 7 cm 10/25/21 1100   Wound Width (cm) 2.4 cm 10/25/21 1100   Wound Depth (cm) 0.6 cm 10/25/21 1100   Wound Surface Area (cm^2) 16.8 cm^2 10/25/21 1100   Wound Volume (cm^3) 10.08 cm^3 10/25/21 1100   Shape Oval  10/27/21 1200   Wound Odor None 10/29/21 1500   Exposed Structures None 10/29/21 1500   WOUND NURSE ONLY - Time Spent with Patient (mins) 45 10/29/21 1500          Vascular:    GOPAL:   No results found.    Lab Values:    Lab Results   Component Value Date/Time    WBC 6.5 10/30/2021 06:04 AM    RBC 3.90 (L) 10/30/2021 06:04 AM    HEMOGLOBIN 10.9 (L) 10/30/2021 06:04 AM    HEMATOCRIT 35.6 (L) 10/30/2021 06:04 AM        Culture Results show:  No results found for this or any previous visit (from the past 720 hour(s)).    Pain Level/Medicated:  Pre-medicated       INTERVENTIONS BY WOUND TEAM:  Performed standard wound care which includes appropriate positioning, dressing removal and non-selective debridement. Pictures and measurements obtained weekly if/when required.  Preparation for Dressing removal: Dressing soaked with wound cleanser   Non-selectively Debrided with:  wound cleanser and gauze.  Sharp debridement: NA  Michaela wound: Cleansed  with wound cleanser and gauze, Prepped with no sting skin prep and paste ring  Primary Dressing: ramon activated by saline 1 black foam to wound bed bridged to R lateral gluteal area over soft tissue avoiding bony areas of the pelvis and sealed with drape.  Secondary (Outer) Dressing:     Interdisciplinary consultation: Patient, Bedside RN     EVALUATION / RATIONALE FOR TREATMENT:  Most Recent Date:  10/29/21 granular tissue is dull pink with irregular surface.  Added collagen to enhance cellular activity.    10/27/21: Mid back wound with persistent hypergranulation although a little less than last treatment. Patient was having significant pain, but denied it in her wound and mostly when moving her. Did not assess sacrum today. Dressing CDI.   10/25/21: mid-back wound has hypergranulation, silver nitrate applied to hypergranulation to help possibly flatten down the growth.  Possibly need provider debridement to remove all hypergranulation.  NPWT continued to help with drainage.  Sacrum is a POA DTI, offloaded with sacral mepilex. Right heel is callus and dry skin, no wounds.      Goals: Steady decrease in wound area and depth weekly.    WOUND TEAM PLAN OF CARE ([X] for frequency of wound follow up,):   Nursing to follow orders written for wound care. Contact wound team if area fails to progress, deteriorates or with any questions/concerns  Dressing changes by wound team:                   Follow up 3 times weekly:                NPWT change 3 times weekly:  X,  Mid-back  Follow up 1-2 times weekly:      Follow up Bi-Monthly:                   Follow up as needed:   sacrum  Other (explain):     NURSING PLAN OF CARE ORDERS (X):  Dressing changes: See Dressing Care orders: x  Skin care: See Skin Care orders:   RN Prevention Protocol: x  Rectal tube care: See Rectal Tube Care orders:   Other orders:    RSKIN:   CURRENTLY IN PLACE (X), APPLIED THIS VISIT (A), ORDERED (O):   Q shift Tobias:  X  Q shift pressure point  assessments:  X    Surface/Positioning   Pressure redistribution mattress   x       Low Airloss          Bariatric foam      Bariatric REGINO     Waffle cushion        Waffle Overlay      O    Reposition q 2 hours    x  TAPs Turning system     Z Rylan Pillow     Offloading/Redistribution   Sacral Mepilex (Silicone dressing)   a  Heel Mepilex (Silicone dressing)      a   Heel float boots (Prevalon boot)             Float Heels off Bed with Pillows           Respiratory NA  Silicone O2 tubing         Gray Foam Ear protectors     Cannula fixation Device (Tender )          High flow offloading Clip    Elastic head band offloading device      Anchorfast                                                         Trach with Optifoam split foam             Containment/Moisture Prevention     Rectal tube or BMS    Purwick/Condom Cath    x    Moreira Catheter    Barrier wipes           Barrier paste       Antifungal tx      Interdry        Mobilization       Up to chair        Ambulate      PT/OT  x    Nutrition       Dietician        Diabetes Education      PO x    TF     TPN     NPO   # days     Other        Anticipated discharge plans:   LTACH:        SNF/Rehab:  x                Home Health Care:           Outpatient Wound Center:            Self/Family Care:        Other:                  Vac Discharge Needs:   Not Applicable Pt not on a wound vac:       Regular Vac while inpatient, alternative dressing at DC:        Regular Vac in use and continued at DC:          x  Reg. Vac w/ Skin Sub/Biologic in use. Will need to be changed 2x wkly:      Veraflo Vac while inpatient, ok to transition to Regular Vac on Discharge:           Veraflo Vac while inpatient, will need to remain on Veraflo Vac upon discharge:

## 2021-10-30 NOTE — PROGRESS NOTES
"Patient pulled out IV, \"dont hurt me again I dont want another one.\" tearful and crying, emotional support given   "

## 2021-10-30 NOTE — PROGRESS NOTES
Hospital Medicine Daily Progress Note    Date of Service  10/30/2021    Chief Complaint  Idalia HALE is a 81 y.o. female admitted 10/24/2021 with sepsis, ground-level fall, right acromial distal fracture, UTI    Hospital Course  This is a 81 -year-old female with a past medical significant for COPD not on home O2 , urinary incontinence, diabetes mellitus, prior VT, hypertension  A. fib, secondary hypercoagulable state, not on anticoagulation, lung cancer diagnosed in July 2021, chronic back wound with wound VAC in place, followed by Dr. Canales and wound care team was transferred from [facility] on 10/24/2021.    Patient reports having a ground-level fall, leading to distal clavicle fracture hypokalemia resume 2.6, septic with elevated WBC 11.9, tachycardic with heart rate of 116.    Unfortunately blood cultures were not obtained during the admission secondary to urinary tract infection.  Urine culture was obtained, patient was started on ceftriaxone.  Regarding her distal clavicle fracture, orthopedic surgery  Dr Whalen was consulted, recommended sling for comfort, pain control, recommended following up with repeat x-ray in 2 weeks.    Patient noted that patient was encephalopathic thought to be secondary to urinary tract infection.  Upon my evaluation today, she is noted to be alert and oriented 2-3; not able to answer complex questions.  PT/OT has evaluated the patient and recommend postacute.  Patient needs maximum assist at this time.  She is not safe to go home.    I called patient sister, she does for Dr. Chen of radiation oncology at [place], she has an MRI of the brain obtained 10/20/2021, requesting records.  Patient sister stated that he has been hospitalized at [place] and received 4 to 6 weeks of IV antibiotic for her back wound.  Wound care has been consulted during the stay in the hospital.  Blood culture has been ordered    Interval Problem Update  10/26:  No  acute events overnight, patient is alert and oriented x2-3.  Vital signs stable, currently saturating well on room air.  Blood culture has been ordered, urine culture growing greater than 100,000 ESBL E. coli, will provide fosfomycin  --Noted to be anemic with hemoglobin of 11.5, will transfuse if less than 7  PT/OT evaluated, recommend postacute.  Patient does not wanted to go to skilled nursing facility as he is max assist    Patient is alert and oriented x1-2, her mentation fluctuating, cognitive evaluation ordered.  Patient does not have POA for medical.    10/27:  --Patient continued to be confused elated x2.  I had an extensive discussion with the patient oldest daughter and patient sister, CODE STATUS has been changed to DNI/DN AR.  After next discussion with the patient sister/daughter, hospice referral has been placed.  --I also discussed the case with Dr. Fairbanks of radiation oncology, it is noted that patient has stage III to stage IV lung cancer which has been progressed since May.  Patient had not received any chemotherapy/radiation therapy.  Dr. Hoyt of oncology has evaluated the patient on May 2021 who stated that because of the patient's poor performance status she is not a candidate of chemotherapy.    --I discussed with radiation oncology who stated that because of the patient worsening mental status.  Patient is a candidate of hospice option extensive discussion of the patient/discussed-over the phone, decision was made to have a hospice referral.    10/28:  --No acute events overnight, laying in bed, denies any complaint, she continues to remain confused  --Hospice referral has been sent  Patient is medically stable to be discharged to home on home hospice    10/29:  --No acute events overnight, vital signs stable.  I called the patient's sister, who wanted her to be comfortable.  --She does have a wound in her back  She has been accepted by hospice and significant need for chemotherapy  considering patient poor performance status as per Medical oncology in 5/2021 and radiation oncology.  Patient cancer continue to grow during this.  Since diagnosis in May 2021.  --She is very frail debilitated    10/30:  --No acute events overnight, patient is alert and oriented x1-2.  Vital signs stable, provide boost 3 times daily.  Patient had an x-ray by hospice  -- She needs to be discharged on group home on home hospice    Medically cleared to be discharged to group home on Hospice    Consultants/Specialty  orthopedics    Code Status  DNAR/DNI    Disposition  Patient is  Medically cleared to be discharged to group home on hospice    Anticipate discharge to to be determine .  I have placed the appropriate orders for post-discharge needs.    Review of Systems  Limited secondary to patient pain mentation    Physical Exam  Temp:  [36.3 °C (97.3 °F)-36.9 °C (98.4 °F)] 36.4 °C (97.5 °F)  Pulse:  [] 98  Resp:  [16-18] 16  BP: ()/(56-66) 110/60  SpO2:  [88 %-90 %] 88 %    Physical Exam  Vitals and nursing note reviewed.   Constitutional:       Appearance: She is ill-appearing.      Comments:  Frail and awake    HENT:      Head: Normocephalic and atraumatic.   Eyes:      Extraocular Movements: Extraocular movements intact.   Cardiovascular:      Rate and Rhythm: Normal rate.      Heart sounds: Murmur heard.     Pulmonary:      Effort: Pulmonary effort is normal. No respiratory distress.      Breath sounds: No wheezing.   Abdominal:      General: There is no distension.      Palpations: Abdomen is soft.      Tenderness: There is no abdominal tenderness.      Comments: Noted to have wound VAC in her back   Musculoskeletal:      Cervical back: Neck supple.      Comments: Wound vac on her back    Skin:     General: Skin is dry.      Coloration: Skin is not jaundiced.   Neurological:      Mental Status: She is alert.      Motor: No weakness.      Comments: Knows her name     Psychiatric:         Mood and  Affect: Mood normal.         Fluids    Intake/Output Summary (Last 24 hours) at 10/30/2021 1606  Last data filed at 10/30/2021 1016  Gross per 24 hour   Intake 120 ml   Output --   Net 120 ml       Laboratory  Recent Labs     10/28/21  0405 10/30/21  0604   WBC 7.2 6.5   RBC 4.07* 3.90*   HEMOGLOBIN 11.4* 10.9*   HEMATOCRIT 35.9* 35.6*   MCV 88.2 91.3   MCH 28.0 27.9   MCHC 31.8* 30.6*   RDW 49.3 50.9*   PLATELETCT 386 349   MPV 9.2 9.5     Recent Labs     10/28/21  0405 10/30/21  0604   SODIUM 137 139   POTASSIUM 3.7 3.4*   CHLORIDE 102 104   CO2 24 26   GLUCOSE 107* 100*   BUN 7* 9   CREATININE 0.46* 0.53   CALCIUM 9.1 9.5                   Imaging  DX-CHEST-LIMITED (1 VIEW)   Final Result      1.  Nodular opacity in the left lower lobe, possibly representing early pneumonia or atelectasis. Follow-up imaging is recommended to document resolution.      2.  Thickening of the right minor fissure, possibly related to scarring or atelectasis.      3.  Increased nodular densities projecting over the right lower lung, possibly external to the patient. CT of the chest may be helpful for further evaluation.      CT-HEAD W/O   Final Result      1.  Cerebral atrophy.      2.  White matter lucencies most consistent with small vessel ischemic change versus demyelination or gliosis.      3.  Otherwise, Head CT without contrast with no acute findings. No evidence of acute cerebral infarction, hemorrhage or mass lesion.      DX-SHOULDER 2+ RIGHT   Final Result         Acute comminuted and mildly displaced fracture of the distal clavicle. No involvement of the AC joint.           Assessment/Plan  Advance care planning  Assessment & Plan   I discussed the case with the patient's sister/patient Daughter over the phone .  I discussed advance care planning  For at least 35 minutes over the phone as patient daughter/sister could not be available face-to-face.,  I discussed with them including diagnosis, prognosis, plan of care, risks  and benefits of any therapies that could be offered, as well as alternatives including palliation and hospice, as appropriate.  I placed a hospice referral  Changed CODE STATUS DNI/DN AR      Infection of lumbar spine (HCC)- (present on admission)  Assessment & Plan  She has wound vac in place  Follows with Dr. Canales  No other records available     Closed nondisplaced fracture of acromial end of right clavicle- (present on admission)  Assessment & Plan  Patient was transferred with a sling, will continue for support.  Pain control.  PT/OT  Right upper extremity neurovascularly intact, range of motion intact  No surgery per ortho  Needs to follow up as OP     Encephalopathy- (present on admission)  Assessment & Plan  In setting of UTI and sepsis verss  --Patient mentation has not been cleared, MRI of the brain that was obtained at [facility] pending   [facility]    Chronic pain syndrome- (present on admission)  Assessment & Plan  will hold opoid     Acquired hypothyroidism- (present on admission)  Assessment & Plan  Continue Synthroid 50 mcg daily    Paroxysmal atrial fibrillation (HCC)- (present on admission)  Assessment & Plan  Rate well controlled on metoprolol tartrate 25 mg p.o. twice daily, continue Xarelto for secondary hypercoagulable state    Sepsis (HCC)- (present on admission)  Assessment & Plan  This is Sepsis Present on admission  SIRS criteria identified on my evaluation include: Tachycardia, with heart rate greater than 90 BPM, Tachypnea, with respirations greater than 20 per minute and Leukocytosis, with WBC greater than 12,000  Source is Urinary  Sepsis protocol initiated  Fluid resuscitation ordered per protocol  IV antibiotics as appropriate for source of sepsis  While organ dysfunction may be noted elsewhere in this problem list or in the chart, degree of organ dysfunction does not meet CMS criteria for severe sepsis  She was started on ceftriaxone, she received  fosphomycin x 1    COPD (chronic obstructive pulmonary disease) (HCC)- (present on admission)  Assessment & Plan  Not in acute exacerbation.  DuoNebs as needed.      Non-small cell lung cancer (NSCLC) (HCC)- (present on admission)  Assessment & Plan  Follows with radiation oncology Dr. Fairbanks at [facility]   Case has been  discussed with Dr. Fairbanks for radiation oncology.     ---Patient was evaluated by medical oncology on 5/2021, stated and not a candidate for chemotherapy due to patient performance status.  After discussing extensively with radiation oncology sister that patient is a candidate for hospice.  I discussed the case with patient's daughter/sister, hospice referral has been placed  CM updated, she has been accepted to hospice    Also, her daughter/sister stated that they cant take care of her full time; They stated that she will need to go to group home for hospice   Case management reaching out to leadership           I have performed a physical exam and reviewed and updated ROS and Plan today (10/30/2021). In review of yesterday's note (10/29/2021), there are no changes except as documented above.     VTE prophylaxis: therapeutic anticoagulation with xarelto

## 2021-10-30 NOTE — CARE PLAN
The patient is Stable - Low risk of patient condition declining or worsening    Shift Goals  Clinical Goals: Safety, wound vac care  Patient Goals: go home    Progress made toward(s) clinical / shift goals:    Plan of care reviewed with pt, reinforcement needed. Pt restless at beginning of the shift, comforted and reassured as needed. Pt reoriented as needed. Wound vac in place and operating properly.   Problem: Knowledge Deficit - Standard  Goal: Patient and family/care givers will demonstrate understanding of plan of care, disease process/condition, diagnostic tests and medications  Outcome: Progressing     Problem: Pain - Standard  Goal: Alleviation of pain or a reduction in pain to the patient’s comfort goal  Outcome: Progressing     Problem: Fall Risk  Goal: Patient will remain free from falls  Outcome: Progressing

## 2021-10-31 LAB
BACTERIA BLD CULT: NORMAL
BACTERIA BLD CULT: NORMAL
SIGNIFICANT IND 70042: NORMAL
SIGNIFICANT IND 70042: NORMAL
SITE SITE: NORMAL
SITE SITE: NORMAL
SOURCE SOURCE: NORMAL
SOURCE SOURCE: NORMAL

## 2021-10-31 PROCEDURE — 770001 HCHG ROOM/CARE - MED/SURG/GYN PRIV*

## 2021-10-31 PROCEDURE — 51798 US URINE CAPACITY MEASURE: CPT

## 2021-10-31 PROCEDURE — 700111 HCHG RX REV CODE 636 W/ 250 OVERRIDE (IP): Performed by: STUDENT IN AN ORGANIZED HEALTH CARE EDUCATION/TRAINING PROGRAM

## 2021-10-31 PROCEDURE — 700102 HCHG RX REV CODE 250 W/ 637 OVERRIDE(OP): Performed by: STUDENT IN AN ORGANIZED HEALTH CARE EDUCATION/TRAINING PROGRAM

## 2021-10-31 PROCEDURE — 99232 SBSQ HOSP IP/OBS MODERATE 35: CPT | Performed by: HOSPITALIST

## 2021-10-31 PROCEDURE — A9270 NON-COVERED ITEM OR SERVICE: HCPCS | Performed by: STUDENT IN AN ORGANIZED HEALTH CARE EDUCATION/TRAINING PROGRAM

## 2021-10-31 PROCEDURE — 700102 HCHG RX REV CODE 250 W/ 637 OVERRIDE(OP): Performed by: INTERNAL MEDICINE

## 2021-10-31 PROCEDURE — A9270 NON-COVERED ITEM OR SERVICE: HCPCS | Performed by: INTERNAL MEDICINE

## 2021-10-31 PROCEDURE — 700105 HCHG RX REV CODE 258: Performed by: HOSPITALIST

## 2021-10-31 RX ORDER — SODIUM CHLORIDE 9 MG/ML
INJECTION, SOLUTION INTRAVENOUS CONTINUOUS
Status: ACTIVE | OUTPATIENT
Start: 2021-10-31 | End: 2021-10-31

## 2021-10-31 RX ADMIN — HYDROMORPHONE HYDROCHLORIDE 0.5 MG: 1 INJECTION, SOLUTION INTRAMUSCULAR; INTRAVENOUS; SUBCUTANEOUS at 17:01

## 2021-10-31 RX ADMIN — HYDROMORPHONE HYDROCHLORIDE 0.5 MG: 1 INJECTION, SOLUTION INTRAMUSCULAR; INTRAVENOUS; SUBCUTANEOUS at 02:39

## 2021-10-31 RX ADMIN — OXYCODONE 5 MG: 5 TABLET ORAL at 22:33

## 2021-10-31 RX ADMIN — SODIUM CHLORIDE: 9 INJECTION, SOLUTION INTRAVENOUS at 14:32

## 2021-10-31 RX ADMIN — RIVAROXABAN 20 MG: 20 TABLET, FILM COATED ORAL at 21:57

## 2021-10-31 RX ADMIN — HYDROMORPHONE HYDROCHLORIDE 0.5 MG: 1 INJECTION, SOLUTION INTRAMUSCULAR; INTRAVENOUS; SUBCUTANEOUS at 09:57

## 2021-10-31 ASSESSMENT — PAIN DESCRIPTION - PAIN TYPE
TYPE: ACUTE PAIN

## 2021-10-31 NOTE — PROGRESS NOTES
Hospital Medicine Daily Progress Note    Date of Service  10/31/2021    Chief Complaint  Idalia HALE is a 81 y.o. female admitted 10/24/2021 with sepsis, ground-level fall, right acromial distal fracture, UTI    Hospital Course  This is a 81 -year-old female with a past medical significant for COPD not on home O2 , urinary incontinence, diabetes mellitus, prior VT, hypertension  A. fib, secondary hypercoagulable state, not on anticoagulation, lung cancer diagnosed in July 2021, chronic back wound with wound VAC in place, followed by Dr. Canales and wound care team was transferred from [facility] on 10/24/2021.    Patient reports having a ground-level fall, leading to distal clavicle fracture hypokalemia resume 2.6, septic with elevated WBC 11.9, tachycardic with heart rate of 116.    Unfortunately blood cultures were not obtained during the admission secondary to urinary tract infection.  Urine culture was obtained, patient was started on ceftriaxone.  Regarding her distal clavicle fracture, orthopedic surgery  Dr Whalen was consulted, recommended sling for comfort, pain control, recommended following up with repeat x-ray in 2 weeks.    Patient noted that patient was encephalopathic thought to be secondary to urinary tract infection.  Upon my evaluation today, she is noted to be alert and oriented 2-3; not able to answer complex questions.  PT/OT has evaluated the patient and recommend postacute.  Patient needs maximum assist at this time.  She is not safe to go home.    I called patient sister, she does for Dr. Chen of radiation oncology at [place], she has an MRI of the brain obtained 10/20/2021, requesting records.  Patient sister stated that he has been hospitalized at [place] and received 4 to 6 weeks of IV antibiotic for her back wound.  Wound care has been consulted during the stay in the hospital.  Blood culture has been ordered    Interval Problem Update  10/26:  No  acute events overnight, patient is alert and oriented x2-3.  Vital signs stable, currently saturating well on room air.  Blood culture has been ordered, urine culture growing greater than 100,000 ESBL E. coli, will provide fosfomycin  --Noted to be anemic with hemoglobin of 11.5, will transfuse if less than 7  PT/OT evaluated, recommend postacute.  Patient does not wanted to go to skilled nursing facility as he is max assist    Patient is alert and oriented x1-2, her mentation fluctuating, cognitive evaluation ordered.  Patient does not have POA for medical.    10/27:  --Patient continued to be confused elated x2.  I had an extensive discussion with the patient oldest daughter and patient sister, CODE STATUS has been changed to DNI/DN AR.  After next discussion with the patient sister/daughter, hospice referral has been placed.  --I also discussed the case with Dr. Fairbanks of radiation oncology, it is noted that patient has stage III to stage IV lung cancer which has been progressed since May.  Patient had not received any chemotherapy/radiation therapy.  Dr. Hoyt of oncology has evaluated the patient on May 2021 who stated that because of the patient's poor performance status she is not a candidate of chemotherapy.    --I discussed with radiation oncology who stated that because of the patient worsening mental status.  Patient is a candidate of hospice option extensive discussion of the patient/discussed-over the phone, decision was made to have a hospice referral.    10/28:  --No acute events overnight, laying in bed, denies any complaint, she continues to remain confused  --Hospice referral has been sent  Patient is medically stable to be discharged to home on home hospice    10/29:  --No acute events overnight, vital signs stable.  I called the patient's sister, who wanted her to be comfortable.  --She does have a wound in her back  She has been accepted by hospice and significant need for chemotherapy  considering patient poor performance status as per Medical oncology in 5/2021 and radiation oncology.  Patient cancer continue to grow during this.  Since diagnosis in May 2021.  --She is very frail debilitated    10/30:  --No acute events overnight, patient is alert and oriented x1-2.  Vital signs stable, provide boost 3 times daily.  Patient had an x-ray by hospice  -- She needs to be discharged on group home on home hospice    Medically cleared to be discharged to group home on Hospice    10/31:  --No acute events overnight, laying in bed continuing to complain.  Patient is alert and oriented x1, knows her name.  --Plan is to discharge the patient to group home on hospice, case management arranging group home    Continues to remain , will continue ivf     Consultants/Specialty  orthopedics    Code Status  DNAR/DNI    Disposition  Patient is  Medically cleared to be discharged to group home on hospice    Anticipate discharge to to be determine .  I have placed the appropriate orders for post-discharge needs.    Review of Systems  Limited secondary to patient pain mentation    Physical Exam  Temp:  [36.4 °C (97.5 °F)-37 °C (98.6 °F)] 37 °C (98.6 °F)  Pulse:  [] 118  Resp:  [16-19] 19  BP: (105-113)/(60-69) 113/69  SpO2:  [88 %-99 %] 94 %    Physical Exam  Vitals and nursing note reviewed.   Constitutional:       Appearance: She is ill-appearing.      Comments:  Frail and awake    HENT:      Head: Normocephalic and atraumatic.   Eyes:      Extraocular Movements: Extraocular movements intact.   Cardiovascular:      Rate and Rhythm: Normal rate.      Heart sounds: Murmur heard.     Pulmonary:      Effort: Pulmonary effort is normal. No respiratory distress.      Breath sounds: No wheezing.   Abdominal:      General: There is no distension.      Palpations: Abdomen is soft.      Tenderness: There is no abdominal tenderness.      Comments: wound VAC in her back   Musculoskeletal:      Cervical back: Neck supple.       Comments: Wound vac on her back    Skin:     General: Skin is dry.      Coloration: Skin is not jaundiced.   Neurological:      Mental Status: She is alert.      Motor: No weakness.      Comments: Knows her name     Psychiatric:         Mood and Affect: Mood normal.         Fluids    Intake/Output Summary (Last 24 hours) at 10/31/2021 1321  Last data filed at 10/30/2021 2000  Gross per 24 hour   Intake 40 ml   Output --   Net 40 ml       Laboratory  Recent Labs     10/30/21  0604   WBC 6.5   RBC 3.90*   HEMOGLOBIN 10.9*   HEMATOCRIT 35.6*   MCV 91.3   MCH 27.9   MCHC 30.6*   RDW 50.9*   PLATELETCT 349   MPV 9.5     Recent Labs     10/30/21  0604   SODIUM 139   POTASSIUM 3.4*   CHLORIDE 104   CO2 26   GLUCOSE 100*   BUN 9   CREATININE 0.53   CALCIUM 9.5                   Imaging  DX-CHEST-LIMITED (1 VIEW)   Final Result      1.  Nodular opacity in the left lower lobe, possibly representing early pneumonia or atelectasis. Follow-up imaging is recommended to document resolution.      2.  Thickening of the right minor fissure, possibly related to scarring or atelectasis.      3.  Increased nodular densities projecting over the right lower lung, possibly external to the patient. CT of the chest may be helpful for further evaluation.      CT-HEAD W/O   Final Result      1.  Cerebral atrophy.      2.  White matter lucencies most consistent with small vessel ischemic change versus demyelination or gliosis.      3.  Otherwise, Head CT without contrast with no acute findings. No evidence of acute cerebral infarction, hemorrhage or mass lesion.      DX-SHOULDER 2+ RIGHT   Final Result         Acute comminuted and mildly displaced fracture of the distal clavicle. No involvement of the AC joint.           Assessment/Plan  Advance care planning  Assessment & Plan   I discussed the case with the patient's sister/patient Daughter over the phone .  I discussed advance care planning  For at least 35 minutes over the phone as  patient daughter/sister could not be available face-to-face.,  I discussed with them including diagnosis, prognosis, plan of care, risks and benefits of any therapies that could be offered, as well as alternatives including palliation and hospice, as appropriate.  I placed a hospice referral  Changed CODE STATUS DNI/DN AR      Infection of lumbar spine (HCC)- (present on admission)  Assessment & Plan  She has wound vac in place  Follows with Dr. Canales  No other records available, [facility]     Closed nondisplaced fracture of acromial end of right clavicle- (present on admission)  Assessment & Plan  Patient was transferred with a sling, will continue for support.  Pain control.  PT/OT  Right upper extremity neurovascularly intact, range of motion intact  No surgery per ortho  Needs to follow up as OP     Encephalopathy- (present on admission)  Assessment & Plan  In setting of UTI and sepsis verss  --Patient mentation has not been cleared, MRI of the brain that was obtained at [facility] pending   Pending records from [facility]    Chronic pain syndrome- (present on admission)  Assessment & Plan  will hold opoid     Acquired hypothyroidism- (present on admission)  Assessment & Plan  Continue Synthroid 50 mcg daily    Paroxysmal atrial fibrillation (HCC)- (present on admission)  Assessment & Plan  Rate well controlled on metoprolol tartrate 25 mg p.o. twice daily, continue Xarelto for secondary hypercoagulable state    Sepsis (HCC)- (present on admission)  Assessment & Plan  This is Sepsis Present on admission  SIRS criteria identified on my evaluation include: Tachycardia, with heart rate greater than 90 BPM, Tachypnea, with respirations greater than 20 per minute and Leukocytosis, with WBC greater than 12,000  Source is Urinary  Sepsis protocol initiated  Fluid resuscitation ordered per protocol  IV antibiotics as appropriate for source of sepsis  While organ dysfunction may be noted elsewhere  in this problem list or in the chart, degree of organ dysfunction does not meet CMS criteria for severe sepsis  She was started on ceftriaxone, she received fosphomycin x 1 as UA showed ESBL    COPD (chronic obstructive pulmonary disease) (HCC)- (present on admission)  Assessment & Plan  Not in acute exacerbation.  DuoNebs as needed.      Non-small cell lung cancer (NSCLC) (HCC)- (present on admission)  Assessment & Plan  Follows with radiation oncology Dr. Fairbanks at [facility]   Case has been  discussed with Dr. Fairbanks for radiation oncology.     ---Patient was evaluated by medical oncology on 5/2021, stated and not a candidate for chemotherapy due to patient performance status.  After discussing extensively with radiation oncology sister that patient is a candidate for hospice.  I discussed the case with patient's daughter/sister, hospice referral has been placed  CM updated, she has been accepted to hospice    Also, her daughter/sister stated that they cant take care of her full time; They stated that she will need to go to group home for hospice   Case management reaching out to leadership           I have performed a physical exam and reviewed and updated ROS and Plan today (10/31/2021). In review of yesterday's note (10/30/2021), there are no changes except as documented above.     VTE prophylaxis: therapeutic anticoagulation with xarelto

## 2021-10-31 NOTE — PROGRESS NOTES
Bedside report received and patient care assumed. Pt in bed, crying out in pain, disheveled in appearance, alert and disoriented. A&Ox0.  Pt unable to engage in assessment and does not response to questions. Appears to be preoccupied. Unable to be redirected. Unable to fully assess patient at this time. All fall and aspiration precautions are in place, belongings at bedside table.     Crisis Charting: COVID-19 surge in effect

## 2021-10-31 NOTE — CARE PLAN
The patient is Watcher - Medium risk of patient condition declining or worsening    Shift Goals  Clinical Goals: Maintain safety and prevent falls, maintain / improve skin integrity, monitor neuro status q 4 neuro checks, increase PO intake, manage and control pain  Patient Goals: Comfort, rest  Family Goals: IZABEL. No family present    Progress made toward(s) clinical / shift goals:    Problem: Pain - Standard  Goal: Alleviation of pain or a reduction in pain to the patient’s comfort goal  Outcome: Progressing     Problem: Hemodynamics  Goal: Patient's hemodynamics, fluid balance and neurologic status will be stable or improve  Outcome: Progressing     Problem: Respiratory  Goal: Patient will achieve/maintain optimum respiratory ventilation and gas exchange  Outcome: Progressing     Problem: Fall Risk  Goal: Patient will remain free from falls  Outcome: Progressing     Problem: Skin Integrity  Goal: Skin integrity is maintained or improved  Outcome: Progressing     Problem: Safety - Medical Restraint  Goal: Remains free of injury from restraints (Restraint for Interference with Medical Device)  Outcome: Progressing     Patient is not progressing towards the following goals:    Problem: Safety - Medical Restraint  Goal: Free from restraint(s) (Restraint for Interference with Medical Device)  Outcome: Not Progressing

## 2021-10-31 NOTE — PROGRESS NOTES
Pt alert and disoriented. A&Ox0. Pt continuously pulling lines, removing oxygen, and attempting to remove wound vac and get out of bed. Pt is a high fall risk w/ hx of falls. Pt is not redirectable at this time. Pt educated multiple times; w/o evidence of learning.     MD notified. Per MD new orders to follow.

## 2021-11-01 PROCEDURE — 770001 HCHG ROOM/CARE - MED/SURG/GYN PRIV*

## 2021-11-01 PROCEDURE — 99232 SBSQ HOSP IP/OBS MODERATE 35: CPT | Performed by: HOSPITALIST

## 2021-11-01 PROCEDURE — 700102 HCHG RX REV CODE 250 W/ 637 OVERRIDE(OP): Performed by: INTERNAL MEDICINE

## 2021-11-01 PROCEDURE — 700102 HCHG RX REV CODE 250 W/ 637 OVERRIDE(OP): Performed by: STUDENT IN AN ORGANIZED HEALTH CARE EDUCATION/TRAINING PROGRAM

## 2021-11-01 PROCEDURE — A9270 NON-COVERED ITEM OR SERVICE: HCPCS | Performed by: STUDENT IN AN ORGANIZED HEALTH CARE EDUCATION/TRAINING PROGRAM

## 2021-11-01 PROCEDURE — A9270 NON-COVERED ITEM OR SERVICE: HCPCS | Performed by: INTERNAL MEDICINE

## 2021-11-01 PROCEDURE — 97605 NEG PRS WND THER DME<=50SQCM: CPT

## 2021-11-01 PROCEDURE — 700111 HCHG RX REV CODE 636 W/ 250 OVERRIDE (IP): Performed by: STUDENT IN AN ORGANIZED HEALTH CARE EDUCATION/TRAINING PROGRAM

## 2021-11-01 PROCEDURE — 302098 PASTE RING (FLAT): Performed by: HOSPITALIST

## 2021-11-01 RX ADMIN — METOPROLOL TARTRATE 25 MG: 25 TABLET, FILM COATED ORAL at 18:37

## 2021-11-01 RX ADMIN — LEVOTHYROXINE SODIUM 50 MCG: 0.05 TABLET ORAL at 06:17

## 2021-11-01 RX ADMIN — METOPROLOL TARTRATE 25 MG: 25 TABLET, FILM COATED ORAL at 06:17

## 2021-11-01 RX ADMIN — DOCUSATE SODIUM 50 MG AND SENNOSIDES 8.6 MG 2 TABLET: 8.6; 5 TABLET, FILM COATED ORAL at 18:37

## 2021-11-01 RX ADMIN — OXYCODONE HYDROCHLORIDE 10 MG: 10 TABLET ORAL at 06:17

## 2021-11-01 RX ADMIN — HYDROMORPHONE HYDROCHLORIDE 0.5 MG: 1 INJECTION, SOLUTION INTRAMUSCULAR; INTRAVENOUS; SUBCUTANEOUS at 10:26

## 2021-11-01 RX ADMIN — DOCUSATE SODIUM 50 MG AND SENNOSIDES 8.6 MG 2 TABLET: 8.6; 5 TABLET, FILM COATED ORAL at 06:18

## 2021-11-01 RX ADMIN — OXYCODONE HYDROCHLORIDE 10 MG: 10 TABLET ORAL at 18:40

## 2021-11-01 ASSESSMENT — PAIN DESCRIPTION - PAIN TYPE
TYPE: ACUTE PAIN

## 2021-11-01 NOTE — WOUND TEAM
[facility]  Inpatient Services  Wound and Skin Care Progress Note    Admission Date: 10/24/2021     Last order of IP CONSULT TO WOUND CARE was found on 10/24/2021 from Hospital Encounter on 10/24/2021     HPI, PMH, SH: Reviewed    Past Surgical History:   Procedure Laterality Date   • WIDE EXCISION  8/7/2009    Performed by AMADO HALL at SURGERY [facility] ORS   • WIDE EXCISION  9/18/08    Performed by GERRY CASTILLO at SURGERY [facility] ORS   • FLAP CLOSURE  9/18/08    Performed by GERRY CASTILLO at SURGERY [facility] ORS   • MASS EXCISION GENERAL  2008    left upper arm  (cancerous )   • CHOLECYSTECTOMY  2000    laparoscopically    • BIOPSY BREAST  1990    right breast biopsy  (benign)   • FOOT SURGERY  1990    mass removed from right foot   • HYSTERECTOMY RADICAL  1974    abdominal      Social History     Tobacco Use   • Smoking status: Former Smoker     Types: Cigarettes   • Smokeless tobacco: Never Used   • Tobacco comment: Quit in 1989   Substance Use Topics   • Alcohol use: No     No chief complaint on file.    Diagnosis: Sepsis (HCC) [A41.9]    Unit where seen by Wound Team: T312/02     WOUND CONSULT/FOLLOW UP RELATED TO:  Sacrum, right heel and mid-back NPWT     WOUND HISTORY:  Patient was unable to provide history on back.  Patient from outside facility    WOUND ASSESSMENT/LDA     Negative Pressure Wound Therapy Back Mid (Active)   NPWT Pump Mode / Pressure Setting Ulta;Continuous;125 mmHg 11/01/21 1000   Dressing Type Small;Black Foam (Regular) 11/01/21 1000   Number of Foam Pieces Used 3 11/01/21 1000   Canister Changed No 11/01/21 1000   NEXT Dressing Change/Treatment Date 11/03/21 11/01/21 1000   WOUND NURSE ONLY - Time Spent with Patient (mins) 45 11/01/21 1000          Wound 10/25/21 Full Thickness Wound Back Mid (Active)   Wound Image    11/01/21 1000   Site Assessment [place] 11/01/21 1000   Periwound Assessment Clean;Dry;Intact 11/01/21 1000   Margins Unattached  edges;Undefined edges 11/01/21 1000   Closure Secondary intention 11/01/21 1000   Drainage Amount Moderate 11/01/21 1000   Drainage Description Serosanguineous;Sanguineous 11/01/21 1000   Treatments Cleansed;Irrigation;Site care 11/01/21 1000   Wound Cleansing Approved Wound Cleanser 11/01/21 1000   Periwound Protectant Skin Protectant Wipes to Periwound;Drape 11/01/21 1000   Dressing Cleansing/Solutions Not Applicable 11/01/21 1000   Dressing Options Wound Vac 11/01/21 1000   Dressing Changed Changed 11/01/21 1000   Dressing Status Clean;Dry;Intact 11/01/21 1000   Dressing Change/Treatment Frequency Monday, Wednesday, Friday, and As Needed 11/01/21 1000   NEXT Dressing Change/Treatment Date 11/03/21 11/01/21 1000   NEXT Weekly Photo (Inpatient Only) 11/08/21 11/01/21 1000   Non-staged Wound Description Full thickness 11/01/21 1000   Wound Length (cm) 7 cm 11/01/21 1000   Wound Width (cm) 2.2 cm 11/01/21 1000   Wound Depth (cm) 2.5 cm 11/01/21 1000   Wound Surface Area (cm^2) 15.4 cm^2 11/01/21 1000   Wound Volume (cm^3) 38.5 cm^3 11/01/21 1000   Wound Healing % -282 11/01/21 1000   Shape oval 11/01/21 1000   Wound Odor None 11/01/21 1000   Exposed Structures IZABEL;None 11/01/21 1000   WOUND NURSE ONLY - Time Spent with Patient (mins) 45 11/01/21 1000          Vascular:    GOPAL:   No results found.    Lab Values:    Lab Results   Component Value Date/Time    WBC 6.5 10/30/2021 06:04 AM    RBC 3.90 (L) 10/30/2021 06:04 AM    HEMOGLOBIN 10.9 (L) 10/30/2021 06:04 AM    HEMATOCRIT 35.6 (L) 10/30/2021 06:04 AM        Culture Results show:  No results found for this or any previous visit (from the past 720 hour(s)).    Pain Level/Medicated:  Pre-medicated       INTERVENTIONS BY WOUND TEAM:  Performed standard wound care which includes appropriate positioning, dressing removal and non-selective debridement. Pictures and measurements obtained weekly if/when required.  Preparation for Dressing removal: Dressing soaked with  wound cleanser   Non-selectively Debrided with:  wound cleanser and gauze.  Sharp debridement: NA  Michaela wound: Cleansed with wound cleanser and gauze, Prepped with no sting skin prep, and drape  Primary Dressing: ramon activated by saline total 3 black foam to wound bed bridged to R lateral gluteal area over soft tissue avoiding bony areas of the pelvis and sealed with drape.  Secondary (Outer) Dressing: mepilex to offload tubing and TRAC pad.    Interdisciplinary consultation: Patient, Bedside RN     EVALUATION / RATIONALE FOR TREATMENT:  Most Recent Date:  11/01/21: Silver nitrate to help with hypergranular tissue, collagen to wound base and UM on proximal end.  Continue with NPWT and offloading.   10/29/21 granular tissue is dull pink with irregular surface.  Added collagen to enhance cellular activity.    10/27/21: Mid back wound with persistent hypergranulation although a little less than last treatment. Patient was having significant pain, but denied it in her wound and mostly when moving her. Did not assess sacrum today. Dressing CDI.   10/25/21: mid-back wound has hypergranulation, silver nitrate applied to hypergranulation to help possibly flatten down the growth.  Possibly need provider debridement to remove all hypergranulation.  NPWT continued to help with drainage.  Sacrum is a POA DTI, offloaded with sacral mepilex. Right heel is callus and dry skin, no wounds.      Goals: Steady decrease in wound area and depth weekly.    WOUND TEAM PLAN OF CARE ([X] for frequency of wound follow up,):   Nursing to follow orders written for wound care. Contact wound team if area fails to progress, deteriorates or with any questions/concerns  Dressing changes by wound team:                   Follow up 3 times weekly:                NPWT change 3 times weekly:  X,  Mid-back  Follow up 1-2 times weekly:      Follow up Bi-Monthly:                   Follow up as needed:   sacrum  Other (explain):     NURSING PLAN OF CARE  ORDERS (X):  Dressing changes: See Dressing Care orders: x  Skin care: See Skin Care orders:   RN Prevention Protocol: x  Rectal tube care: See Rectal Tube Care orders:   Other orders:    Anticipated discharge plans:   LTACH:        SNF/Rehab:  x                Home Health Care:           Outpatient Wound Center:            Self/Family Care:        Other:                  Vac Discharge Needs:   Not Applicable Pt not on a wound vac:       Regular Vac while inpatient, alternative dressing at DC:        Regular Vac in use and continued at DC:          x  Reg. Vac w/ Skin Sub/Biologic in use. Will need to be changed 2x wkly:      Veraflo Vac while inpatient, ok to transition to Regular Vac on Discharge:           Veraflo Vac while inpatient, will need to remain on Veraflo Vac upon discharge:

## 2021-11-01 NOTE — CARE PLAN
"  Problem: Knowledge Deficit - Standard  Goal: Patient and family/care givers will demonstrate understanding of plan of care, disease process/condition, diagnostic tests and medications  Outcome: POC discussed with pt. Oxygen use and O2 sat been educated to the pt.     Problem: Pain - Standard  Goal: Alleviation of pain or a reduction in pain to the patient’s comfort goal  Outcome: Pain medicated according to the MAR     Problem: Skin Integrity  Goal: Skin integrity is maintained or improved  Outcome: Q2 turns, waffle in place, mepliex in place, and barrier paste applied     The patient is Watcher - Medium risk of patient condition declining or worsening    Shift Goals  Clinical Goals: safety, pain control, improve skin integrity, increase PO intake  Patient Goals: \"I want to go home\"  Family Goals: N/A          "

## 2021-11-01 NOTE — CARE PLAN
"  Problem: Knowledge Deficit - Standard  Goal: Patient and family/care givers will demonstrate understanding of plan of care, disease process/condition, diagnostic tests and medications  Outcome: Progressing     Problem: Pain - Standard  Goal: Alleviation of pain or a reduction in pain to the patient’s comfort goal  Outcome: Progressing   The patient is Stable - Low risk of patient condition declining or worsening    Shift Goals  Clinical Goals: safety, pain control   Patient Goals: \"I want to go home\"  Family Goals: N/A    Progress made toward(s) clinical / shift goals:  Patients pain is being controlled with PRN pain medications, she is still confused and disoriented in conversation.     Patient is not progressing towards the following goals:    Assumed care of patient at change of shift.   Report received at bedside rounding  Patient is calm, in bed, with no distress noted  Bed alarm is on   Call light and patient belongings are in reach, bed is locked and in lowest position- hourly rounding is in place. See flow sheets for further assessment.    "

## 2021-11-01 NOTE — PALLIATIVE CARE
PALLIATIVE CARE FOLLOW-UP:    Pt calm in this encounter though confused (oriented to name only). Attempted to reorient to situation and environment, shared that IDT looking for a place to help care for her.    Phoned separately pt's dtr Tiffany and pt's sister Jl. Provided update. Tiffany queried if pt knows she is not going home - concerned with upsetting pt. Shared that I had not specifically told pt she would never go home and Tiffany and Jl both supported this approach.     Jl is coming into town Wednesday or Thursday - requested Jl call PC office when she knows she will be at bedside (also encouraged her to let [facility] know) so that paperwork can be completed (POLST from us, hospice paperwork from [facility]). Jl stated she would let us all know.     Discussed with/Updated:    BREONNA Vidales      Plan:  POLST when pt's sister at bedside. Pending GH with [facility]. Palliative Care to continue to follow, provide support, and help facilitate decision-making as needed.    Thank you for allowing Palliative Care to support this pt and their family.  Contact [phone] for additional assistance, patient status change, questions or concerns.

## 2021-11-01 NOTE — DISCHARGE PLANNING
Anticipated Discharge Disposition: Hospice with group home on [facility].     Action: Patient previously accepted by [facility]. Patient requires group home as she is not safe to discharge home even with four hours of private pay caregivers. Patient and family unable to afford group home placement and patient will require [facility] for group home.    LSW spoke with Titus from [facility] (#[phone]) who confirmed that he does have availability for patient with [facility] and requested that clinical be faxed to him at #[phone]. Titus to review referral and get back to this LSW if able to accept. If able to accept, LSW to complete [facility] to have CM leadership authorize. LSW faxed facesheet, H&P, latest physician progress note, PT/OT notes, palliative consult and wound team note to provided fax number.     Barriers to Discharge: GH placement.     Plan: CM to continue to follow for discharge needs; CM to find accepting  with [facility].

## 2021-11-02 PROCEDURE — 700102 HCHG RX REV CODE 250 W/ 637 OVERRIDE(OP): Performed by: STUDENT IN AN ORGANIZED HEALTH CARE EDUCATION/TRAINING PROGRAM

## 2021-11-02 PROCEDURE — 700102 HCHG RX REV CODE 250 W/ 637 OVERRIDE(OP): Performed by: INTERNAL MEDICINE

## 2021-11-02 PROCEDURE — 770001 HCHG ROOM/CARE - MED/SURG/GYN PRIV*

## 2021-11-02 PROCEDURE — A9270 NON-COVERED ITEM OR SERVICE: HCPCS | Performed by: STUDENT IN AN ORGANIZED HEALTH CARE EDUCATION/TRAINING PROGRAM

## 2021-11-02 PROCEDURE — A9270 NON-COVERED ITEM OR SERVICE: HCPCS | Performed by: INTERNAL MEDICINE

## 2021-11-02 PROCEDURE — 99231 SBSQ HOSP IP/OBS SF/LOW 25: CPT | Performed by: INTERNAL MEDICINE

## 2021-11-02 RX ADMIN — OXYCODONE 5 MG: 5 TABLET ORAL at 23:29

## 2021-11-02 RX ADMIN — METOPROLOL TARTRATE 25 MG: 25 TABLET, FILM COATED ORAL at 06:17

## 2021-11-02 RX ADMIN — DOCUSATE SODIUM 50 MG AND SENNOSIDES 8.6 MG 2 TABLET: 8.6; 5 TABLET, FILM COATED ORAL at 06:17

## 2021-11-02 RX ADMIN — OXYCODONE HYDROCHLORIDE 10 MG: 10 TABLET ORAL at 16:38

## 2021-11-02 RX ADMIN — METOPROLOL TARTRATE 25 MG: 25 TABLET, FILM COATED ORAL at 16:38

## 2021-11-02 RX ADMIN — LEVOTHYROXINE SODIUM 50 MCG: 0.05 TABLET ORAL at 06:17

## 2021-11-02 RX ADMIN — RIVAROXABAN 20 MG: 20 TABLET, FILM COATED ORAL at 21:17

## 2021-11-02 ASSESSMENT — PAIN DESCRIPTION - PAIN TYPE
TYPE: ACUTE PAIN

## 2021-11-02 NOTE — CARE PLAN
The patient is Watcher - Medium risk of patient condition declining or worsening    Shift Goals  Clinical Goals: safety  Patient Goals: no response  Family Goals: N/A    Progress made toward(s) clinical / shift goals:  mobility, up to commode x 2    Patient is not progressing towards the following goals: still confused conversation      Problem: Knowledge Deficit - Standard  Goal: Patient and family/care givers will demonstrate understanding of plan of care, disease process/condition, diagnostic tests and medications  Outcome: Not Progressing     Problem: Pain - Standard  Goal: Alleviation of pain or a reduction in pain to the patient’s comfort goal  Outcome: Progressing     Problem: Respiratory  Goal: Patient will achieve/maintain optimum respiratory ventilation and gas exchange  Outcome: Progressing

## 2021-11-02 NOTE — THERAPY
"Missed Therapy     Patient Name: Idalia Freitas  Age:  81 y.o., Sex:  female  Medical Record #: [id number]  Today's Date: 11/2/2021    Discussed missed therapy with RN. OT attempted X2 to see pt for OT tx. First attempt, pt in with nursing needs and not available. Second attempt pt calling out in room for her dtr asking,  \"Where is she?\" According to  dtr lives out of state as well as pt's sister. Pt highly confused, crying and not able to fully follow commands distracted by being hyper verbose and non-stop crying. Pt not able to fully participate. OT tx held. Will attempt later as able/time permitting. RN/SW/OTR updated. Will continue to follow pt and attempt OT tx when pt can fully participate.          11/02/21 1254   Interdisciplinary Plan of Care Collaboration   IDT Collaboration with  Nursing;  (Pt may be d/cing to GH on hospice per . )   Collaboration Comments Attempted to see pt X2 today. First attempt, pt in with nursing needs. Second attempt, pt highly confused, crying and not following commands, hyper verbose. RN informed of OT attempts.    Session Information   Date / Session Number  Attempted X2 11/2,  Pt unable to fully participate. Last seen 10/24, #1 (1/3, 10/30)    Priority 3  (as pt is able to fully participate)     "

## 2021-11-02 NOTE — CARE PLAN
Problem: Nutritional:  Goal: Achieve adequate nutritional intake  Description: Patient will consume 25-50% of meals/supplements  Outcome: Not Met     See RD note.

## 2021-11-02 NOTE — CARE PLAN
The patient is Unstable - High likelihood or risk of patient condition declining or worsening      Shift Goals  Clinical Goals: safety; comfort  Patient Goals: go home  Family Goals: N/A      Patient is not progressing towards the following goals: pt is very impulsive and confused.  Telesitter in place.      Problem: Knowledge Deficit - Standard  Goal: Patient and family/care givers will demonstrate understanding of plan of care, disease process/condition, diagnostic tests and medications  Outcome: Not Progressing

## 2021-11-02 NOTE — DIETARY
Nutrition Services: Update   Day 9 of admit.  Idalia Freitas is a 81 y.o. female with admitting DX of UTI (urinary tract infection) [N39.0]  Sepsis (HCC) [A41.9]    Pt is currently on regular diet with Boost Plus provided TID. No recent documentation of PO intake since 10/30. PO between 10/28 and 10/30 was variable with a range of < 25% to 50-75% and average of 25%. PO of Boost Plus has been 25-50%. Current PO intake of meals and supplements is most likely not meeting pt's estimated needs for wound healing.     Per chart review, plan is for pt to D/C to group home with hospice care.      Malnutrition Risk: risk noted due to poor PO intake.    Recommendations/Plan:  1. Continue with Boost supplements as ordered.    2. Encourage intake of meals  3. Document intake of all meals as % taken in ADL's to provide interdisciplinary communication across all shifts.   4. Monitor weight.  5. Nutrition rep will continue to see patient for ongoing meal and snack preferences.    If D/C to group home does not occur, RD will continue to follow.

## 2021-11-02 NOTE — DISCHARGE PLANNING
Anticipated Discharge Disposition: Hospice with group home on [facility].     Action: Patient previously accepted by [facility]. Patient requires group home as she is not safe to discharge home even with four hours of private pay caregivers. Patient and family unable to afford group home placement and patient will require [facility] for group home.     LSW spoke with Titus Gallo  (#[phone]) who confirmed that after reviewing referral he can accept patient. Patient is max assist and total monthly cost of placement is $4,500.     LSW spoke with patient's daughter, Tiffany (#[phone]) who agreed to speak with family to determine if they can assist in cost of care. Tiffany to confirm about they can contribute by tomorrow morning.     Barriers to Discharge: GH placement; requires [facility]; family to determine if they can assist in cost of care.     Plan: CM to continue to follow for discharge needs; CM to find accepting GH with [facility].

## 2021-11-02 NOTE — PROGRESS NOTES
Hospital Medicine Daily Progress Note    Date of Service  11/1/2021    Chief Complaint  Idalia HALE is a 81 y.o. female admitted 10/24/2021 with sepsis, ground-level fall, right acromial distal fracture, UTI    Hospital Course  This is a 81 -year-old female with a past medical significant for COPD not on home O2 , urinary incontinence, diabetes mellitus, prior VT, hypertension  A. fib, secondary hypercoagulable state, not on anticoagulation, lung cancer diagnosed in July 2021, chronic back wound with wound VAC in place, followed by Dr. Canales and wound care team was transferred from [facility] on 10/24/2021.    Patient reports having a ground-level fall, leading to distal clavicle fracture hypokalemia resume 2.6, septic with elevated WBC 11.9, tachycardic with heart rate of 116.    Unfortunately blood cultures were not obtained during the admission secondary to urinary tract infection.  Urine culture was obtained, patient was started on ceftriaxone.  Regarding her distal clavicle fracture, orthopedic surgery  Dr Whalen was consulted, recommended sling for comfort, pain control, recommended following up with repeat x-ray in 2 weeks.    Patient noted that patient was encephalopathic thought to be secondary to urinary tract infection.  Upon my evaluation today, she is noted to be alert and oriented 2-3; not able to answer complex questions.  PT/OT has evaluated the patient and recommend postacute.  Patient needs maximum assist at this time.  She is not safe to go home.    I called patient sister, she does for Dr. Chen of radiation oncology at [place], she has an MRI of the brain obtained 10/20/2021, requesting records.  Patient sister stated that he has been hospitalized at [place] and received 4 to 6 weeks of IV antibiotic for her back wound.  Wound care has been consulted during the stay in the hospital.  Blood culture has been ordered    Interval Problem Update  10/26:  No  acute events overnight, patient is alert and oriented x2-3.  Vital signs stable, currently saturating well on room air.  Blood culture has been ordered, urine culture growing greater than 100,000 ESBL E. coli, will provide fosfomycin  --Noted to be anemic with hemoglobin of 11.5, will transfuse if less than 7  PT/OT evaluated, recommend postacute.  Patient does not wanted to go to skilled nursing facility as he is max assist    Patient is alert and oriented x1-2, her mentation fluctuating, cognitive evaluation ordered.  Patient does not have POA for medical.    10/27:  --Patient continued to be confused elated x2.  I had an extensive discussion with the patient oldest daughter and patient sister, CODE STATUS has been changed to DNI/DN AR.  After next discussion with the patient sister/daughter, hospice referral has been placed.  --I also discussed the case with Dr. Fairbanks of radiation oncology, it is noted that patient has stage III to stage IV lung cancer which has been progressed since May.  Patient had not received any chemotherapy/radiation therapy.  Dr. Hoyt of oncology has evaluated the patient on May 2021 who stated that because of the patient's poor performance status she is not a candidate of chemotherapy.    --I discussed with radiation oncology who stated that because of the patient worsening mental status.  Patient is a candidate of hospice option extensive discussion of the patient/discussed-over the phone, decision was made to have a hospice referral.    10/28:  --No acute events overnight, laying in bed, denies any complaint, she continues to remain confused  --Hospice referral has been sent  Patient is medically stable to be discharged to home on home hospice    10/29:  --No acute events overnight, vital signs stable.  I called the patient's sister, who wanted her to be comfortable.  --She does have a wound in her back  She has been accepted by hospice and significant need for chemotherapy  considering patient poor performance status as per Medical oncology in 5/2021 and radiation oncology.  Patient cancer continue to grow during this.  Since diagnosis in May 2021.  --She is very frail debilitated    10/30:  --No acute events overnight, patient is alert and oriented x1-2.  Vital signs stable, provide boost 3 times daily.  Patient had an x-ray by hospice  -- She needs to be discharged on group home on home hospice    Medically cleared to be discharged to group home on Hospice    10/31:  --No acute events overnight, laying in bed continuing to complain.  Patient is alert and oriented x1, knows her name.  --Plan is to discharge the patient to group home on hospice, case management arranging group home  --Continues to remain , will continue ivf     11/01:  No acute events, laying in a bed Patient is alert and oriented x1.  Palliative following.  Plan is to start the patient to go to group home on hospice.  She has been accepted to continue to hospice, pending group on medication admitted to group home.   --Vital sign has bene stable     Consultants/Specialty  orthopedics    Code Status  DNAR/DNI    Disposition  Patient is  Medically cleared to be discharged to group home on hospice    Anticipate discharge to to be determine .  I have placed the appropriate orders for post-discharge needs.    Review of Systems  Limited secondary to patient pain mentation    Physical Exam  Temp:  [36.4 °C (97.5 °F)-37.4 °C (99.3 °F)] 36.4 °C (97.5 °F)  Pulse:  [76-97] 76  Resp:  [16-17] 16  BP: (100-119)/(43-74) 100/43  SpO2:  [94 %-97 %] 94 %    Physical Exam  Vitals and nursing note reviewed.   Constitutional:       Appearance: She is ill-appearing.      Comments:  Frail and awake    HENT:      Head: Normocephalic and atraumatic.   Eyes:      Extraocular Movements: Extraocular movements intact.   Cardiovascular:      Rate and Rhythm: Normal rate.      Heart sounds: Murmur heard.     Pulmonary:      Effort: Pulmonary effort  is normal. No respiratory distress.      Breath sounds: No wheezing.   Abdominal:      General: There is no distension.      Palpations: Abdomen is soft.      Tenderness: There is no abdominal tenderness.      Comments: wound VAC in her back   Musculoskeletal:      Cervical back: Neck supple.      Comments: Wound vac on her back    Skin:     General: Skin is dry.      Coloration: Skin is not jaundiced.   Neurological:      Mental Status: She is alert.      Motor: No weakness.      Comments: Knows her name, doesn't remember where he sees me the president or why she has been here   Psychiatric:         Mood and Affect: Mood normal.         Fluids  No intake or output data in the 24 hours ending 11/01/21 1913    Laboratory  Recent Labs     10/30/21  0604   WBC 6.5   RBC 3.90*   HEMOGLOBIN 10.9*   HEMATOCRIT 35.6*   MCV 91.3   MCH 27.9   MCHC 30.6*   RDW 50.9*   PLATELETCT 349   MPV 9.5     Recent Labs     10/30/21  0604   SODIUM 139   POTASSIUM 3.4*   CHLORIDE 104   CO2 26   GLUCOSE 100*   BUN 9   CREATININE 0.53   CALCIUM 9.5                   Imaging  DX-CHEST-LIMITED (1 VIEW)   Final Result      1.  Nodular opacity in the left lower lobe, possibly representing early pneumonia or atelectasis. Follow-up imaging is recommended to document resolution.      2.  Thickening of the right minor fissure, possibly related to scarring or atelectasis.      3.  Increased nodular densities projecting over the right lower lung, possibly external to the patient. CT of the chest may be helpful for further evaluation.      CT-HEAD W/O   Final Result      1.  Cerebral atrophy.      2.  White matter lucencies most consistent with small vessel ischemic change versus demyelination or gliosis.      3.  Otherwise, Head CT without contrast with no acute findings. No evidence of acute cerebral infarction, hemorrhage or mass lesion.      DX-SHOULDER 2+ RIGHT   Final Result         Acute comminuted and mildly displaced fracture of the distal  clavicle. No involvement of the AC joint.           Assessment/Plan  Non-small cell lung cancer (NSCLC) (HCC)- (present on admission)  Assessment & Plan  Follows with radiation oncology Dr. Fairbanks at [facility]   Case has been  discussed with Dr. Fairbanks for radiation oncology.     ---Patient was evaluated by medical oncology on 5/2021, stated and not a candidate for chemotherapy due to patient performance status.  After discussing extensively with radiation oncology sister that patient is a candidate for hospice.  I discussed the case with patient's daughter/sister, hospice referral has been placed  CM updated, she has been accepted to hospice    Also, her daughter/sister stated that they cant take care of her full time; They stated that she will need to go to group home for hospice   Case management reaching out to leadership    Advance care planning  Assessment & Plan   I discussed the case with the patient's sister/patient Daughter over the phone .  I discussed advance care planning  For at least 35 minutes over the phone as patient daughter/sister could not be available face-to-face.,  I discussed with them including diagnosis, prognosis, plan of care, risks and benefits of any therapies that could be offered, as well as alternatives including palliation and hospice, as appropriate.  I placed a hospice referral  Changed CODE STATUS DNI/DN AR      Infection of lumbar spine (HCC)- (present on admission)  Assessment & Plan  Hx of    She has wound vac in place  Follows with Dr. Canales       Closed nondisplaced fracture of acromial end of right clavicle- (present on admission)  Assessment & Plan  Patient was transferred with a sling, will continue for support.  Pain control.  Right upper extremity neurovascularly intact, range of motion intact  No surgery per ortho   -- plan to dc on hospice    Encephalopathy- (present on admission)  Assessment & Plan  In setting of UTI and sepsis verss  --Patient mentation has not been  cleared, MRI of the brain  Without contrast that was obtained at [place]': no obvious mets    Chronic pain syndrome- (present on admission)  Assessment & Plan  will hold opoid     Acquired hypothyroidism- (present on admission)  Assessment & Plan  Continue Synthroid 50 mcg daily    Paroxysmal atrial fibrillation (HCC)- (present on admission)  Assessment & Plan  Rate well controlled on metoprolol tartrate 25 mg p.o. twice daily, continue Xarelto for secondary hypercoagulable state    Sepsis (HCC)- (present on admission)  Assessment & Plan  Resolved    This is Sepsis Present on admission  SIRS criteria identified on my evaluation include: Tachycardia, with heart rate greater than 90 BPM, Tachypnea, with respirations greater than 20 per minute and Leukocytosis, with WBC greater than 12,000  Source is Urinary  Sepsis protocol initiated  Fluid resuscitation ordered per protocol  IV antibiotics as appropriate for source of sepsis  While organ dysfunction may be noted elsewhere in this problem list or in the chart, degree of organ dysfunction does not meet CMS criteria for severe sepsis  She was started on ceftriaxone, she received fosphomycin x 1 as UA showed ESBL    COPD (chronic obstructive pulmonary disease) (HCC)- (present on admission)  Assessment & Plan  Not in acute exacerbation.  DuoNebs as needed.             I have performed a physical exam and reviewed and updated ROS and Plan today (11/1/2021). In review of yesterday's note (10/31/2021), there are no changes except as documented above.     VTE prophylaxis: therapeutic anticoagulation with xarelto

## 2021-11-03 PROBLEM — A41.9 SEPSIS (HCC): Status: RESOLVED | Noted: 2021-10-24 | Resolved: 2021-11-03

## 2021-11-03 PROCEDURE — 770001 HCHG ROOM/CARE - MED/SURG/GYN PRIV*

## 2021-11-03 PROCEDURE — A9270 NON-COVERED ITEM OR SERVICE: HCPCS | Performed by: STUDENT IN AN ORGANIZED HEALTH CARE EDUCATION/TRAINING PROGRAM

## 2021-11-03 PROCEDURE — 700102 HCHG RX REV CODE 250 W/ 637 OVERRIDE(OP): Performed by: STUDENT IN AN ORGANIZED HEALTH CARE EDUCATION/TRAINING PROGRAM

## 2021-11-03 PROCEDURE — A9270 NON-COVERED ITEM OR SERVICE: HCPCS | Performed by: INTERNAL MEDICINE

## 2021-11-03 PROCEDURE — 700102 HCHG RX REV CODE 250 W/ 637 OVERRIDE(OP): Performed by: HOSPITALIST

## 2021-11-03 PROCEDURE — 99231 SBSQ HOSP IP/OBS SF/LOW 25: CPT | Performed by: INTERNAL MEDICINE

## 2021-11-03 PROCEDURE — 97605 NEG PRS WND THER DME<=50SQCM: CPT

## 2021-11-03 PROCEDURE — A9270 NON-COVERED ITEM OR SERVICE: HCPCS | Performed by: HOSPITALIST

## 2021-11-03 PROCEDURE — 302098 PASTE RING (FLAT): Performed by: INTERNAL MEDICINE

## 2021-11-03 PROCEDURE — 700102 HCHG RX REV CODE 250 W/ 637 OVERRIDE(OP): Performed by: INTERNAL MEDICINE

## 2021-11-03 RX ORDER — LORAZEPAM 2 MG/ML
0.5 INJECTION INTRAMUSCULAR ONCE
Status: ACTIVE | OUTPATIENT
Start: 2021-11-03 | End: 2021-11-04

## 2021-11-03 RX ORDER — OXYCODONE HYDROCHLORIDE 5 MG/1
5 TABLET ORAL EVERY 6 HOURS PRN
Qty: 20 TABLET | Refills: 0 | Status: SHIPPED | OUTPATIENT
Start: 2021-11-03 | End: 2021-11-08

## 2021-11-03 RX ADMIN — ACETAMINOPHEN 650 MG: 325 TABLET ORAL at 08:04

## 2021-11-03 RX ADMIN — DOCUSATE SODIUM 50 MG AND SENNOSIDES 8.6 MG 2 TABLET: 8.6; 5 TABLET, FILM COATED ORAL at 16:15

## 2021-11-03 RX ADMIN — RIVAROXABAN 20 MG: 20 TABLET, FILM COATED ORAL at 16:15

## 2021-11-03 RX ADMIN — OXYCODONE HYDROCHLORIDE 10 MG: 10 TABLET ORAL at 12:08

## 2021-11-03 RX ADMIN — OXYCODONE 5 MG: 5 TABLET ORAL at 08:04

## 2021-11-03 RX ADMIN — METOPROLOL TARTRATE 25 MG: 25 TABLET, FILM COATED ORAL at 06:12

## 2021-11-03 RX ADMIN — METOPROLOL TARTRATE 25 MG: 25 TABLET, FILM COATED ORAL at 16:15

## 2021-11-03 RX ADMIN — LEVOTHYROXINE SODIUM 50 MCG: 0.05 TABLET ORAL at 06:13

## 2021-11-03 RX ADMIN — OXYCODONE HYDROCHLORIDE 10 MG: 10 TABLET ORAL at 14:01

## 2021-11-03 ASSESSMENT — PAIN DESCRIPTION - PAIN TYPE
TYPE: ACUTE PAIN

## 2021-11-03 NOTE — PROGRESS NOTES
Hospital Medicine Daily Progress Note    Date of Service  11/3/2021    Chief Complaint  dIalia HALE is a 81 y.o. female admitted 10/24/2021 with sepsis, ground-level fall, right acromial distal fracture, UTI    Hospital Course  This is a 81 -year-old female with a past medical significant for COPD not on home O2 , urinary incontinence, diabetes mellitus, prior VT, hypertension  A. fib, secondary hypercoagulable state, not on anticoagulation, lung cancer diagnosed in July 2021, chronic back wound with wound VAC in place, followed by Dr. Canales and wound care team was transferred from [facility] on 10/24/2021.    Patient reports having a ground-level fall, leading to distal clavicle fracture hypokalemia resume 2.6, septic with elevated WBC 11.9, tachycardic with heart rate of 116.    Unfortunately blood cultures were not obtained during the admission secondary to urinary tract infection.  Urine culture was obtained, patient was started on ceftriaxone.  Regarding her distal clavicle fracture, orthopedic surgery  Dr Whalen was consulted, recommended sling for comfort, pain control, recommended following up with repeat x-ray in 2 weeks.    Patient noted that patient was encephalopathic thought to be secondary to urinary tract infection.  Upon my evaluation today, she is noted to be alert and oriented 2-3; not able to answer complex questions.  PT/OT has evaluated the patient and recommend postacute.  Patient needs maximum assist at this time.  She is not safe to go home.    I called patient sister, she does for Dr. Chen of radiation oncology at [place], she has an MRI of the brain obtained 10/20/2021, requesting records.  Patient sister stated that he has been hospitalized at [place] and received 4 to 6 weeks of IV antibiotic for her back wound.  Wound care has been consulted during the stay in the hospital.  Blood culture has been ordered    Interval Problem Update  10/26:  No  acute events overnight, patient is alert and oriented x2-3.  Vital signs stable, currently saturating well on room air.  Blood culture has been ordered, urine culture growing greater than 100,000 ESBL E. coli, will provide fosfomycin  --Noted to be anemic with hemoglobin of 11.5, will transfuse if less than 7  PT/OT evaluated, recommend postacute.  Patient does not wanted to go to skilled nursing facility as he is max assist    Patient is alert and oriented x1-2, her mentation fluctuating, cognitive evaluation ordered.  Patient does not have POA for medical.    10/27:  --Patient continued to be confused elated x2.  I had an extensive discussion with the patient oldest daughter and patient sister, CODE STATUS has been changed to DNI/DN AR.  After next discussion with the patient sister/daughter, hospice referral has been placed.  --I also discussed the case with Dr. Fairbanks of radiation oncology, it is noted that patient has stage III to stage IV lung cancer which has been progressed since May.  Patient had not received any chemotherapy/radiation therapy.  Dr. Hoyt of oncology has evaluated the patient on May 2021 who stated that because of the patient's poor performance status she is not a candidate of chemotherapy.    --I discussed with radiation oncology who stated that because of the patient worsening mental status.  Patient is a candidate of hospice option extensive discussion of the patient/discussed-over the phone, decision was made to have a hospice referral.    10/28:  --No acute events overnight, laying in bed, denies any complaint, she continues to remain confused  --Hospice referral has been sent  Patient is medically stable to be discharged to home on home hospice    10/29:  --No acute events overnight, vital signs stable.  I called the patient's sister, who wanted her to be comfortable.  --She does have a wound in her back  She has been accepted by hospice and significant need for chemotherapy  considering patient poor performance status as per Medical oncology in 5/2021 and radiation oncology.  Patient cancer continue to grow during this.  Since diagnosis in May 2021.  --She is very frail debilitated    10/30:  --No acute events overnight, patient is alert and oriented x1-2.  Vital signs stable, provide boost 3 times daily.  Patient had an x-ray by hospice  -- She needs to be discharged on group home on home hospice    Medically cleared to be discharged to group home on Hospice    10/31:  --No acute events overnight, laying in bed continuing to complain.  Patient is alert and oriented x1, knows her name.  --Plan is to discharge the patient to group home on hospice, case management arranging group home  --Continues to remain , will continue ivf     11/01:  No acute events, laying in a bed Patient is alert and oriented x1.  Palliative following.  Plan is to start the patient to go to group home on hospice.  She has been accepted to continue to hospice, pending group on medication admitted to group home.   --Vital sign has bene stable     11/2- she is alert to herself and being in the hospital. She does not like to answer the questions. She is sad. She most likely does not want to go home. Either way her prognosis is poor and hospice is appropriate     11/3-no event overnight.  No changes in medical condition.  Patient continues to be confused and irritated if ask orientation questions.  Plan continue home hospice on a group home.    Consultants/Specialty  orthopedics    Code Status  DNAR/DNI    Disposition  Patient is  Medically cleared to be discharged to group home on hospice      I have placed the appropriate orders for post-discharge needs.    Review of Systems  Limited secondary to patient pain mentation    Physical Exam  Temp:  [36.4 °C (97.5 °F)-37.4 °C (99.3 °F)] 36.4 °C (97.5 °F)  Pulse:  [] 91  Resp:  [16-18] 18  BP: ()/(59-79) 109/61  SpO2:  [93 %-98 %] 93 %    Physical Exam  Vitals  and nursing note reviewed.   Constitutional:       Appearance: She is ill-appearing.      Comments:  Frail and awake    HENT:      Head: Normocephalic and atraumatic.   Eyes:      Extraocular Movements: Extraocular movements intact.   Cardiovascular:      Rate and Rhythm: Normal rate.      Heart sounds: Murmur heard.     Pulmonary:      Effort: Pulmonary effort is normal. No respiratory distress.      Breath sounds: No wheezing.   Abdominal:      General: There is no distension.      Palpations: Abdomen is soft.      Tenderness: There is no abdominal tenderness.      Comments: wound VAC in her back   Musculoskeletal:      Cervical back: Neck supple.      Comments: Wound vac on her back    Skin:     General: Skin is dry.      Coloration: Skin is not jaundiced.   Neurological:      Mental Status: She is alert.      Motor: No weakness.      Comments: Knows her name, doesn't remember where he sees me the president or why she has been here   Psychiatric:         Behavior: Behavior is uncooperative.         Fluids    Intake/Output Summary (Last 24 hours) at 11/3/2021 1453  Last data filed at 11/3/2021 1100  Gross per 24 hour   Intake 170 ml   Output 100 ml   Net 70 ml       Laboratory                        Imaging  DX-CHEST-LIMITED (1 VIEW)   Final Result      1.  Nodular opacity in the left lower lobe, possibly representing early pneumonia or atelectasis. Follow-up imaging is recommended to document resolution.      2.  Thickening of the right minor fissure, possibly related to scarring or atelectasis.      3.  Increased nodular densities projecting over the right lower lung, possibly external to the patient. CT of the chest may be helpful for further evaluation.      CT-HEAD W/O   Final Result      1.  Cerebral atrophy.      2.  White matter lucencies most consistent with small vessel ischemic change versus demyelination or gliosis.      3.  Otherwise, Head CT without contrast with no acute findings. No evidence of  acute cerebral infarction, hemorrhage or mass lesion.      DX-SHOULDER 2+ RIGHT   Final Result         Acute comminuted and mildly displaced fracture of the distal clavicle. No involvement of the AC joint.           Assessment/Plan  Advance care planning- (present on admission)  Assessment & Plan   I discussed the case with the patient's sister/patient Daughter over the phone .  I discussed advance care planning  For at least 35 minutes over the phone as patient daughter/sister could not be available face-to-face.,  I discussed with them including diagnosis, prognosis, plan of care, risks and benefits of any therapies that could be offered, as well as alternatives including palliation and hospice, as appropriate.  I placed a hospice referral  Changed CODE STATUS DNI/DN AR      Infection of lumbar spine (HCC)- (present on admission)  Assessment & Plan  Hx of    She has wound vac in place  Follows with Dr. Canales       Closed nondisplaced fracture of acromial end of right clavicle- (present on admission)  Assessment & Plan  Patient was transferred with a sling, will continue for support.  Pain control.  Right upper extremity neurovascularly intact, range of motion intact  No surgery per ortho   -- plan to dc on hospice    Encephalopathy- (present on admission)  Assessment & Plan  In setting of UTI and sepsis verss  --Patient mentation has not been cleared, MRI of the brain  Without contrast that was obtained at [place]': no obvious mets    Chronic pain syndrome- (present on admission)  Assessment & Plan  will hold opoid     Acquired hypothyroidism- (present on admission)  Assessment & Plan  Continue Synthroid 50 mcg daily    Paroxysmal atrial fibrillation (HCC)- (present on admission)  Assessment & Plan  Rate well controlled on metoprolol tartrate 25 mg p.o. twice daily, continue Xarelto for secondary hypercoagulable state    COPD (chronic obstructive pulmonary disease) (HCC)- (present on admission)  Assessment &  Plan  Not in acute exacerbation.  DuoNebs as needed.      Non-small cell lung cancer (NSCLC) (HCC)- (present on admission)  Assessment & Plan  Follows with radiation oncology Dr. Fairbanks at [facility]   Case has been  discussed with Dr. Fairbanks for radiation oncology.     ---Patient was evaluated by medical oncology on 5/2021, stated and not a candidate for chemotherapy due to patient performance status.  After discussing extensively with radiation oncology sister that patient is a candidate for hospice.  I discussed the case with patient's daughter/sister, hospice referral has been placed  CM updated, she has been accepted to hospice    Also, her daughter/sister stated that they cant take care of her full time; They stated that she will need to go to group home for hospice   Case management reaching out to leadership           I have performed a physical exam and reviewed and updated ROS and Plan today (11/3/2021). In review of yesterday's note (11/2/2021), there are no changes except as documented above.     VTE prophylaxis: therapeutic anticoagulation with xarelto

## 2021-11-03 NOTE — DOCUMENTATION QUERY
[facility]                                                                       Query Response Note      PATIENT:               KATHERINE HALE  ACCT #:                  [id number]  MRN:                     [id number]  :                      1940  ADMIT DATE:       10/24/2021 6:15 AM  DISCH DATE:          RESPONDING  PROVIDER #:        [id number]           QUERY TEXT:    Encephalopathy in setting of UTI and sepsis is documented in the H&P and progress notes. Please specify the type of encephalopathy.     NOTE:  If an appropriate response is not listed below, please respond with a new note.    The patient's Clinical Indicators include:  Encephalopathy In setting of UTI and sepsis  Sepsis - Source is Urinary  Urine Culture: Klebsiella pneumoniae ESBL >100,000   Chronic pain syndrome   Methadone morphine   Oxycodone  Lung cancer     Thank you,  Coleen Hernandez RN,CCS  Clinical Documentation Integrity  Connect via Vo[employer] Messenger  Options provided:   -- Septic encephalopathy   -- Metabolic encephalopathy   -- Due to medications/drugs   -- Unable to determine      Query created by: Coleen Hernandez on 10/26/2021 11:18 AM    RESPONSE TEXT:    Unable to determine          Electronically signed by:  BALJIT MEYERS MD 2021 5:47 PM

## 2021-11-03 NOTE — CARE PLAN
Problem: Knowledge Deficit - Standard  Goal: Patient and family/care givers will demonstrate understanding of plan of care, disease process/condition, diagnostic tests and medications  Outcome: Not Progressing     Problem: Pain - Standard  Goal: Alleviation of pain or a reduction in pain to the patient’s comfort goal  Outcome: Progressing   The patient is Unstable - High likelihood or risk of patient condition declining or worsening    Shift Goals  Clinical Goals: safety  Patient Goals: unable to define by pt  Family Goals: N/A    Progress made toward(s) clinical / shift goals:  Pt pain controlled with Oxycodone.  Pt not able to ask for it unless offered.    Patient is not progressing towards the following goals: Pt very confused and needs to have regular reorientation of situation, place, and time.       Problem: Knowledge Deficit - Standard  Goal: Patient and family/care givers will demonstrate understanding of plan of care, disease process/condition, diagnostic tests and medications  Outcome: Not Progressing

## 2021-11-03 NOTE — DISCHARGE PLANNING
Anticipated Discharge Disposition: Hospice with group home on JASMY[facility].     Action: Patient previously accepted by [facility]. Patient requires group home as she is not safe to discharge home even with four hours of private pay caregivers.     LSW spoke with patient's daughter, Tiffany (#[phone]) who confirmed that family is able to contribute $1,000 a month for group home placement.      LSW completed JASMYNE and sent to CM leadership for their review. JASMYNE pending CM leadership approval.     Barriers to Discharge: GH placement; requires JASMYNE; family to determine if they can assist in cost of care.     Plan: CM to continue to follow for discharge needs.

## 2021-11-03 NOTE — DISCHARGE INSTRUCTIONS
Discharge Instructions    Discharged to group home by medical transportation with escort. Discharged via wheelchair, hospital escort: Yes.  Special equipment needed: Not Applicable    Be sure to schedule a follow-up appointment with your primary care doctor or any specialists as instructed.     Discharge Plan:        I understand that a diet low in cholesterol, fat, and sodium is recommended for good health. Unless I have been given specific instructions below for another diet, I accept this instruction as my diet prescription.   Other diet: Regular    Special Instructions:   Hospice  Hospice is a service that is designed to provide people who are terminally ill and their families with medical, spiritual, and psychological support. Its aim is to improve your quality of life by keeping you as comfortable as possible in the final stages of life.  Who will be my providers when I begin hospice care?  Hospice teams often include:  · A nurse.  · A doctor. The hospice doctor will be available for your care, but you can include your regular doctor or nurse practitioner.  · A .  · A counselor.  · A  (such as a ).  · A dietitian.  · Therapists.  · Trained volunteers who can help with care.  What services does hospice provide?  Hospice services can vary depending on the center or organization. Generally, they include:  · Ways to keep you comfortable, such as:  ? Providing care in your home or in a home-like setting.  ? Working with your family and friends to help meet your needs.  ? Allowing you to enjoy the support of loved ones by receiving much of your basic care from family and friends.  · Pain relief and symptom management. The staff will supply all necessary medicines and equipment so that you can stay comfortable and alert enough to enjoy the company of your friends and family.  · Visits or care from a nurse and doctor. This may include 24-hour on-call services.  · Companionship when  you are alone.  · Allowing you and your family to rest. Hospice staff may do light housekeeping, prepare meals, and run errands.  · Counseling. They will make sure your emotional, spiritual, and social needs are being met, as well as those needs of your family members.  · Spiritual care. This will be individualized to meet your needs and your family's needs. It may involve:  ? Helping you and your family understand the dying process.  ? Helping you say goodbye to your family and friends.  ? Performing a specific Sabianist ceremony or ritual.  · Massage.  · Nutrition therapy.  · Physical and occupational therapy.  · Short-term inpatient care, if something cannot be managed in the home.  · Art or music therapy.  · Bereavement support for grieving family members.  When should hospice care begin?  Most people who use hospice are believed to have less than 6 months to live.  · Your family and health care providers can help you decide when hospice services should begin.  · If you live longer than 6 months but your condition does not improve, your doctor may be able to approve you for continued hospice care.  · If your condition improves, you may discontinue the program.  What should I consider before selecting a program?  Most hospice programs are run by nonprofit, independent organizations. Some are affiliated with hospitals, nursing homes, or home health care agencies. Hospice programs can take place in your home or at a hospice center, hospital, or skilled nursing facility. When choosing a hospice program, ask the following questions:  · What services are available to me?  · What services will be offered to my loved ones?  · How involved will my loved ones be?  · How involved will my health care provider be?  · Who makes up the hospice care team? How are they trained or screened?  · How will my pain and symptoms be managed?  · If my circumstances change, can the services be provided in a different setting, such as my  home or in the hospital?  · Is the program reviewed and licensed by the state or certified in some other way?  · What does it cost? Is it covered by insurance?  · If I choose a hospice center or nursing home, where is the hospice center located? Is it convenient for family and friends?  · If I choose a hospice center or nursing home, can my family and friends visit any time?  · Will you provide emotional and spiritual support?  · Who can my family call with questions?  Where can I learn more about hospice?  You can learn about existing hospice programs in your area from your health care providers. You can also read more about hospice online. The websites of the following organizations have helpful information:  · National Hospice and Palliative Care Organization (NHPCO): www.nhpco.org  · National Association for Home Care & Hospice (NAHC): www.nahc.org  · Hospice Foundation of [place] (HFA): www.hospicefoundation.org  · American Cancer Society (ACS): www.cancer.org  · Hospice Net: www.hospicenet.org  · Visiting Nurse Associations of [place] (VNAA): www.vnaa.org  You may also find more information by contacting the following agencies:  · A local agency on aging.  · Your local [facility] chapter.  · Your state's department of health or .  Summary  · Hospice is a service that is designed to provide people who are terminally ill and their families with medical, spiritual, and psychological support.  · Hospice aims to improve your quality of life by keeping you as comfortable as possible in the final stages of life.  · Hospice teams often include a doctor, nurse, , counselor, ,dietitian, therapists, and volunteers.  · Hospice care generally includes medicine for symptom management, visits from doctors and nurses, physical and occupational therapy, nutrition counseling, spiritual and emotional counseling, caregiver support, and bereavement support for grieving family  members.  · Hospice programs can take place in your home or at a hospice center, hospital, or skilled nursing facility.  This information is not intended to replace advice given to you by your health care provider. Make sure you discuss any questions you have with your health care provider.  Document Released: 04/05/2005 Document Revised: 11/30/2018 Document Reviewed: 01/09/2018  [employer] Patient Education © 2020 [employer]  Urinary Tract Infection, Adult  A urinary tract infection (UTI) is an infection of any part of the urinary tract. The urinary tract includes:  · The kidneys.  · The ureters.  · The bladder.  · The urethra.  These organs make, store, and get rid of pee (urine) in the body.  What are the causes?  This is caused by germs (bacteria) in your genital area. These germs grow and cause swelling (inflammation) of your urinary tract.  What increases the risk?  You are more likely to develop this condition if:  · You have a small, thin tube (catheter) to drain pee.  · You cannot control when you pee or poop (incontinence).  · You are female, and:  ? You use these methods to prevent pregnancy:  ? A medicine that kills sperm (spermicide).  ? A device that blocks sperm (diaphragm).  ? You have low levels of a female hormone (estrogen).  ? You are pregnant.  · You have genes that add to your risk.  · You are sexually active.  · You take antibiotic medicines.  · You have trouble peeing because of:  ? A prostate that is bigger than normal, if you are male.  ? A blockage in the part of your body that drains pee from the bladder (urethra).  ? A kidney stone.  ? A nerve condition that affects your bladder (neurogenic bladder).  ? Not getting enough to drink.  ? Not peeing often enough.  · You have other conditions, such as:  ? Diabetes.  ? A weak disease-fighting system (immune system).  ? Sickle cell disease.  ? Gout.  ? Injury of the spine.  What are the signs or symptoms?  Symptoms of this condition  include:  · Needing to pee right away (urgently).  · Peeing often.  · Peeing small amounts often.  · Pain or burning when peeing.  · Blood in the pee.  · Pee that smells bad or not like normal.  · Trouble peeing.  · Pee that is cloudy.  · Fluid coming from the vagina, if you are female.  · Pain in the belly or lower back.  Other symptoms include:  · Throwing up (vomiting).  · No urge to eat.  · Feeling mixed up (confused).  · Being tired and grouchy (irritable).  · A fever.  · Watery poop (diarrhea).  How is this treated?  This condition may be treated with:  · Antibiotic medicine.  · Other medicines.  · Drinking enough water.  Follow these instructions at home:    Medicines  · Take over-the-counter and prescription medicines only as told by your doctor.  · If you were prescribed an antibiotic medicine, take it as told by your doctor. Do not stop taking it even if you start to feel better.  General instructions  · Make sure you:  ? Pee until your bladder is empty.  ? Do not hold pee for a long time.  ? Empty your bladder after sex.  ? Wipe from front to back after pooping if you are a female. Use each tissue one time when you wipe.  · Drink enough fluid to keep your pee pale yellow.  · Keep all follow-up visits as told by your doctor. This is important.  Contact a doctor if:  · You do not get better after 1-2 days.  · Your symptoms go away and then come back.  Get help right away if:  · You have very bad back pain.  · You have very bad pain in your lower belly.  · You have a fever.  · You are sick to your stomach (nauseous).  · You are throwing up.  Summary  · A urinary tract infection (UTI) is an infection of any part of the urinary tract.  · This condition is caused by germs in your genital area.  · There are many risk factors for a UTI. These include having a small, thin tube to drain pee and not being able to control when you pee or poop.  · Treatment includes antibiotic medicines for germs.  · Drink enough  fluid to keep your pee pale yellow.  This information is not intended to replace advice given to you by your health care provider. Make sure you discuss any questions you have with your health care provider.  Document Released: 06/05/2009 Document Revised: 12/05/2019 Document Reviewed: 06/27/2019  [employer] Patient Education © 2020 [employer]  Clavicle Fracture    A clavicle fracture is a broken collarbone. The collarbone is the long bone that connects your shoulder to your chest wall. A broken collarbone may be treated with a sling or with surgery. Treatment depends on whether the broken ends of the bone are out of place or not.  Follow these instructions at home:  If you have a sling:  · Wear the sling as told by your doctor. Take it off only as told by your doctor.  · Loosen the sling if your fingers tingle, become numb, or turn cold and blue.  · Do not lift your arm. Keep it across your chest.  · Keep the sling clean.  · Ask your doctor if you may take off the sling for bathing.  ? If your sling is not waterproof, do not let it get wet. Cover the sling with a watertight covering if you take a bath or a shower while wearing it.  ? If you may take off your sling when you take a bath or a shower, keep your shoulder in the same position as when the sling is on.  Managing pain, stiffness, and swelling    · If told, put ice on the injured area:  ? If you have a removable sling, take it off as told by your doctor.  ? Put ice in a plastic bag.  ? Place a towel between your skin and the bag.  ? Leave the ice on for 20 minutes, 2-3 times a day.  Activity  · Avoid activities that make your symptoms worse for 4-6 weeks, or as long as told.  · Ask your doctor when it is safe for you to drive.  · Do exercises as told by your doctor.  General instructions  · Do not use any products that contain nicotine or tobacco, such as cigarettes and e-cigarettes. These can delay bone healing. If you need help quitting, ask your  doctor.  · Take over-the-counter and prescription medicines only as told by your doctor.  · Keep all follow-up visits as told by your doctor. This is important.  Contact a doctor if:  · Your medicine is not making you feel less pain.  · Your medicine is not making swelling better.  Get help right away if:  · Your cannot feel your arm (your arm is numb).  · Your arm is cold.  · Your arm is a lighter color than normal.  Summary  · A clavicle fracture is a broken collarbone. The collarbone is the long bone that connects your shoulder to your chest wall.  · Treatment depends on whether the broken ends of the bone are out of place or not.  · If you have a sling, wear it as told by your doctor.  · Do exercises when your doctor says you can. The exercises will help your arm get strong and move like it used to.  This information is not intended to replace advice given to you by your health care provider. Make sure you discuss any questions you have with your health care provider.  Document Released: 06/05/2009 Document Revised: 11/30/2018 Document Reviewed: 11/06/2017  [employer] Patient Education © 2020 [employer]    · Is patient discharged on Warfarin / Coumadin?   No   Depression / Suicide Risk    As you are discharged from this [facility] facility, it is important to learn how to keep safe from harming yourself.    Recognize the warning signs:  · Abrupt changes in personality, positive or negative- including increase in energy   · Giving away possessions  · Change in eating patterns- significant weight changes-  positive or negative  · Change in sleeping patterns- unable to sleep or sleeping all the time   · Unwillingness or inability to communicate  · Depression  · Unusual sadness, discouragement and loneliness  · Talk of wanting to die  · Neglect of personal appearance   · Rebelliousness- reckless behavior  · Withdrawal from people/activities they love  · Confusion- inability to concentrate     If you or a loved  one observes any of these behaviors or has concerns about self-harm, here's what you can do:  · Talk about it- your feelings and reasons for harming yourself  · Remove any means that you might use to hurt yourself (examples: pills, rope, extension cords, firearm)  · Get professional help from the community (Mental Health, Substance Abuse, psychological counseling)  · Do not be alone:Call your Safe Contact- someone whom you trust who will be there for you.  · Call your local CRISIS HOTLINE 199-4539 or 495-243-7214  · Call your local Children's Mobile Crisis Response Team [place] (787) 504-9582 or www.[employer]  · Call the toll free National Suicide Prevention Hotlines   · National Suicide Prevention Lifeline 718-335-IWZQ (6015)  · National Hope Line Network 800-SUICIDE (293-3772)

## 2021-11-03 NOTE — PROGRESS NOTES
Hospital Medicine Daily Progress Note    Date of Service  11/2/2021    Chief Complaint  Idalia HALE is a 81 y.o. female admitted 10/24/2021 with sepsis, ground-level fall, right acromial distal fracture, UTI    Hospital Course  This is a 81 -year-old female with a past medical significant for COPD not on home O2 , urinary incontinence, diabetes mellitus, prior VT, hypertension  A. fib, secondary hypercoagulable state, not on anticoagulation, lung cancer diagnosed in July 2021, chronic back wound with wound VAC in place, followed by Dr. Canales and wound care team was transferred from [facility] on 10/24/2021.    Patient reports having a ground-level fall, leading to distal clavicle fracture hypokalemia resume 2.6, septic with elevated WBC 11.9, tachycardic with heart rate of 116.    Unfortunately blood cultures were not obtained during the admission secondary to urinary tract infection.  Urine culture was obtained, patient was started on ceftriaxone.  Regarding her distal clavicle fracture, orthopedic surgery  Dr Whalen was consulted, recommended sling for comfort, pain control, recommended following up with repeat x-ray in 2 weeks.    Patient noted that patient was encephalopathic thought to be secondary to urinary tract infection.  Upon my evaluation today, she is noted to be alert and oriented 2-3; not able to answer complex questions.  PT/OT has evaluated the patient and recommend postacute.  Patient needs maximum assist at this time.  She is not safe to go home.    I called patient sister, she does for Dr. Chen of radiation oncology at [place], she has an MRI of the brain obtained 10/20/2021, requesting records.  Patient sister stated that he has been hospitalized at [place] and received 4 to 6 weeks of IV antibiotic for her back wound.  Wound care has been consulted during the stay in the hospital.  Blood culture has been ordered    Interval Problem Update  10/26:  No  acute events overnight, patient is alert and oriented x2-3.  Vital signs stable, currently saturating well on room air.  Blood culture has been ordered, urine culture growing greater than 100,000 ESBL E. coli, will provide fosfomycin  --Noted to be anemic with hemoglobin of 11.5, will transfuse if less than 7  PT/OT evaluated, recommend postacute.  Patient does not wanted to go to skilled nursing facility as he is max assist    Patient is alert and oriented x1-2, her mentation fluctuating, cognitive evaluation ordered.  Patient does not have POA for medical.    10/27:  --Patient continued to be confused elated x2.  I had an extensive discussion with the patient oldest daughter and patient sister, CODE STATUS has been changed to DNI/DN AR.  After next discussion with the patient sister/daughter, hospice referral has been placed.  --I also discussed the case with Dr. Fairbanks of radiation oncology, it is noted that patient has stage III to stage IV lung cancer which has been progressed since May.  Patient had not received any chemotherapy/radiation therapy.  Dr. Hoyt of oncology has evaluated the patient on May 2021 who stated that because of the patient's poor performance status she is not a candidate of chemotherapy.    --I discussed with radiation oncology who stated that because of the patient worsening mental status.  Patient is a candidate of hospice option extensive discussion of the patient/discussed-over the phone, decision was made to have a hospice referral.    10/28:  --No acute events overnight, laying in bed, denies any complaint, she continues to remain confused  --Hospice referral has been sent  Patient is medically stable to be discharged to home on home hospice    10/29:  --No acute events overnight, vital signs stable.  I called the patient's sister, who wanted her to be comfortable.  --She does have a wound in her back  She has been accepted by hospice and significant need for chemotherapy  considering patient poor performance status as per Medical oncology in 5/2021 and radiation oncology.  Patient cancer continue to grow during this.  Since diagnosis in May 2021.  --She is very frail debilitated    10/30:  --No acute events overnight, patient is alert and oriented x1-2.  Vital signs stable, provide boost 3 times daily.  Patient had an x-ray by hospice  -- She needs to be discharged on group home on home hospice    Medically cleared to be discharged to group home on Hospice    10/31:  --No acute events overnight, laying in bed continuing to complain.  Patient is alert and oriented x1, knows her name.  --Plan is to discharge the patient to group home on hospice, case management arranging group home  --Continues to remain , will continue ivf     11/01:  No acute events, laying in a bed Patient is alert and oriented x1.  Palliative following.  Plan is to start the patient to go to group home on hospice.  She has been accepted to continue to hospice, pending group on medication admitted to group home.   --Vital sign has bene stable     11/2- she is alert to herself and being in the hospital. She does not like to answer the questions. She is sad. She most likely does not want to go home. Either way her prognosis is poor and hospice is appropriate     Consultants/Specialty  orthopedics    Code Status  DNAR/DNI    Disposition  Patient is  Medically cleared to be discharged to group home on hospice      I have placed the appropriate orders for post-discharge needs.    Review of Systems  Limited secondary to patient pain mentation    Physical Exam  Temp:  [36.4 °C (97.6 °F)-37.1 °C (98.8 °F)] 36.9 °C (98.5 °F)  Pulse:  [] 117  Resp:  [16-18] 18  BP: ()/(50-64) 96/59  SpO2:  [95 %-98 %] 98 %    Physical Exam  Vitals and nursing note reviewed.   Constitutional:       Appearance: She is ill-appearing.      Comments:  Frail and awake    HENT:      Head: Normocephalic and atraumatic.   Eyes:       Extraocular Movements: Extraocular movements intact.   Cardiovascular:      Rate and Rhythm: Normal rate.      Heart sounds: Murmur heard.     Pulmonary:      Effort: Pulmonary effort is normal. No respiratory distress.      Breath sounds: No wheezing.   Abdominal:      General: There is no distension.      Palpations: Abdomen is soft.      Tenderness: There is no abdominal tenderness.      Comments: wound VAC in her back   Musculoskeletal:      Cervical back: Neck supple.      Comments: Wound vac on her back    Skin:     General: Skin is dry.      Coloration: Skin is not jaundiced.   Neurological:      Mental Status: She is alert.      Motor: No weakness.      Comments: Knows her name, doesn't remember where he sees me the president or why she has been here   Psychiatric:         Behavior: Behavior is uncooperative.         Fluids    Intake/Output Summary (Last 24 hours) at 11/2/2021 1743  Last data filed at 11/2/2021 1619  Gross per 24 hour   Intake 120 ml   Output 100 ml   Net 20 ml       Laboratory                        Imaging  DX-CHEST-LIMITED (1 VIEW)   Final Result      1.  Nodular opacity in the left lower lobe, possibly representing early pneumonia or atelectasis. Follow-up imaging is recommended to document resolution.      2.  Thickening of the right minor fissure, possibly related to scarring or atelectasis.      3.  Increased nodular densities projecting over the right lower lung, possibly external to the patient. CT of the chest may be helpful for further evaluation.      CT-HEAD W/O   Final Result      1.  Cerebral atrophy.      2.  White matter lucencies most consistent with small vessel ischemic change versus demyelination or gliosis.      3.  Otherwise, Head CT without contrast with no acute findings. No evidence of acute cerebral infarction, hemorrhage or mass lesion.      DX-SHOULDER 2+ RIGHT   Final Result         Acute comminuted and mildly displaced fracture of the distal clavicle. No  involvement of the AC joint.           Assessment/Plan  Advance care planning  Assessment & Plan   I discussed the case with the patient's sister/patient Daughter over the phone .  I discussed advance care planning  For at least 35 minutes over the phone as patient daughter/sister could not be available face-to-face.,  I discussed with them including diagnosis, prognosis, plan of care, risks and benefits of any therapies that could be offered, as well as alternatives including palliation and hospice, as appropriate.  I placed a hospice referral  Changed CODE STATUS DNI/DN AR      Infection of lumbar spine (HCC)- (present on admission)  Assessment & Plan  Hx of    She has wound vac in place  Follows with Dr. Canales       Closed nondisplaced fracture of acromial end of right clavicle- (present on admission)  Assessment & Plan  Patient was transferred with a sling, will continue for support.  Pain control.  Right upper extremity neurovascularly intact, range of motion intact  No surgery per ortho   -- plan to dc on hospice    Encephalopathy- (present on admission)  Assessment & Plan  In setting of UTI and sepsis verss  --Patient mentation has not been cleared, MRI of the brain  Without contrast that was obtained at [facility]': no obvious mets    Chronic pain syndrome- (present on admission)  Assessment & Plan  will hold opoid     Acquired hypothyroidism- (present on admission)  Assessment & Plan  Continue Synthroid 50 mcg daily    Paroxysmal atrial fibrillation (HCC)- (present on admission)  Assessment & Plan  Rate well controlled on metoprolol tartrate 25 mg p.o. twice daily, continue Xarelto for secondary hypercoagulable state    Sepsis (HCC)- (present on admission)  Assessment & Plan  Resolved    This is Sepsis Present on admission  SIRS criteria identified on my evaluation include: Tachycardia, with heart rate greater than 90 BPM, Tachypnea, with respirations greater than 20 per minute and Leukocytosis, with WBC  greater than 12,000  Source is Urinary  Sepsis protocol initiated  Fluid resuscitation ordered per protocol  IV antibiotics as appropriate for source of sepsis  While organ dysfunction may be noted elsewhere in this problem list or in the chart, degree of organ dysfunction does not meet CMS criteria for severe sepsis  She was started on ceftriaxone, she received fosphomycin x 1 as UA showed ESBL    COPD (chronic obstructive pulmonary disease) (HCC)- (present on admission)  Assessment & Plan  Not in acute exacerbation.  DuoNebs as needed.      Non-small cell lung cancer (NSCLC) (HCC)- (present on admission)  Assessment & Plan  Follows with radiation oncology Dr. Fairbanks at [facility]   Case has been  discussed with Dr. Fairbanks for radiation oncology.     ---Patient was evaluated by medical oncology on 5/2021, stated and not a candidate for chemotherapy due to patient performance status.  After discussing extensively with radiation oncology sister that patient is a candidate for hospice.  I discussed the case with patient's daughter/sister, hospice referral has been placed  CM updated, she has been accepted to hospice    Also, her daughter/sister stated that they cant take care of her full time; They stated that she will need to go to group home for hospice   Case management reaching out to leadership           I have performed a physical exam and reviewed and updated ROS and Plan today (11/2/2021). In review of yesterday's note (11/1/2021), there are no changes except as documented above.     VTE prophylaxis: therapeutic anticoagulation with xarelto

## 2021-11-03 NOTE — WOUND TEAM
[facility]  Inpatient Services  Wound and Skin Care Progress Note    Admission Date: 10/24/2021     Last order of IP CONSULT TO WOUND CARE was found on 10/24/2021 from Hospital Encounter on 10/24/2021     HPI, PMH, SH: Reviewed    Past Surgical History:   Procedure Laterality Date   • WIDE EXCISION  8/7/2009    Performed by AMADO HALL at [facility]   • WIDE EXCISION  9/18/08    Performed by GERRY CASTILLO at [facility]   • FLAP CLOSURE  9/18/08    Performed by GERRY CASTILLO at [facility]   • MASS EXCISION GENERAL  2008    left upper arm  (cancerous )   • CHOLECYSTECTOMY  2000    laparoscopically    • BIOPSY BREAST  1990    right breast biopsy  (benign)   • FOOT SURGERY  1990    mass removed from right foot   • HYSTERECTOMY RADICAL  1974    abdominal      Social History     Tobacco Use   • Smoking status: Former Smoker     Types: Cigarettes   • Smokeless tobacco: Never Used   • Tobacco comment: Quit in 1989   Substance Use Topics   • Alcohol use: No     No chief complaint on file.    Diagnosis: Sepsis (HCC) [A41.9]    Unit where seen by Wound Team: T312/02     WOUND CONSULT/FOLLOW UP RELATED TO:  Sacrum, right heel and mid-back NPWT     WOUND HISTORY:  Patient was unable to provide history on back.  Patient from outside facility    WOUND ASSESSMENT/LDA     Negative Pressure Wound Therapy Back Mid (Active)   NPWT Pump Mode / Pressure Setting Ulta;Continuous;125 mmHg 11/03/21 1230   Dressing Type Small 11/03/21 1230   Number of Foam Pieces Used 2 11/03/21 1230   Canister Changed No 11/03/21 1230   Output (mL) 100 mL 11/02/21 1619   NEXT Dressing Change/Treatment Date 11/05/21 11/03/21 1230   WOUND NURSE ONLY - Time Spent with Patient (mins) 45 11/03/21 1230     Wound 10/25/21 Full Thickness Wound Back Mid (Active)   Wound Image    11/01/21 1000   Site Assessment Pale;Kempner 11/03/21 1230   Periwound Assessment Clean;Dry;Intact;Scar tissue 11/03/21 1230    Margins Defined edges;Unattached edges 11/03/21 1230   Closure Secondary intention 11/03/21 1230   Drainage Amount Small 11/03/21 1230   Drainage Description Serosanguineous 11/03/21 1230   Treatments Cleansed;Irrigation;Site care;Offloading 11/03/21 1230   Wound Cleansing Approved Wound Cleanser 11/03/21 1230   Periwound Protectant Skin Protectant Wipes to Periwound;Drape 11/03/21 1230   Dressing Cleansing/Solutions Not Applicable 11/03/21 1230   Dressing Options Wound Vac 11/03/21 1230   Dressing Changed Changed 11/03/21 1230   Dressing Status Clean;Dry;Intact 11/03/21 1230   Dressing Change/Treatment Frequency Monday, Wednesday, Friday, and As Needed 11/03/21 1230   NEXT Dressing Change/Treatment Date 11/05/21 11/03/21 1230   NEXT Weekly Photo (Inpatient Only) 11/08/21 11/01/21 1000   Non-staged Wound Description Full thickness 11/01/21 1000   Wound Length (cm) 7 cm 11/01/21 1000   Wound Width (cm) 2.2 cm 11/01/21 1000   Wound Depth (cm) 2.5 cm 11/01/21 1000   Wound Surface Area (cm^2) 15.4 cm^2 11/01/21 1000   Wound Volume (cm^3) 38.5 cm^3 11/01/21 1000   Wound Healing % -282 11/01/21 1000   Shape oval 11/03/21 1230   Wound Odor None 11/03/21 1230   Exposed Structures IZABEL;None 11/03/21 1230   WOUND NURSE ONLY - Time Spent with Patient (mins) 45 11/03/21 1230          Vascular:    GOPAL:   No results found.    Lab Values:    Lab Results   Component Value Date/Time    WBC 6.5 10/30/2021 06:04 AM    RBC 3.90 (L) 10/30/2021 06:04 AM    HEMOGLOBIN 10.9 (L) 10/30/2021 06:04 AM    HEMATOCRIT 35.6 (L) 10/30/2021 06:04 AM        Culture Results show:  No results found for this or any previous visit (from the past 720 hour(s)).    Pain Level/Medicated:  Pre-medicated       INTERVENTIONS BY WOUND TEAM:  Performed standard wound care which includes appropriate positioning, dressing removal and non-selective debridement. Pictures and measurements obtained weekly if/when required.  Preparation for Dressing removal: Dressing  soaked with wound cleanser   Non-selectively Debrided with:  wound cleanser and gauze.  Sharp debridement: NA  Michaela wound: Cleansed with wound cleanser and gauze, Prepped with no sting skin prep, and drape  Primary Dressing: Total 2 black foam to wound bed bridged to R lateral gluteal area over soft tissue avoiding bony areas of the pelvis and sealed with drape.  Secondary (Outer) Dressing: mepilex to offload tubing and TRAC pad.    Interdisciplinary consultation: Patient, Bedside RN     EVALUATION / RATIONALE FOR TREATMENT:  Most Recent Date:  11/03/21: silver nitrate to wound bed to hypergranular tissue again.  Continue with NPWT at this time.  Patient possibly going hospice.  11/01/21: Silver nitrate to help with hypergranular tissue, collagen to wound base and UM on proximal end.  Continue with NPWT and offloading.   10/29/21 granular tissue is dull pink with irregular surface.  Added collagen to enhance cellular activity.    10/27/21: Mid back wound with persistent hypergranulation although a little less than last treatment. Patient was having significant pain, but denied it in her wound and mostly when moving her. Did not assess sacrum today. Dressing CDI.   10/25/21: mid-back wound has hypergranulation, silver nitrate applied to hypergranulation to help possibly flatten down the growth.  Possibly need provider debridement to remove all hypergranulation.  NPWT continued to help with drainage.  Sacrum is a POA DTI, offloaded with sacral mepilex. Right heel is callus and dry skin, no wounds.      Goals: Steady decrease in wound area and depth weekly.    WOUND TEAM PLAN OF CARE ([X] for frequency of wound follow up,):   Nursing to follow orders written for wound care. Contact wound team if area fails to progress, deteriorates or with any questions/concerns  Dressing changes by wound team:                   Follow up 3 times weekly:                NPWT change 3 times weekly:  X,  Mid-back  Follow up 1-2 times  weekly:      Follow up Bi-Monthly:                   Follow up as needed:   sacrum  Other (explain):     NURSING PLAN OF CARE ORDERS (X):  Dressing changes: See Dressing Care orders: x  Skin care: See Skin Care orders:   RN Prevention Protocol: x  Rectal tube care: See Rectal Tube Care orders:   Other orders:    Anticipated discharge plans:   LTACH:        SNF/Rehab:  x                Home Health Care:           Outpatient Wound Center:            Self/Family Care:        Other:                  Vac Discharge Needs:   Not Applicable Pt not on a wound vac:       Regular Vac while inpatient, alternative dressing at DC:        Regular Vac in use and continued at DC:          x  Reg. Vac w/ Skin Sub/Biologic in use. Will need to be changed 2x wkly:      Veraflo Vac while inpatient, ok to transition to Regular Vac on Discharge:           Veraflo Vac while inpatient, will need to remain on Veraflo Vac upon discharge:

## 2021-11-03 NOTE — PROGRESS NOTES
Assessment complete, pt A&Ox1, to first name. Tele sitter in place.    Pt tachycardic to 120s. Pt on on 3L NC O2>90.     Pt moaning, grimacing. Hypotensive.     LAP belt in place, bed locked in lowest position, bed alarm on, call bell within reach.     Pt has wound vac to midline low back, dressing CDI.    Sacrum intact, offloading, pt turns self from side to side.    + void, LBM 11/2. x1 with FWW to commode.    Covid 19 surge in effect.

## 2021-11-04 PROCEDURE — A9270 NON-COVERED ITEM OR SERVICE: HCPCS | Performed by: INTERNAL MEDICINE

## 2021-11-04 PROCEDURE — 36415 COLL VENOUS BLD VENIPUNCTURE: CPT

## 2021-11-04 PROCEDURE — 97535 SELF CARE MNGMENT TRAINING: CPT

## 2021-11-04 PROCEDURE — 86480 TB TEST CELL IMMUN MEASURE: CPT

## 2021-11-04 PROCEDURE — A9270 NON-COVERED ITEM OR SERVICE: HCPCS | Performed by: STUDENT IN AN ORGANIZED HEALTH CARE EDUCATION/TRAINING PROGRAM

## 2021-11-04 PROCEDURE — 770001 HCHG ROOM/CARE - MED/SURG/GYN PRIV*

## 2021-11-04 PROCEDURE — 99232 SBSQ HOSP IP/OBS MODERATE 35: CPT | Performed by: INTERNAL MEDICINE

## 2021-11-04 PROCEDURE — 700102 HCHG RX REV CODE 250 W/ 637 OVERRIDE(OP): Performed by: STUDENT IN AN ORGANIZED HEALTH CARE EDUCATION/TRAINING PROGRAM

## 2021-11-04 PROCEDURE — 700102 HCHG RX REV CODE 250 W/ 637 OVERRIDE(OP): Performed by: INTERNAL MEDICINE

## 2021-11-04 RX ADMIN — OXYCODONE 5 MG: 5 TABLET ORAL at 13:18

## 2021-11-04 RX ADMIN — METOPROLOL TARTRATE 25 MG: 25 TABLET, FILM COATED ORAL at 06:38

## 2021-11-04 RX ADMIN — OXYCODONE 5 MG: 5 TABLET ORAL at 21:07

## 2021-11-04 RX ADMIN — DOCUSATE SODIUM 50 MG AND SENNOSIDES 8.6 MG 2 TABLET: 8.6; 5 TABLET, FILM COATED ORAL at 06:38

## 2021-11-04 RX ADMIN — LEVOTHYROXINE SODIUM 50 MCG: 0.05 TABLET ORAL at 06:38

## 2021-11-04 RX ADMIN — METOPROLOL TARTRATE 25 MG: 25 TABLET, FILM COATED ORAL at 21:07

## 2021-11-04 ASSESSMENT — COGNITIVE AND FUNCTIONAL STATUS - GENERAL
TOILETING: A LOT
HELP NEEDED FOR BATHING: A LOT
DRESSING REGULAR UPPER BODY CLOTHING: A LOT
PERSONAL GROOMING: A LOT
EATING MEALS: A LITTLE
DRESSING REGULAR LOWER BODY CLOTHING: A LOT
SUGGESTED CMS G CODE MODIFIER DAILY ACTIVITY: CL
DAILY ACTIVITIY SCORE: 13

## 2021-11-04 ASSESSMENT — PAIN DESCRIPTION - PAIN TYPE
TYPE: ACUTE PAIN

## 2021-11-04 NOTE — PALLIATIVE CARE
PALLIATIVE CARE FOLLOW-UP:    Met with pt's sister Jl as scheduled. Provided validation and support for family's decision-making. POLST completed for DNR, comfort treatment and no artificial nutrition, signed by Jl as healthcare surrogate and MD, scanned to epic, original to go with pt at d/c - placed at [facility].    Suggested Jl phone [facility] and provided # so that any additionally paperwork can be completed.    Discussed with/Updated: BREONNA Lundberg         Plan:  Palliative Care to continue to follow, provide support, and help facilitate decision-making as needed.    Thank you for allowing Palliative Care to support this pt and their family.  Contact [phone] for additional assistance, patient status change, questions or concerns.

## 2021-11-04 NOTE — DISCHARGE PLANNING
Anticipated Discharge Disposition: Hospice with group home on JASMYNE.     Action: LSW received signed JASMYNE from Titus from [facility] [employer]. LSW met with patient's sister, Jl, at bedside who signed JASMYNE. Family agreeing to contribute $1,000 a month for group home placement. Form provided to CM leadership and signed by CM director Coty. Copy of signed form provided to sisterJl, and formed faxed back to Titus at #[phone]. NIAW spoke with Titus from [facility] (#[phone]) who confirmed that patient will be accepted to the [facility] located on [address] [place] [address]. Patient will require a quant gold and covid test.    BISHOP spoke with April from [facility] who confirmed that consent forms have been signed by family and that they would begin ordering DME.     LSW left message with patient's daughter, Tiffany (#[phone]), to provide update.     Barriers to Discharge:  placement; quant gold and covid test results; delivery of DME.     Plan: CM to continue to follow for discharge needs.

## 2021-11-04 NOTE — CARE PLAN
The patient is Stable - Low risk of patient condition declining or worsening    Shift Goals  Clinical Goals: Safety  Patient Goals: Comfort  Family Goals: N/A    Progress made toward(s) clinical / shift goals:    Problem: Pain - Standard  Goal: Alleviation of pain or a reduction in pain to the patient’s comfort goal  Outcome: Progressing     Patient denies at this time. Safety measurements in place. Fall precautions active. Tele sitter present. Call light in reach. Hourly rounding in place.    Patient is not progressing towards the following goals:

## 2021-11-04 NOTE — CARE PLAN
Problem: Nutritional:  Goal: Achieve adequate nutritional intake  Description: Patient will consume 25-50% of meals/supplements  Outcome: Discharged - Not Met     Pt continues to have poor PO intake. Per palliative note, POLST completed for DNR, comfort treatment and no artificial nutrition. Modifications have also been made to treatment plan to encourage comfort such as removal of wound vac and discontinuation of some medications that do not have benefit. Pt is pending group home placement with hospice. RD will follow weekly or PRN.

## 2021-11-04 NOTE — THERAPY
"Occupational Therapy  Daily Treatment     Patient Name: Idalia Freitas  Age:  81 y.o., Sex:  female  Medical Record #: [id number]  Today's Date: 11/4/2021     Precautions: Fall Risk  Comments: R clavicle fx, no WB limitations    Assessment    Pt seen for OT tx, agreeable to sit EOB in prep to eat lunch. Pt assisted into sitting EOB, became emotional in response to phlebotomist arrival to draw blood. Pt refused attempt to stand and also refused sitting up to eat following supported sit at EOB while blood was drawn. Pt new learning carryover is impaired by baseline memory/cognitive deficits. Per chart the plan is for pt to DC to a group home, anticipate no additional acute OT needs as pt will DC to group home on hospice soon. Patient will not be actively followed for occupational therapy services at this time, however may be seen if requested by physician for 1 more visit within 30 days to address any discharge or equipment needs.     Plan    Discharge secondary to no likely benefit.    DC Equipment Recommendations: Unable to determine at this time  Discharge Recommendations: Recommend post-acute placement for additional occupational therapy services prior to discharge home    Subjective    \"I need to eat.\"     Objective     11/04/21 1535   Cognition    Level of Consciousness Alert   Safety Awareness Impaired   New Learning Impaired   Attention Impaired   Sequencing Impaired   Balance   Weight Shift Sitting Poor   Skilled Intervention Verbal Cuing;Tactile Cuing;Sequencing;Postural Facilitation;Facilitation   Comments refused standing   Bed Mobility    Supine to Sit Moderate Assist   Sit to Supine Minimal Assist   Scooting Moderate Assist   Skilled Intervention Verbal Cuing;Tactile Cuing;Facilitation;Postural Facilitation;Sequencing   Activities of Daily Living   Eating Minimal Assist   Grooming Moderate Assist;Seated   Upper Body Dressing Moderate Assist   Lower Body Dressing Maximal Assist   Toileting Maximal " Assist   Skilled Intervention Verbal Cuing;Tactile Cuing;Facilitation;Compensatory Strategies   Comments poor new learning and carryover d/t baseline cognitive deficits   Functional Mobility   Sit to Stand Refused   Bed, Chair, Wheelchair Transfer Refused   Toilet Transfers Refused   Mobility sat EOB only   Skilled Intervention Verbal Cuing;Tactile Cuing;Sequencing;Facilitation;Postural Facilitation   Short Term Goals   Short Term Goal # 1 Pt will be Supervised for ADL transfers   Goal Outcome # 1 Goal not met   Short Term Goal # 2 Pt will sit up in chair for 2 meals/day   Goal Outcome # 2 Goal not met   Short Term Goal # 3 Pt will groom seated with supervision   Goal Outcome # 3 Goal not met   Education Group   Education Provided Role of Occupational Therapist;Activities of Daily Living;Transfers

## 2021-11-04 NOTE — PROGRESS NOTES
Hospital Medicine Daily Progress Note    Date of Service  11/4/2021    Chief Complaint  Idalia HALE is a 81 y.o. female admitted 10/24/2021 with sepsis, ground-level fall, right acromial distal fracture, UTI    Hospital Course  This is a 81 -year-old female with a past medical significant for COPD not on home O2 , urinary incontinence, diabetes mellitus, prior VT, hypertension  A. fib, secondary hypercoagulable state, not on anticoagulation, lung cancer diagnosed in July 2021, chronic back wound with wound VAC in place, followed by Dr. Canales and wound care team was transferred from [facility] on 10/24/2021.    Patient reports having a ground-level fall, leading to distal clavicle fracture hypokalemia resume 2.6, septic with elevated WBC 11.9, tachycardic with heart rate of 116.    Unfortunately blood cultures were not obtained during the admission secondary to urinary tract infection.  Urine culture was obtained, patient was started on ceftriaxone.  Regarding her distal clavicle fracture, orthopedic surgery  Dr Whalen was consulted, recommended sling for comfort, pain control, recommended following up with repeat x-ray in 2 weeks.    Patient noted that patient was encephalopathic thought to be secondary to urinary tract infection.  Upon my evaluation today, she is noted to be alert and oriented 2-3; not able to answer complex questions.  PT/OT has evaluated the patient and recommend postacute.  Patient needs maximum assist at this time.  She is not safe to go home.    I called patient sister, she does for Dr. Chen of radiation oncology at [facility], she has an MRI of the brain obtained 10/20/2021, requesting records.  Patient sister stated that he has been hospitalized at [facility] and received 4 to 6 weeks of IV antibiotic for her back wound.  Wound care has been consulted during the stay in the hospital.  Blood culture has been ordered    Interval Problem Update  10/26:  No  acute events overnight, patient is alert and oriented x2-3.  Vital signs stable, currently saturating well on room air.  Blood culture has been ordered, urine culture growing greater than 100,000 ESBL E. coli, will provide fosfomycin  --Noted to be anemic with hemoglobin of 11.5, will transfuse if less than 7  PT/OT evaluated, recommend postacute.  Patient does not wanted to go to skilled nursing facility as he is max assist    Patient is alert and oriented x1-2, her mentation fluctuating, cognitive evaluation ordered.  Patient does not have POA for medical.    10/27:  --Patient continued to be confused elated x2.  I had an extensive discussion with the patient oldest daughter and patient sister, CODE STATUS has been changed to DNI/DN AR.  After next discussion with the patient sister/daughter, hospice referral has been placed.  --I also discussed the case with Dr. Fairbanks of radiation oncology, it is noted that patient has stage III to stage IV lung cancer which has been progressed since May.  Patient had not received any chemotherapy/radiation therapy.  Dr. Hoyt of oncology has evaluated the patient on May 2021 who stated that because of the patient's poor performance status she is not a candidate of chemotherapy.    --I discussed with radiation oncology who stated that because of the patient worsening mental status.  Patient is a candidate of hospice option extensive discussion of the patient/discussed-over the phone, decision was made to have a hospice referral.    10/28:  --No acute events overnight, laying in bed, denies any complaint, she continues to remain confused  --Hospice referral has been sent  Patient is medically stable to be discharged to home on home hospice    10/29:  --No acute events overnight, vital signs stable.  I called the patient's sister, who wanted her to be comfortable.  --She does have a wound in her back  She has been accepted by hospice and significant need for chemotherapy  considering patient poor performance status as per Medical oncology in 5/2021 and radiation oncology.  Patient cancer continue to grow during this.  Since diagnosis in May 2021.  --She is very frail debilitated    10/30:  --No acute events overnight, patient is alert and oriented x1-2.  Vital signs stable, provide boost 3 times daily.  Patient had an x-ray by hospice  -- She needs to be discharged on group home on home hospice    Medically cleared to be discharged to group home on Hospice    10/31:  --No acute events overnight, laying in bed continuing to complain.  Patient is alert and oriented x1, knows her name.  --Plan is to discharge the patient to group home on hospice, case management arranging group home  --Continues to remain , will continue ivf     11/01:  No acute events, laying in a bed Patient is alert and oriented x1.  Palliative following.  Plan is to start the patient to go to group home on hospice.  She has been accepted to continue to hospice, pending group on medication admitted to group home.   --Vital sign has bene stable     11/2- she is alert to herself and being in the hospital. She does not like to answer the questions. She is sad. She most likely does not want to go home. Either way her prognosis is poor and hospice is appropriate     11/3-no event overnight.  No changes in medical condition.  Patient continues to be confused and irritated if ask orientation questions.  Plan continue home hospice on a group home.    11/4- little more awake, eating slight better. Sister is present. Talked to her and she is ok to remove wound vac. It is bothering to pt. I will discontinue medications that has no benefits, and that is what sister agreed that is appropriate. Pending group home placement     Consultants/Specialty  orthopedics    Code Status  DNAR/DNI    Disposition  Patient is  Medically cleared to be discharged to group home on hospice      I have placed the appropriate orders for  post-discharge needs.    Review of Systems  Limited secondary to patient pain mentation    Physical Exam  Temp:  [36.1 °C (96.9 °F)-37 °C (98.6 °F)] 36.9 °C (98.4 °F)  Pulse:  [76-95] 95  Resp:  [16-19] 18  BP: (101-106)/(52-65) 101/65  SpO2:  [96 %-100 %] 100 %    Physical Exam  Vitals and nursing note reviewed.   Constitutional:       Appearance: She is ill-appearing.      Comments:  Frail and awake    HENT:      Head: Normocephalic and atraumatic.   Eyes:      Extraocular Movements: Extraocular movements intact.   Cardiovascular:      Rate and Rhythm: Normal rate.      Heart sounds: Murmur heard.     Pulmonary:      Effort: Pulmonary effort is normal. No respiratory distress.      Breath sounds: No wheezing.   Abdominal:      General: There is no distension.      Palpations: Abdomen is soft.      Tenderness: There is no abdominal tenderness.      Comments: wound VAC in her back   Musculoskeletal:      Cervical back: Neck supple.      Comments: Wound vac on her back    Skin:     General: Skin is dry.      Coloration: Skin is not jaundiced.   Neurological:      Mental Status: She is alert.      Motor: No weakness.      Comments: Knows her name, doesn't remember where he sees me the president or why she has been here   Psychiatric:         Behavior: Behavior is uncooperative.         Fluids  No intake or output data in the 24 hours ending 11/04/21 1208    Laboratory                        Imaging  DX-CHEST-LIMITED (1 VIEW)   Final Result      1.  Nodular opacity in the left lower lobe, possibly representing early pneumonia or atelectasis. Follow-up imaging is recommended to document resolution.      2.  Thickening of the right minor fissure, possibly related to scarring or atelectasis.      3.  Increased nodular densities projecting over the right lower lung, possibly external to the patient. CT of the chest may be helpful for further evaluation.      CT-HEAD W/O   Final Result      1.  Cerebral atrophy.      2.   White matter lucencies most consistent with small vessel ischemic change versus demyelination or gliosis.      3.  Otherwise, Head CT without contrast with no acute findings. No evidence of acute cerebral infarction, hemorrhage or mass lesion.      DX-SHOULDER 2+ RIGHT   Final Result         Acute comminuted and mildly displaced fracture of the distal clavicle. No involvement of the AC joint.           Assessment/Plan  Advance care planning- (present on admission)  Assessment & Plan   I discussed the case with the patient's sister/patient Daughter over the phone .  I discussed advance care planning  For at least 35 minutes over the phone as patient daughter/sister could not be available face-to-face.,  I discussed with them including diagnosis, prognosis, plan of care, risks and benefits of any therapies that could be offered, as well as alternatives including palliation and hospice, as appropriate.  I placed a hospice referral  Changed CODE STATUS DNI/DN AR      Infection of lumbar spine (HCC)- (present on admission)  Assessment & Plan  Hx of    She has wound vac in place. 11/4- discuss with sister. Wound vac very uncomfortable to pt. Remove wound vac. Pt going for hospice, no reversible condition   Follows with Dr. Canales       Closed nondisplaced fracture of acromial end of right clavicle- (present on admission)  Assessment & Plan  Patient was transferred with a sling, will continue for support.  Pain control.  Right upper extremity neurovascularly intact, range of motion intact  No surgery per ortho   -- plan to dc on hospice    Encephalopathy- (present on admission)  Assessment & Plan  In setting of UTI and sepsis verss  --Patient mentation has not been cleared, MRI of the brain  Without contrast that was obtained at [facility]': no obvious mets  11/4- this is he new baseline. Pt going for hospice, poor prognosis     Chronic pain syndrome- (present on admission)  Assessment & Plan  will hold opoid     Acquired  hypothyroidism- (present on admission)  Assessment & Plan  Continue Synthroid 50 mcg daily    Paroxysmal atrial fibrillation (HCC)- (present on admission)  Assessment & Plan  Rate well controlled on metoprolol tartrate 25 mg p.o. twice daily, continue Xarelto for now  To consider to stop xarelto later     COPD (chronic obstructive pulmonary disease) (HCC)- (present on admission)  Assessment & Plan  Not in acute exacerbation.  DuoNebs as needed.      Non-small cell lung cancer (NSCLC) (HCC)- (present on admission)  Assessment & Plan  Follows with radiation oncology Dr. Fairbanks at [facility]   Case has been  discussed with Dr. Fairbanks for radiation oncology.     ---Patient was evaluated by medical oncology on 5/2021, stated and not a candidate for chemotherapy due to patient performance status.  After discussing extensively with radiation oncology sister that patient is a candidate for hospice.  I discussed the case with patient's daughter/sister, hospice referral has been placed  CM updated, she has been accepted to hospice    Also, her daughter/sister stated that they cant take care of her full time; They stated that she will need to go to group home for hospice   Case management reaching out to leadership           I have performed a physical exam and reviewed and updated ROS and Plan today (11/4/2021). In review of yesterday's note (11/3/2021), there are no changes except as documented above.     VTE prophylaxis: therapeutic anticoagulation with xarelto

## 2021-11-05 LAB
SARS-COV+SARS-COV-2 AG RESP QL IA.RAPID: NOTDETECTED
SPECIMEN SOURCE: NORMAL

## 2021-11-05 PROCEDURE — 700102 HCHG RX REV CODE 250 W/ 637 OVERRIDE(OP): Performed by: INTERNAL MEDICINE

## 2021-11-05 PROCEDURE — 99231 SBSQ HOSP IP/OBS SF/LOW 25: CPT | Performed by: INTERNAL MEDICINE

## 2021-11-05 PROCEDURE — 302098 PASTE RING (FLAT): Performed by: INTERNAL MEDICINE

## 2021-11-05 PROCEDURE — A9270 NON-COVERED ITEM OR SERVICE: HCPCS | Performed by: INTERNAL MEDICINE

## 2021-11-05 PROCEDURE — A9270 NON-COVERED ITEM OR SERVICE: HCPCS | Performed by: STUDENT IN AN ORGANIZED HEALTH CARE EDUCATION/TRAINING PROGRAM

## 2021-11-05 PROCEDURE — 770001 HCHG ROOM/CARE - MED/SURG/GYN PRIV*

## 2021-11-05 PROCEDURE — 700102 HCHG RX REV CODE 250 W/ 637 OVERRIDE(OP): Performed by: STUDENT IN AN ORGANIZED HEALTH CARE EDUCATION/TRAINING PROGRAM

## 2021-11-05 PROCEDURE — 87426 SARSCOV CORONAVIRUS AG IA: CPT

## 2021-11-05 RX ADMIN — METOPROLOL TARTRATE 25 MG: 25 TABLET, FILM COATED ORAL at 17:20

## 2021-11-05 RX ADMIN — RIVAROXABAN 20 MG: 20 TABLET, FILM COATED ORAL at 17:20

## 2021-11-05 RX ADMIN — OXYCODONE 5 MG: 5 TABLET ORAL at 10:26

## 2021-11-05 RX ADMIN — LEVOTHYROXINE SODIUM 50 MCG: 0.05 TABLET ORAL at 06:21

## 2021-11-05 RX ADMIN — DOCUSATE SODIUM 50 MG AND SENNOSIDES 8.6 MG 2 TABLET: 8.6; 5 TABLET, FILM COATED ORAL at 17:20

## 2021-11-05 RX ADMIN — OXYCODONE 5 MG: 5 TABLET ORAL at 01:40

## 2021-11-05 RX ADMIN — METOPROLOL TARTRATE 25 MG: 25 TABLET, FILM COATED ORAL at 06:21

## 2021-11-05 RX ADMIN — OXYCODONE 5 MG: 5 TABLET ORAL at 14:23

## 2021-11-05 RX ADMIN — DOCUSATE SODIUM 50 MG AND SENNOSIDES 8.6 MG 2 TABLET: 8.6; 5 TABLET, FILM COATED ORAL at 06:21

## 2021-11-05 ASSESSMENT — PAIN DESCRIPTION - PAIN TYPE
TYPE: ACUTE PAIN

## 2021-11-05 NOTE — CARE PLAN
Problem: Knowledge Deficit - Standard  Goal: Patient and family/care givers will demonstrate understanding of plan of care, disease process/condition, diagnostic tests and medications  Outcome: Progressing     Problem: Pain - Standard  Goal: Alleviation of pain or a reduction in pain to the patient’s comfort goal  Outcome: Progressing   The patient is Stable - Low risk of patient condition declining or worsening    Shift Goals  Clinical Goals: safety   Patient Goals: comfort  Family Goals: N/A    Progress made toward(s) clinical / shift goals:  Patient fearful and confused- stating she is in pain, comforted and given pain medication. Educated not to get up on her own and to call for assistance    Patient is not progressing towards the following goals:    Assumed care of patient at change of shift.   Report received at bedside rounding  Patient is calm, in bed, with no distress noted.  Bed alarm on   Call light and patient belongings are in reach, bed is locked and in lowest position- hourly rounding is in place. See flow sheets for further assessment.    Tele sitter at bedside

## 2021-11-05 NOTE — CARE PLAN
The patient is Watcher - Medium risk of patient condition declining or worsening    Shift Goals  Clinical Goals: Safety  Patient Goals: Comfort  Family Goals: N/A    Progress made toward(s) clinical / shift goals:    Problem: Knowledge Deficit - Standard  Goal: Patient and family/care givers will demonstrate understanding of plan of care, disease process/condition, diagnostic tests and medications  Outcome: Progressing     Problem: Pain - Standard  Goal: Alleviation of pain or a reduction in pain to the patient’s comfort goal  Outcome: Progressing       Patient is not progressing towards the following goals:

## 2021-11-05 NOTE — PROGRESS NOTES
Hospital Medicine Daily Progress Note    Date of Service  11/5/2021    Chief Complaint  Idalia HALE is a 81 y.o. female admitted 10/24/2021 with sepsis, ground-level fall, right acromial distal fracture, UTI    Hospital Course  This is a 81 -year-old female with a past medical significant for COPD not on home O2 , urinary incontinence, diabetes mellitus, prior VT, hypertension  A. fib, secondary hypercoagulable state, not on anticoagulation, lung cancer diagnosed in July 2021, chronic back wound with wound VAC in place, followed by Dr. Canales and wound care team was transferred from [facility] on 10/24/2021.    Patient reports having a ground-level fall, leading to distal clavicle fracture hypokalemia resume 2.6, septic with elevated WBC 11.9, tachycardic with heart rate of 116.    Unfortunately blood cultures were not obtained during the admission secondary to urinary tract infection.  Urine culture was obtained, patient was started on ceftriaxone.  Regarding her distal clavicle fracture, orthopedic surgery  Dr Whalen was consulted, recommended sling for comfort, pain control, recommended following up with repeat x-ray in 2 weeks.    Patient noted that patient was encephalopathic thought to be secondary to urinary tract infection.  Upon my evaluation today, she is noted to be alert and oriented 2-3; not able to answer complex questions.  PT/OT has evaluated the patient and recommend postacute.  Patient needs maximum assist at this time.  She is not safe to go home.    I called patient sister, she does for Dr. Chen of radiation oncology at [facility], she has an MRI of the brain obtained 10/20/2021, requesting records.  Patient sister stated that he has been hospitalized at [facility] and received 4 to 6 weeks of IV antibiotic for her back wound.  Wound care has been consulted during the stay in the hospital.  Blood culture has been ordered    Interval Problem Update  10/26:  No  acute events overnight, patient is alert and oriented x2-3.  Vital signs stable, currently saturating well on room air.  Blood culture has been ordered, urine culture growing greater than 100,000 ESBL E. coli, will provide fosfomycin  --Noted to be anemic with hemoglobin of 11.5, will transfuse if less than 7  PT/OT evaluated, recommend postacute.  Patient does not wanted to go to skilled nursing facility as he is max assist    Patient is alert and oriented x1-2, her mentation fluctuating, cognitive evaluation ordered.  Patient does not have POA for medical.    10/27:  --Patient continued to be confused elated x2.  I had an extensive discussion with the patient oldest daughter and patient sister, CODE STATUS has been changed to DNI/DN AR.  After next discussion with the patient sister/daughter, hospice referral has been placed.  --I also discussed the case with Dr. Fairbanks of radiation oncology, it is noted that patient has stage III to stage IV lung cancer which has been progressed since May.  Patient had not received any chemotherapy/radiation therapy.  Dr. Hoyt of oncology has evaluated the patient on May 2021 who stated that because of the patient's poor performance status she is not a candidate of chemotherapy.    --I discussed with radiation oncology who stated that because of the patient worsening mental status.  Patient is a candidate of hospice option extensive discussion of the patient/discussed-over the phone, decision was made to have a hospice referral.    10/28:  --No acute events overnight, laying in bed, denies any complaint, she continues to remain confused  --Hospice referral has been sent  Patient is medically stable to be discharged to home on home hospice    10/29:  --No acute events overnight, vital signs stable.  I called the patient's sister, who wanted her to be comfortable.  --She does have a wound in her back  She has been accepted by hospice and significant need for chemotherapy  considering patient poor performance status as per Medical oncology in 5/2021 and radiation oncology.  Patient cancer continue to grow during this.  Since diagnosis in May 2021.  --She is very frail debilitated    10/30:  --No acute events overnight, patient is alert and oriented x1-2.  Vital signs stable, provide boost 3 times daily.  Patient had an x-ray by hospice  -- She needs to be discharged on group home on home hospice    Medically cleared to be discharged to group home on Hospice    10/31:  --No acute events overnight, laying in bed continuing to complain.  Patient is alert and oriented x1, knows her name.  --Plan is to discharge the patient to group home on hospice, case management arranging group home  --Continues to remain , will continue ivf     11/01:  No acute events, laying in a bed Patient is alert and oriented x1.  Palliative following.  Plan is to start the patient to go to group home on hospice.  She has been accepted to continue to hospice, pending group on medication admitted to group home.   --Vital sign has bene stable     11/2- she is alert to herself and being in the hospital. She does not like to answer the questions. She is sad. She most likely does not want to go home. Either way her prognosis is poor and hospice is appropriate     11/3-no event overnight.  No changes in medical condition.  Patient continues to be confused and irritated if ask orientation questions.  Plan continue home hospice on a group home.    11/4- little more awake, eating slight better. Sister is present. Talked to her and she is ok to remove wound vac. It is bothering to pt. I will discontinue medications that has no benefits, and that is what sister agreed that is appropriate. Pending group home placement     11/5- no event. No changes. Denies pain, pending placement. covid test ordered     Consultants/Specialty  orthopedics    Code Status  DNAR/DNI    Disposition  Patient is  Medically cleared to be discharged  to group home on hospice      I have placed the appropriate orders for post-discharge needs.    Review of Systems  Limited secondary to patient pain mentation    Physical Exam  Temp:  [35.8 °C (96.5 °F)-37.2 °C (98.9 °F)] 36.8 °C (98.2 °F)  Pulse:  [] 91  Resp:  [16-18] 16  BP: (100-118)/(60-76) 103/66  SpO2:  [93 %-99 %] 99 %    Physical Exam  Vitals and nursing note reviewed.   Constitutional:       Appearance: She is ill-appearing.      Comments:  Frail and awake    HENT:      Head: Normocephalic and atraumatic.   Eyes:      Extraocular Movements: Extraocular movements intact.   Cardiovascular:      Rate and Rhythm: Normal rate.      Heart sounds: Murmur heard.     Pulmonary:      Effort: Pulmonary effort is normal. No respiratory distress.      Breath sounds: No wheezing.   Abdominal:      General: There is no distension.      Palpations: Abdomen is soft.      Tenderness: There is no abdominal tenderness.      Comments: wound VAC in her back   Musculoskeletal:      Cervical back: Neck supple.      Comments: Wound vac on her back    Skin:     General: Skin is dry.      Coloration: Skin is not jaundiced.   Neurological:      Mental Status: She is alert.      Motor: No weakness.      Comments: Knows her name, doesn't remember where he sees me the president or why she has been here   Psychiatric:         Behavior: Behavior is uncooperative.         Fluids  No intake or output data in the 24 hours ending 11/05/21 1509    Laboratory                        Imaging  DX-CHEST-LIMITED (1 VIEW)   Final Result      1.  Nodular opacity in the left lower lobe, possibly representing early pneumonia or atelectasis. Follow-up imaging is recommended to document resolution.      2.  Thickening of the right minor fissure, possibly related to scarring or atelectasis.      3.  Increased nodular densities projecting over the right lower lung, possibly external to the patient. CT of the chest may be helpful for further  evaluation.      CT-HEAD W/O   Final Result      1.  Cerebral atrophy.      2.  White matter lucencies most consistent with small vessel ischemic change versus demyelination or gliosis.      3.  Otherwise, Head CT without contrast with no acute findings. No evidence of acute cerebral infarction, hemorrhage or mass lesion.      DX-SHOULDER 2+ RIGHT   Final Result         Acute comminuted and mildly displaced fracture of the distal clavicle. No involvement of the AC joint.           Assessment/Plan  Advance care planning- (present on admission)  Assessment & Plan   I discussed the case with the patient's sister/patient Daughter over the phone .  I discussed advance care planning  For at least 35 minutes over the phone as patient daughter/sister could not be available face-to-face.,  I discussed with them including diagnosis, prognosis, plan of care, risks and benefits of any therapies that could be offered, as well as alternatives including palliation and hospice, as appropriate.  I placed a hospice referral  Changed CODE STATUS DNI/DN AR      Infection of lumbar spine (HCC)- (present on admission)  Assessment & Plan  Hx of    She has wound vac in place. 11/4- discuss with sister. Wound vac very uncomfortable to pt. Remove wound vac. Pt going for hospice, no reversible condition   Follows with Dr. Canales       Closed nondisplaced fracture of acromial end of right clavicle- (present on admission)  Assessment & Plan  Patient was transferred with a sling, will continue for support.  Pain control.  Right upper extremity neurovascularly intact, range of motion intact  No surgery per ortho   -- plan to dc on hospice    Encephalopathy- (present on admission)  Assessment & Plan  In setting of UTI and sepsis verss  --Patient mentation has not been cleared, MRI of the brain  Without contrast that was obtained at [facility]': no obvious mets  11/4- this is he new baseline. Pt going for hospice, poor prognosis     Chronic pain  syndrome- (present on admission)  Assessment & Plan  will hold opoid     Acquired hypothyroidism- (present on admission)  Assessment & Plan  Continue Synthroid 50 mcg daily    Paroxysmal atrial fibrillation (HCC)- (present on admission)  Assessment & Plan  Rate well controlled on metoprolol tartrate 25 mg p.o. twice daily, continue Xarelto for now  To consider to stop xarelto later     COPD (chronic obstructive pulmonary disease) (HCC)- (present on admission)  Assessment & Plan  Not in acute exacerbation.  DuoNebs as needed.      Non-small cell lung cancer (NSCLC) (HCC)- (present on admission)  Assessment & Plan  Follows with radiation oncology Dr. Fairbanks at [facility]   Case has been  discussed with Dr. Fairbanks for radiation oncology.     ---Patient was evaluated by medical oncology on 5/2021, stated and not a candidate for chemotherapy due to patient performance status.  After discussing extensively with radiation oncology sister that patient is a candidate for hospice.  I discussed the case with patient's daughter/sister, hospice referral has been placed  CM updated, she has been accepted to hospice    Also, her daughter/sister stated that they cant take care of her full time; They stated that she will need to go to group home for hospice   Case management reaching out to leadership           I have performed a physical exam and reviewed and updated ROS and Plan today (11/5/2021). In review of yesterday's note (11/4/2021), there are no changes except as documented above.     VTE prophylaxis: therapeutic anticoagulation with xarelto

## 2021-11-05 NOTE — DISCHARGE PLANNING
Anticipated Discharge Disposition: Hospice with group home on [facility].     Action: LSW spoke with April from [facility] who confirmed that DME is scheduled to be delivered to [facility] ([address] [place] [address]) today. Titus from  requesting discharge at the earliest on Saturday.    Quant gold and covid test results remains pending.    LSW met with patient's sister, Jl, at bedside to provide update.     Barriers to Discharge:  placement; quant gold and covid test results; delivery of DME.     Plan: CM to continue to follow for discharge needs.

## 2021-11-06 PROCEDURE — A9270 NON-COVERED ITEM OR SERVICE: HCPCS | Performed by: INTERNAL MEDICINE

## 2021-11-06 PROCEDURE — 770001 HCHG ROOM/CARE - MED/SURG/GYN PRIV*

## 2021-11-06 PROCEDURE — A9270 NON-COVERED ITEM OR SERVICE: HCPCS | Performed by: STUDENT IN AN ORGANIZED HEALTH CARE EDUCATION/TRAINING PROGRAM

## 2021-11-06 PROCEDURE — 99231 SBSQ HOSP IP/OBS SF/LOW 25: CPT | Performed by: INTERNAL MEDICINE

## 2021-11-06 PROCEDURE — 700102 HCHG RX REV CODE 250 W/ 637 OVERRIDE(OP): Performed by: STUDENT IN AN ORGANIZED HEALTH CARE EDUCATION/TRAINING PROGRAM

## 2021-11-06 PROCEDURE — 700102 HCHG RX REV CODE 250 W/ 637 OVERRIDE(OP): Performed by: INTERNAL MEDICINE

## 2021-11-06 RX ADMIN — METOPROLOL TARTRATE 25 MG: 25 TABLET, FILM COATED ORAL at 04:53

## 2021-11-06 RX ADMIN — OXYCODONE HYDROCHLORIDE 10 MG: 10 TABLET ORAL at 23:16

## 2021-11-06 RX ADMIN — OXYCODONE HYDROCHLORIDE 10 MG: 10 TABLET ORAL at 09:43

## 2021-11-06 RX ADMIN — LEVOTHYROXINE SODIUM 50 MCG: 0.05 TABLET ORAL at 04:53

## 2021-11-06 RX ADMIN — OXYCODONE HYDROCHLORIDE 10 MG: 10 TABLET ORAL at 19:39

## 2021-11-06 RX ADMIN — OXYCODONE 5 MG: 5 TABLET ORAL at 00:21

## 2021-11-06 RX ADMIN — METOPROLOL TARTRATE 25 MG: 25 TABLET, FILM COATED ORAL at 18:23

## 2021-11-06 RX ADMIN — OXYCODONE HYDROCHLORIDE 10 MG: 10 TABLET ORAL at 14:40

## 2021-11-06 ASSESSMENT — PAIN DESCRIPTION - PAIN TYPE
TYPE: ACUTE PAIN

## 2021-11-06 NOTE — PROGRESS NOTES
Hospital Medicine Daily Progress Note    Date of Service  11/6/2021    Chief Complaint  Idalia HALE is a 81 y.o. female admitted 10/24/2021 with sepsis, ground-level fall, right acromial distal fracture, UTI    Hospital Course  This is a 81 -year-old female with a past medical significant for COPD not on home O2 , urinary incontinence, diabetes mellitus, prior VT, hypertension  A. fib, secondary hypercoagulable state, not on anticoagulation, lung cancer diagnosed in July 2021, chronic back wound with wound VAC in place, followed by Dr. Canales and wound care team was transferred from [facility] on 10/24/2021.    Patient reports having a ground-level fall, leading to distal clavicle fracture hypokalemia resume 2.6, septic with elevated WBC 11.9, tachycardic with heart rate of 116.    Unfortunately blood cultures were not obtained during the admission secondary to urinary tract infection.  Urine culture was obtained, patient was started on ceftriaxone.  Regarding her distal clavicle fracture, orthopedic surgery  Dr Whalen was consulted, recommended sling for comfort, pain control, recommended following up with repeat x-ray in 2 weeks.    Patient noted that patient was encephalopathic thought to be secondary to urinary tract infection.  Upon my evaluation today, she is noted to be alert and oriented 2-3; not able to answer complex questions.  PT/OT has evaluated the patient and recommend postacute.  Patient needs maximum assist at this time.  She is not safe to go home.    I called patient sister, she does for Dr. Chen of radiation oncology at [facility], she has an MRI of the brain obtained 10/20/2021, requesting records.  Patient sister stated that he has been hospitalized at [facility] and received 4 to 6 weeks of IV antibiotic for her back wound.  Wound care has been consulted during the stay in the hospital.  Blood culture has been ordered    Interval Problem Update  10/26:  No  acute events overnight, patient is alert and oriented x2-3.  Vital signs stable, currently saturating well on room air.  Blood culture has been ordered, urine culture growing greater than 100,000 ESBL E. coli, will provide fosfomycin  --Noted to be anemic with hemoglobin of 11.5, will transfuse if less than 7  PT/OT evaluated, recommend postacute.  Patient does not wanted to go to skilled nursing facility as he is max assist    Patient is alert and oriented x1-2, her mentation fluctuating, cognitive evaluation ordered.  Patient does not have POA for medical.    10/27:  --Patient continued to be confused elated x2.  I had an extensive discussion with the patient oldest daughter and patient sister, CODE STATUS has been changed to DNI/DN AR.  After next discussion with the patient sister/daughter, hospice referral has been placed.  --I also discussed the case with Dr. Fairbanks of radiation oncology, it is noted that patient has stage III to stage IV lung cancer which has been progressed since May.  Patient had not received any chemotherapy/radiation therapy.  Dr. Hoyt of oncology has evaluated the patient on May 2021 who stated that because of the patient's poor performance status she is not a candidate of chemotherapy.    --I discussed with radiation oncology who stated that because of the patient worsening mental status.  Patient is a candidate of hospice option extensive discussion of the patient/discussed-over the phone, decision was made to have a hospice referral.    10/28:  --No acute events overnight, laying in bed, denies any complaint, she continues to remain confused  --Hospice referral has been sent  Patient is medically stable to be discharged to home on home hospice    10/29:  --No acute events overnight, vital signs stable.  I called the patient's sister, who wanted her to be comfortable.  --She does have a wound in her back  She has been accepted by hospice and significant need for chemotherapy  considering patient poor performance status as per Medical oncology in 5/2021 and radiation oncology.  Patient cancer continue to grow during this.  Since diagnosis in May 2021.  --She is very frail debilitated    10/30:  --No acute events overnight, patient is alert and oriented x1-2.  Vital signs stable, provide boost 3 times daily.  Patient had an x-ray by hospice  -- She needs to be discharged on group home on home hospice    Medically cleared to be discharged to group home on Hospice    10/31:  --No acute events overnight, laying in bed continuing to complain.  Patient is alert and oriented x1, knows her name.  --Plan is to discharge the patient to group home on hospice, case management arranging group home  --Continues to remain , will continue ivf     11/01:  No acute events, laying in a bed Patient is alert and oriented x1.  Palliative following.  Plan is to start the patient to go to group home on hospice.  She has been accepted to continue to hospice, pending group on medication admitted to group home.   --Vital sign has bene stable     11/2- she is alert to herself and being in the hospital. She does not like to answer the questions. She is sad. She most likely does not want to go home. Either way her prognosis is poor and hospice is appropriate     11/3-no event overnight.  No changes in medical condition.  Patient continues to be confused and irritated if ask orientation questions.  Plan continue home hospice on a group home.    11/4- little more awake, eating slight better. Sister is present. Talked to her and she is ok to remove wound vac. It is bothering to pt. I will discontinue medications that has no benefits, and that is what sister agreed that is appropriate. Pending group home placement     11/5- no event. No changes. Denies pain, pending placement. covid test ordered     11/6-no event overnight.  Patient remains somnolent.  Patient reports some back pain.  Wound VAC is removed.  QuantiFERON  test is pending will not be available until Monday.  I did call blood bank and excuse was that blood work drawn late on 11/4. They don't run quantiferon on weekend . Pending transfer to group home due to quantiferon test     Consultants/Specialty  orthopedics    Code Status  DNAR/DNI    Disposition  Patient is  Medically cleared to be discharged to group home on hospice      I have placed the appropriate orders for post-discharge needs.    Review of Systems  Limited secondary to patient pain mentation    Physical Exam  Temp:  [36.1 °C (96.9 °F)-37.1 °C (98.8 °F)] 36.1 °C (96.9 °F)  Pulse:  [] 100  Resp:  [16-17] 17  BP: (100-111)/(49-63) 108/55  SpO2:  [90 %-91 %] 90 %    Physical Exam  Vitals and nursing note reviewed.   Constitutional:       Appearance: She is ill-appearing.      Comments:  Frail and awake    HENT:      Head: Normocephalic and atraumatic.   Eyes:      Extraocular Movements: Extraocular movements intact.   Cardiovascular:      Rate and Rhythm: Normal rate.      Heart sounds: Murmur heard.     Pulmonary:      Effort: Pulmonary effort is normal. No respiratory distress.      Breath sounds: No wheezing.   Abdominal:      General: There is no distension.      Palpations: Abdomen is soft.      Tenderness: There is no abdominal tenderness.      Comments: wound VAC in her back   Musculoskeletal:      Cervical back: Neck supple.      Comments: Wound vac on her back    Skin:     General: Skin is dry.      Coloration: Skin is not jaundiced.   Neurological:      Mental Status: She is alert.      Motor: No weakness.      Comments: Knows her name, doesn't remember where he sees me the president or why she has been here   Psychiatric:         Behavior: Behavior is uncooperative.         Fluids  No intake or output data in the 24 hours ending 11/06/21 1328    Laboratory                        Imaging  DX-CHEST-LIMITED (1 VIEW)   Final Result      1.  Nodular opacity in the left lower lobe, possibly  representing early pneumonia or atelectasis. Follow-up imaging is recommended to document resolution.      2.  Thickening of the right minor fissure, possibly related to scarring or atelectasis.      3.  Increased nodular densities projecting over the right lower lung, possibly external to the patient. CT of the chest may be helpful for further evaluation.      CT-HEAD W/O   Final Result      1.  Cerebral atrophy.      2.  White matter lucencies most consistent with small vessel ischemic change versus demyelination or gliosis.      3.  Otherwise, Head CT without contrast with no acute findings. No evidence of acute cerebral infarction, hemorrhage or mass lesion.      DX-SHOULDER 2+ RIGHT   Final Result         Acute comminuted and mildly displaced fracture of the distal clavicle. No involvement of the AC joint.           Assessment/Plan  Advance care planning- (present on admission)  Assessment & Plan   I discussed the case with the patient's sister/patient Daughter over the phone .  I discussed advance care planning  For at least 35 minutes over the phone as patient daughter/sister could not be available face-to-face.,  I discussed with them including diagnosis, prognosis, plan of care, risks and benefits of any therapies that could be offered, as well as alternatives including palliation and hospice, as appropriate.  I placed a hospice referral  Changed CODE STATUS DNI/DN AR      Infection of lumbar spine (HCC)- (present on admission)  Assessment & Plan  Hx of    She has wound vac in place. 11/4- discuss with sister. Wound vac very uncomfortable to pt. Remove wound vac. Pt going for hospice, no reversible condition   Follows with Dr. Canales       Closed nondisplaced fracture of acromial end of right clavicle- (present on admission)  Assessment & Plan  Patient was transferred with a sling, will continue for support.  Pain control.  Right upper extremity neurovascularly intact, range of motion intact  No surgery per  ortho   -- plan to dc on hospice    Encephalopathy- (present on admission)  Assessment & Plan  In setting of UTI and sepsis verss  --Patient mentation has not been cleared, MRI of the brain  Without contrast that was obtained at [facility]': no obvious mets  11/4- this is he new baseline. Pt going for hospice, poor prognosis     Chronic pain syndrome- (present on admission)  Assessment & Plan  will hold opoid     Acquired hypothyroidism- (present on admission)  Assessment & Plan  Continue Synthroid 50 mcg daily    Paroxysmal atrial fibrillation (HCC)- (present on admission)  Assessment & Plan  Rate well controlled on metoprolol tartrate 25 mg p.o. twice daily, continue Xarelto for now  To consider to stop xarelto later     COPD (chronic obstructive pulmonary disease) (HCC)- (present on admission)  Assessment & Plan  Not in acute exacerbation.  DuoNebs as needed.      Non-small cell lung cancer (NSCLC) (HCC)- (present on admission)  Assessment & Plan  Follows with radiation oncology Dr. Fairbanks at [facility]   Case has been  discussed with Dr. Fairbanks for radiation oncology.     ---Patient was evaluated by medical oncology on 5/2021, stated and not a candidate for chemotherapy due to patient performance status.  After discussing extensively with radiation oncology sister that patient is a candidate for hospice.  I discussed the case with patient's daughter/sister, hospice referral has been placed  CM updated, she has been accepted to hospice    Also, her daughter/sister stated that they cant take care of her full time; They stated that she will need to go to group home for hospice   Case management reaching out to leadership           I have performed a physical exam and reviewed and updated ROS and Plan today (11/6/2021). In review of yesterday's note (11/5/2021), there are no changes except as documented above.     VTE prophylaxis: therapeutic anticoagulation with xarelto

## 2021-11-06 NOTE — PROGRESS NOTES
Called lab to see when Quantiferon Gold TB test can be resulted. Lab transferred call to the blood bank. Per the blood bank, Quantiferon tests do not result on the weekend, and since collected yesterday afternoon, will not result until Monday.

## 2021-11-06 NOTE — WOUND TEAM
[facility]  Inpatient Services  Wound and Skin Care Progress Note    Admission Date: 10/24/2021     Last order of IP CONSULT TO WOUND CARE was found on 10/24/2021 from Hospital Encounter on 10/24/2021     HPI, PMH, SH: Reviewed    Past Surgical History:   Procedure Laterality Date   • WIDE EXCISION  8/7/2009    Performed by AMADO HALL at SURGERY [facility] ORS   • WIDE EXCISION  9/18/08    Performed by GERRY CASTILLO at SURGERY [facility] ORS   • FLAP CLOSURE  9/18/08    Performed by GERRY CASTILLO at SURGERY [facility] ORS   • MASS EXCISION GENERAL  2008    left upper arm  (cancerous )   • CHOLECYSTECTOMY  2000    laparoscopically    • BIOPSY BREAST  1990    right breast biopsy  (benign)   • FOOT SURGERY  1990    mass removed from right foot   • HYSTERECTOMY RADICAL  1974    abdominal      Social History     Tobacco Use   • Smoking status: Former Smoker     Types: Cigarettes   • Smokeless tobacco: Never Used   • Tobacco comment: Quit in 1989   Substance Use Topics   • Alcohol use: No     No chief complaint on file.    Diagnosis: Sepsis (HCC) [A41.9]    Unit where seen by Wound Team: T312/02     WOUND CONSULT/FOLLOW UP RELATED TO:  VAC removal and alternate dressing.     WOUND HISTORY:  Patient was unable to provide history on back.  Patient from outside facility.  Apparently from fall?     WOUND ASSESSMENT/LDA         Wound 10/25/21 Full Thickness Wound Back Mid (Active)   Wound Image   11/05/21 1700   Site Assessment Red;Pink    Periwound Assessment Intact    Margins Attached edges    Closure None    Drainage Amount Scant    Drainage Description Serosanguineous    Treatments Cleansed;Site care    Wound Cleansing Approved Wound Cleanser    Periwound Protectant Skin Protectant Wipes to Periwound    Dressing Cleansing/Solutions Not Applicable    Dressing Options Hydrofera Blue Ready;Mepilex    Dressing Changed New    Dressing Status Clean;Dry;Intact    Dressing Change/Treatment Frequency  Every 72 hrs, and As Needed    NEXT Dressing Change/Treatment Date 21    NEXT Weekly Photo (Inpatient Only) 21    Non-staged Wound Description Full thickness    Wound Length (cm) 7 cm    Wound Width (cm) 2.2 cm    Wound Depth (cm) 2.5 cm    Wound Surface Area (cm^2) 15.4 cm^2    Wound Volume (cm^3) 38.5 cm^3    Wound Healing % -282    Shape oval    Wound Odor None    Exposed Structures IZABEL;None    WOUND NURSE ONLY - Time Spent with Patient (mins) 60       Vascular:    GOPAL:   No results found.    Lab Values:    Lab Results   Component Value Date/Time    WBC 6.5 10/30/2021 06:04 AM    RBC 3.90 (L) 10/30/2021 06:04 AM    HEMOGLOBIN 10.9 (L) 10/30/2021 06:04 AM    HEMATOCRIT 35.6 (L) 10/30/2021 06:04 AM      Culture Results show:  No results found for this or any previous visit (from the past 720 hour(s)).    Pain Level/Medicated:  Pre-medicated       INTERVENTIONS BY WOUND TEAM:  Performed standard wound care which includes appropriate positioning, dressing removal and non-selective debridement. Pictures and measurements obtained weekly if/when required.  Preparation for Dressing removal: Dressing soaked with wound cleanser   Non-selectively Debrided with:  wound cleanser and gauze.  Sharp debridement: NA  Michaela wound: Cleansed with wound cleanser and gauze, Prepped with no sting skin prep  Primary Dressin pieces of hydrofera blue, one smaller piece to depth and second to cover wound.    Secondary (Outer) Dressing: mepilex     Interdisciplinary consultation: Patient, Bedside RN     EVALUATION / RATIONALE FOR TREATMENT:  Most Recent Date:  21: patient transitioning to comfort care/hospice and transferring group home.      21: silver nitrate to wound bed to hypergranular tissue again.  Continue with NPWT at this time.  Patient possibly going hospice.  21: Silver nitrate to help with hypergranular tissue, collagen to wound base and UM on proximal end.  Continue with NPWT and offloading.    10/29/21 granular tissue is dull pink with irregular surface.  Added collagen to enhance cellular activity.    10/27/21: Mid back wound with persistent hypergranulation although a little less than last treatment. Patient was having significant pain, but denied it in her wound and mostly when moving her. Did not assess sacrum today. Dressing CDI.   10/25/21: mid-back wound has hypergranulation, silver nitrate applied to hypergranulation to help possibly flatten down the growth.  Possibly need provider debridement to remove all hypergranulation.  NPWT continued to help with drainage.  Sacrum is a POA DTI, offloaded with sacral mepilex. Right heel is callus and dry skin, no wounds.      Goals: Steady decrease in wound area and depth weekly.    WOUND TEAM PLAN OF CARE ([X] for frequency of wound follow up,):   Nursing to follow orders written for wound care. Contact wound team if area fails to progress, deteriorates or with any questions/concerns  Dressing changes by wound team:                   Follow up 3 times weekly:                NPWT change 3 times weekly:  X,  Mid-back  Follow up 1-2 times weekly:      Follow up Bi-Monthly:                   Follow up as needed:   sacrum  Other (explain):     NURSING PLAN OF CARE ORDERS (X):  Dressing changes: See Dressing Care orders: x  Skin care: See Skin Care orders:   RN Prevention Protocol: x  Rectal tube care: See Rectal Tube Care orders:   Other orders:    Anticipated discharge plans:   LTACH:        SNF/Rehab:  x                Home Health Care:           Outpatient Wound Center:            Self/Family Care:        Other:                  Vac Discharge Needs:   Not Applicable Pt not on a wound vac:       Regular Vac while inpatient, alternative dressing at DC:        Regular Vac in use and continued at DC:          x  Reg. Vac w/ Skin Sub/Biologic in use. Will need to be changed 2x wkly:      Veraflo Vac while inpatient, ok to transition to Regular Vac on Discharge:            Veraflo Vac while inpatient, will need to remain on Veraflo Vac upon discharge:

## 2021-11-06 NOTE — CARE PLAN
Covid-19 Surge in effect   The patient is Stable - Low risk of patient condition declining or worsening    Shift Goals  Clinical Goals: Safety  Patient Goals: Comfort  Family Goals: N/A    Progress made toward(s) clinical / shift goals:    Problem: Knowledge Deficit - Standard  Goal: Patient and family/care givers will demonstrate understanding of plan of care, disease process/condition, diagnostic tests and medications  Outcome: Progressing   Plan of care discussed with patient, verbalizes understanding. Encouraged to voice feelings or concerns.     Problem: Pain - Standard  Goal: Alleviation of pain or a reduction in pain to the patient’s comfort goal  Outcome: Progressing   Available pain medications reviewed with patient. Pain managed by PRN and scheduled medications. Encouraged to call if pain is no longer managed.     Problem: Fall Risk  Goal: Patient will remain free from falls  Outcome: Progressing   Fall precautions in place. Patient rounded on hourly. Clutter-free environment maintained. Bed alarm on, bed locked and in lowest position. Call light in reach.   Telesitter at bedside.

## 2021-11-07 PROCEDURE — 770001 HCHG ROOM/CARE - MED/SURG/GYN PRIV*

## 2021-11-07 PROCEDURE — A9270 NON-COVERED ITEM OR SERVICE: HCPCS | Performed by: INTERNAL MEDICINE

## 2021-11-07 PROCEDURE — 700102 HCHG RX REV CODE 250 W/ 637 OVERRIDE(OP): Performed by: INTERNAL MEDICINE

## 2021-11-07 PROCEDURE — A9270 NON-COVERED ITEM OR SERVICE: HCPCS | Performed by: STUDENT IN AN ORGANIZED HEALTH CARE EDUCATION/TRAINING PROGRAM

## 2021-11-07 PROCEDURE — 700102 HCHG RX REV CODE 250 W/ 637 OVERRIDE(OP): Performed by: STUDENT IN AN ORGANIZED HEALTH CARE EDUCATION/TRAINING PROGRAM

## 2021-11-07 PROCEDURE — 99231 SBSQ HOSP IP/OBS SF/LOW 25: CPT | Performed by: INTERNAL MEDICINE

## 2021-11-07 RX ADMIN — METOPROLOL TARTRATE 25 MG: 25 TABLET, FILM COATED ORAL at 17:09

## 2021-11-07 RX ADMIN — OXYCODONE HYDROCHLORIDE 10 MG: 10 TABLET ORAL at 04:03

## 2021-11-07 RX ADMIN — METOPROLOL TARTRATE 25 MG: 25 TABLET, FILM COATED ORAL at 04:03

## 2021-11-07 RX ADMIN — DOCUSATE SODIUM 50 MG AND SENNOSIDES 8.6 MG 2 TABLET: 8.6; 5 TABLET, FILM COATED ORAL at 17:09

## 2021-11-07 RX ADMIN — OXYCODONE HYDROCHLORIDE 10 MG: 10 TABLET ORAL at 15:18

## 2021-11-07 RX ADMIN — LEVOTHYROXINE SODIUM 50 MCG: 0.05 TABLET ORAL at 04:03

## 2021-11-07 RX ADMIN — RIVAROXABAN 20 MG: 20 TABLET, FILM COATED ORAL at 17:54

## 2021-11-07 ASSESSMENT — PAIN DESCRIPTION - PAIN TYPE
TYPE: ACUTE PAIN

## 2021-11-07 NOTE — PROGRESS NOTES
Hospital Medicine Daily Progress Note    Date of Service  11/7/2021    Chief Complaint  Idalia HALE is a 81 y.o. female admitted 10/24/2021 with sepsis, ground-level fall, right acromial distal fracture, UTI    Hospital Course  This is a 81 -year-old female with a past medical significant for COPD not on home O2 , urinary incontinence, diabetes mellitus, prior VT, hypertension  A. fib, secondary hypercoagulable state, not on anticoagulation, lung cancer diagnosed in July 2021, chronic back wound with wound VAC in place, followed by Dr. Canales and wound care team was transferred from [facility] on 10/24/2021.    Patient reports having a ground-level fall, leading to distal clavicle fracture hypokalemia resume 2.6, septic with elevated WBC 11.9, tachycardic with heart rate of 116.    Unfortunately blood cultures were not obtained during the admission secondary to urinary tract infection.  Urine culture was obtained, patient was started on ceftriaxone.  Regarding her distal clavicle fracture, orthopedic surgery  Dr Whalen was consulted, recommended sling for comfort, pain control, recommended following up with repeat x-ray in 2 weeks.    Patient noted that patient was encephalopathic thought to be secondary to urinary tract infection.  Upon my evaluation today, she is noted to be alert and oriented 2-3; not able to answer complex questions.  PT/OT has evaluated the patient and recommend postacute.  Patient needs maximum assist at this time.  She is not safe to go home.    I called patient sister, she does for Dr. Chen of radiation oncology at [place], she has an MRI of the brain obtained 10/20/2021, requesting records.  Patient sister stated that he has been hospitalized at [place] and received 4 to 6 weeks of IV antibiotic for her back wound.  Wound care has been consulted during the stay in the hospital.  Blood culture has been ordered    Interval Problem Update  10/26:  No  acute events overnight, patient is alert and oriented x2-3.  Vital signs stable, currently saturating well on room air.  Blood culture has been ordered, urine culture growing greater than 100,000 ESBL E. coli, will provide fosfomycin  --Noted to be anemic with hemoglobin of 11.5, will transfuse if less than 7  PT/OT evaluated, recommend postacute.  Patient does not wanted to go to skilled nursing facility as he is max assist    Patient is alert and oriented x1-2, her mentation fluctuating, cognitive evaluation ordered.  Patient does not have POA for medical.    10/27:  --Patient continued to be confused elated x2.  I had an extensive discussion with the patient oldest daughter and patient sister, CODE STATUS has been changed to DNI/DN AR.  After next discussion with the patient sister/daughter, hospice referral has been placed.  --I also discussed the case with Dr. Fairbanks of radiation oncology, it is noted that patient has stage III to stage IV lung cancer which has been progressed since May.  Patient had not received any chemotherapy/radiation therapy.  Dr. Hoyt of oncology has evaluated the patient on May 2021 who stated that because of the patient's poor performance status she is not a candidate of chemotherapy.    --I discussed with radiation oncology who stated that because of the patient worsening mental status.  Patient is a candidate of hospice option extensive discussion of the patient/discussed-over the phone, decision was made to have a hospice referral.    10/28:  --No acute events overnight, laying in bed, denies any complaint, she continues to remain confused  --Hospice referral has been sent  Patient is medically stable to be discharged to home on home hospice    10/29:  --No acute events overnight, vital signs stable.  I called the patient's sister, who wanted her to be comfortable.  --She does have a wound in her back  She has been accepted by hospice and significant need for chemotherapy  considering patient poor performance status as per Medical oncology in 5/2021 and radiation oncology.  Patient cancer continue to grow during this.  Since diagnosis in May 2021.  --She is very frail debilitated    10/30:  --No acute events overnight, patient is alert and oriented x1-2.  Vital signs stable, provide boost 3 times daily.  Patient had an x-ray by hospice  -- She needs to be discharged on group home on home hospice    Medically cleared to be discharged to group home on Hospice    10/31:  --No acute events overnight, laying in bed continuing to complain.  Patient is alert and oriented x1, knows her name.  --Plan is to discharge the patient to group home on hospice, case management arranging group home  --Continues to remain , will continue ivf     11/01:  No acute events, laying in a bed Patient is alert and oriented x1.  Palliative following.  Plan is to start the patient to go to group home on hospice.  She has been accepted to continue to hospice, pending group on medication admitted to group home.   --Vital sign has bene stable     11/2- she is alert to herself and being in the hospital. She does not like to answer the questions. She is sad. She most likely does not want to go home. Either way her prognosis is poor and hospice is appropriate     11/3-no event overnight.  No changes in medical condition.  Patient continues to be confused and irritated if ask orientation questions.  Plan continue home hospice on a group home.    11/4- little more awake, eating slight better. Sister is present. Talked to her and she is ok to remove wound vac. It is bothering to pt. I will discontinue medications that has no benefits, and that is what sister agreed that is appropriate. Pending group home placement     11/5- no event. No changes. Denies pain, pending placement. covid test ordered     11/6-no event overnight.  Patient remains somnolent.  Patient reports some back pain.  Wound VAC is removed.  QuantiFERON  test is pending will not be available until Monday.  I did call blood bank and excuse was that blood work drawn late on 11/4. They don't run quantiferon on weekend . Pending transfer to group home due to quantiferon test     11/7- no event. Pt looks more drowsy and lethargic. Reports some back pain. She is comfort care/hospice and prognosis is very poor. Possible less a month survival the way she is progressing. Pending transfer to group home with hospice     Consultants/Specialty  orthopedics    Code Status  DNAR/DNI    Disposition  Patient is  Medically cleared to be discharged to group home on hospice      I have placed the appropriate orders for post-discharge needs.    Review of Systems  Limited secondary to patient pain mentation    Physical Exam  Temp:  [36.1 °C (97 °F)-36.6 °C (97.8 °F)] 36.4 °C (97.5 °F)  Pulse:  [] 118  Resp:  [16-17] 17  BP: (104-118)/(51-70) 109/51  SpO2:  [90 %-94 %] 90 %    Physical Exam  Vitals and nursing note reviewed.   Constitutional:       Appearance: She is ill-appearing.      Comments:  Frail and awake    HENT:      Head: Normocephalic and atraumatic.   Eyes:      Extraocular Movements: Extraocular movements intact.   Cardiovascular:      Rate and Rhythm: Normal rate.      Heart sounds: Murmur heard.       Pulmonary:      Effort: Pulmonary effort is normal. No respiratory distress.      Breath sounds: No wheezing.   Abdominal:      General: There is no distension.      Palpations: Abdomen is soft.      Tenderness: There is no abdominal tenderness.      Comments: wound VAC in her back   Musculoskeletal:      Cervical back: Neck supple.      Comments: Wound vac on her back    Skin:     General: Skin is dry.      Coloration: Skin is not jaundiced.   Neurological:      Mental Status: She is alert.      Motor: No weakness.      Comments: Knows her name, doesn't remember where he sees me the president or why she has been here   Psychiatric:         Behavior: Behavior is  uncooperative.         Fluids  No intake or output data in the 24 hours ending 11/07/21 1057    Laboratory                        Imaging  DX-CHEST-LIMITED (1 VIEW)   Final Result      1.  Nodular opacity in the left lower lobe, possibly representing early pneumonia or atelectasis. Follow-up imaging is recommended to document resolution.      2.  Thickening of the right minor fissure, possibly related to scarring or atelectasis.      3.  Increased nodular densities projecting over the right lower lung, possibly external to the patient. CT of the chest may be helpful for further evaluation.      CT-HEAD W/O   Final Result      1.  Cerebral atrophy.      2.  White matter lucencies most consistent with small vessel ischemic change versus demyelination or gliosis.      3.  Otherwise, Head CT without contrast with no acute findings. No evidence of acute cerebral infarction, hemorrhage or mass lesion.      DX-SHOULDER 2+ RIGHT   Final Result         Acute comminuted and mildly displaced fracture of the distal clavicle. No involvement of the AC joint.           Assessment/Plan  Advance care planning- (present on admission)  Assessment & Plan   I discussed the case with the patient's sister/patient Daughter over the phone .  I discussed advance care planning  For at least 35 minutes over the phone as patient daughter/sister could not be available face-to-face.,  I discussed with them including diagnosis, prognosis, plan of care, risks and benefits of any therapies that could be offered, as well as alternatives including palliation and hospice, as appropriate.  I placed a hospice referral  Changed CODE STATUS DNI/DN AR      Infection of lumbar spine (HCC)- (present on admission)  Assessment & Plan  Hx of    She has wound vac in place. 11/4- discuss with sister. Wound vac very uncomfortable to pt. Remove wound vac. Pt going for hospice, no reversible condition   Follows with Dr. Canales       Closed nondisplaced fracture of  acromial end of right clavicle- (present on admission)  Assessment & Plan  Patient was transferred with a sling, will continue for support.  Pain control.  Right upper extremity neurovascularly intact, range of motion intact  No surgery per ortho   -- plan to dc on hospice    Encephalopathy- (present on admission)  Assessment & Plan  In setting of UTI and sepsis verss  --Patient mentation has not been cleared, MRI of the brain  Without contrast that was obtained at [facility] no obvious mets  11/4- this is he new baseline. Pt going for hospice, poor prognosis     Chronic pain syndrome- (present on admission)  Assessment & Plan  will hold opoid     Acquired hypothyroidism- (present on admission)  Assessment & Plan  Continue Synthroid 50 mcg daily    Paroxysmal atrial fibrillation (HCC)- (present on admission)  Assessment & Plan  Rate well controlled on metoprolol tartrate 25 mg p.o. twice daily, continue Xarelto for now  To consider to stop xarelto later     COPD (chronic obstructive pulmonary disease) (HCC)- (present on admission)  Assessment & Plan  Not in acute exacerbation.  DuoNebs as needed.      Non-small cell lung cancer (NSCLC) (HCC)- (present on admission)  Assessment & Plan  Follows with radiation oncology Dr. Fairbanks at [facility]   Case has been  discussed with Dr. [place] for radiation oncology.     ---Patient was evaluated by medical oncology on 5/2021, stated and not a candidate for chemotherapy due to patient performance status.  After discussing extensively with radiation oncology sister that patient is a candidate for hospice.  I discussed the case with patient's daughter/sister, hospice referral has been placed  CM updated, she has been accepted to hospice    Also, her daughter/sister stated that they cant take care of her full time; They stated that she will need to go to group home for hospice   Case management reaching out to leadership           I have performed a physical exam and reviewed and  updated ROS and Plan today (11/7/2021). In review of yesterday's note (11/6/2021), there are no changes except as documented above.     VTE prophylaxis: therapeutic anticoagulation with xarelto

## 2021-11-07 NOTE — CARE PLAN
The patient is Stable - Low risk of patient condition declining or worsening    Shift Goals  Clinical Goals: Safety  Patient Goals: Comfort  Family Goals: N/A    Progress made toward(s) clinical / shift goals:  Bed locked and in lowest position. Safety and fall precautions in place. Hourly rounding. Call light and belongings within reach.      Patient is not progressing towards the following goals: Refused waffle overlay despite education.

## 2021-11-07 NOTE — CARE PLAN
Problem: Pain - Standard  Goal: Alleviation of pain or a reduction in pain to the patient’s comfort goal  Outcome: Progressing     Problem: Fall Risk  Goal: Patient will remain free from falls  Outcome: Progressing     The patient is Stable - Low risk of patient condition declining or worsening    Shift Goals  Clinical Goals: Safety, pain control  Patient Goals: Comfort  Family Goals: N/A    Progress made toward(s) clinical / shift goals:  Bed locked and in lowest position. Bed alarm on. Telesitter remains as pt is still impulsive and attempts to get out of bed without calling for assistance. Hourly rounding. PRN pain medication available per MAR.    Patient is not progressing towards the following goals:

## 2021-11-08 LAB
GAMMA INTERFERON BACKGROUND BLD IA-ACNC: 0.08 IU/ML
M TB IFN-G BLD-IMP: NEGATIVE
M TB IFN-G CD4+ BCKGRND COR BLD-ACNC: -0.04 IU/ML
MITOGEN IGNF BCKGRD COR BLD-ACNC: 6.45 IU/ML
QFT TB2 - NIL TBQ2: -0.04 IU/ML

## 2021-11-08 PROCEDURE — 700102 HCHG RX REV CODE 250 W/ 637 OVERRIDE(OP): Performed by: INTERNAL MEDICINE

## 2021-11-08 PROCEDURE — 99231 SBSQ HOSP IP/OBS SF/LOW 25: CPT | Performed by: INTERNAL MEDICINE

## 2021-11-08 PROCEDURE — A9270 NON-COVERED ITEM OR SERVICE: HCPCS | Performed by: INTERNAL MEDICINE

## 2021-11-08 PROCEDURE — 770001 HCHG ROOM/CARE - MED/SURG/GYN PRIV*

## 2021-11-08 PROCEDURE — A9270 NON-COVERED ITEM OR SERVICE: HCPCS | Performed by: STUDENT IN AN ORGANIZED HEALTH CARE EDUCATION/TRAINING PROGRAM

## 2021-11-08 PROCEDURE — 700102 HCHG RX REV CODE 250 W/ 637 OVERRIDE(OP): Performed by: STUDENT IN AN ORGANIZED HEALTH CARE EDUCATION/TRAINING PROGRAM

## 2021-11-08 RX ADMIN — LEVOTHYROXINE SODIUM 50 MCG: 0.05 TABLET ORAL at 06:10

## 2021-11-08 RX ADMIN — METOPROLOL TARTRATE 25 MG: 25 TABLET, FILM COATED ORAL at 06:10

## 2021-11-08 RX ADMIN — DOCUSATE SODIUM 50 MG AND SENNOSIDES 8.6 MG 2 TABLET: 8.6; 5 TABLET, FILM COATED ORAL at 06:10

## 2021-11-08 RX ADMIN — METOPROLOL TARTRATE 25 MG: 25 TABLET, FILM COATED ORAL at 18:23

## 2021-11-08 RX ADMIN — OXYCODONE 5 MG: 5 TABLET ORAL at 00:26

## 2021-11-08 ASSESSMENT — PAIN DESCRIPTION - PAIN TYPE
TYPE: ACUTE PAIN

## 2021-11-08 NOTE — PROGRESS NOTES
Hospital Medicine Daily Progress Note    Date of Service  11/8/2021    Chief Complaint  Idalia HALE is a 81 y.o. female admitted 10/24/2021 with sepsis, ground-level fall, right acromial distal fracture, UTI    Hospital Course  This is a 81 -year-old female with a past medical significant for COPD not on home O2 , urinary incontinence, diabetes mellitus, prior VT, hypertension  A. fib, secondary hypercoagulable state, not on anticoagulation, lung cancer diagnosed in July 2021, chronic back wound with wound VAC in place, followed by Dr. Canales and wound care team was transferred from [facility] on 10/24/2021.    Patient reports having a ground-level fall, leading to distal clavicle fracture hypokalemia resume 2.6, septic with elevated WBC 11.9, tachycardic with heart rate of 116.    Unfortunately blood cultures were not obtained during the admission secondary to urinary tract infection.  Urine culture was obtained, patient was started on ceftriaxone.  Regarding her distal clavicle fracture, orthopedic surgery  Dr Whalen was consulted, recommended sling for comfort, pain control, recommended following up with repeat x-ray in 2 weeks.    Patient noted that patient was encephalopathic thought to be secondary to urinary tract infection.  Upon my evaluation today, she is noted to be alert and oriented 2-3; not able to answer complex questions.  PT/OT has evaluated the patient and recommend postacute.  Patient needs maximum assist at this time.  She is not safe to go home.    I called patient sister, she does for Dr. Chen of radiation oncology at [place], she has an MRI of the brain obtained 10/20/2021, requesting records.  Patient sister stated that he has been hospitalized at [place] and received 4 to 6 weeks of IV antibiotic for her back wound.  Wound care has been consulted during the stay in the hospital.  Blood culture has been ordered    Interval Problem Update  10/26:  No  acute events overnight, patient is alert and oriented x2-3.  Vital signs stable, currently saturating well on room air.  Blood culture has been ordered, urine culture growing greater than 100,000 ESBL E. coli, will provide fosfomycin  --Noted to be anemic with hemoglobin of 11.5, will transfuse if less than 7  PT/OT evaluated, recommend postacute.  Patient does not wanted to go to skilled nursing facility as he is max assist    Patient is alert and oriented x1-2, her mentation fluctuating, cognitive evaluation ordered.  Patient does not have POA for medical.    10/27:  --Patient continued to be confused elated x2.  I had an extensive discussion with the patient oldest daughter and patient sister, CODE STATUS has been changed to DNI/DN AR.  After next discussion with the patient sister/daughter, hospice referral has been placed.  --I also discussed the case with Dr. Fairbanks of radiation oncology, it is noted that patient has stage III to stage IV lung cancer which has been progressed since May.  Patient had not received any chemotherapy/radiation therapy.  Dr. Hoyt of oncology has evaluated the patient on May 2021 who stated that because of the patient's poor performance status she is not a candidate of chemotherapy.    --I discussed with radiation oncology who stated that because of the patient worsening mental status.  Patient is a candidate of hospice option extensive discussion of the patient/discussed-over the phone, decision was made to have a hospice referral.    10/28:  --No acute events overnight, laying in bed, denies any complaint, she continues to remain confused  --Hospice referral has been sent  Patient is medically stable to be discharged to home on home hospice    10/29:  --No acute events overnight, vital signs stable.  I called the patient's sister, who wanted her to be comfortable.  --She does have a wound in her back  She has been accepted by hospice and significant need for chemotherapy  considering patient poor performance status as per Medical oncology in 5/2021 and radiation oncology.  Patient cancer continue to grow during this.  Since diagnosis in May 2021.  --She is very frail debilitated    10/30:  --No acute events overnight, patient is alert and oriented x1-2.  Vital signs stable, provide boost 3 times daily.  Patient had an x-ray by hospice  -- She needs to be discharged on group home on home hospice    Medically cleared to be discharged to group home on Hospice    10/31:  --No acute events overnight, laying in bed continuing to complain.  Patient is alert and oriented x1, knows her name.  --Plan is to discharge the patient to group home on hospice, case management arranging group home  --Continues to remain , will continue ivf     11/01:  No acute events, laying in a bed Patient is alert and oriented x1.  Palliative following.  Plan is to start the patient to go to group home on hospice.  She has been accepted to continue to hospice, pending group on medication admitted to group home.   --Vital sign has bene stable     11/2- she is alert to herself and being in the hospital. She does not like to answer the questions. She is sad. She most likely does not want to go home. Either way her prognosis is poor and hospice is appropriate     11/3-no event overnight.  No changes in medical condition.  Patient continues to be confused and irritated if ask orientation questions.  Plan continue home hospice on a group home.    11/4- little more awake, eating slight better. Sister is present. Talked to her and she is ok to remove wound vac. It is bothering to pt. I will discontinue medications that has no benefits, and that is what sister agreed that is appropriate. Pending group home placement     11/5- no event. No changes. Denies pain, pending placement. covid test ordered     11/6-no event overnight.  Patient remains somnolent.  Patient reports some back pain.  Wound VAC is removed.  QuantiFERON  test is pending will not be available until Monday.  I did call blood bank and excuse was that blood work drawn late on 11/4. They don't run quantiferon on weekend . Pending transfer to group home due to quantiferon test     11/7- no event. Pt looks more drowsy and lethargic. Reports some back pain. She is comfort care/hospice and prognosis is very poor. Possible less a month survival the way she is progressing. Pending transfer to group home with hospice     11/8- no event. Pending placement at group home on hospice. quantiferon still not processed     Consultants/Specialty  orthopedics    Code Status  DNAR/DNI    Disposition  Patient is  Medically cleared to be discharged to group home on hospice      I have placed the appropriate orders for post-discharge needs.    Review of Systems  Limited secondary to patient pain mentation    Physical Exam  Temp:  [35.8 °C (96.5 °F)-36.9 °C (98.4 °F)] 36.1 °C (97 °F)  Pulse:  [] 100  Resp:  [16-17] 16  BP: (100-127)/(59-62) 100/59  SpO2:  [90 %-92 %] 92 %    Physical Exam  Vitals and nursing note reviewed.   Constitutional:       Appearance: She is ill-appearing.      Comments:  Frail and awake    HENT:      Head: Normocephalic and atraumatic.   Eyes:      Extraocular Movements: Extraocular movements intact.   Cardiovascular:      Rate and Rhythm: Normal rate.      Heart sounds: Murmur heard.       Pulmonary:      Effort: Pulmonary effort is normal. No respiratory distress.      Breath sounds: No wheezing.   Abdominal:      General: There is no distension.      Palpations: Abdomen is soft.      Tenderness: There is no abdominal tenderness.      Comments: wound VAC in her back   Musculoskeletal:      Cervical back: Neck supple.      Comments: Wound vac on her back    Skin:     General: Skin is dry.      Coloration: Skin is not jaundiced.   Neurological:      Mental Status: She is alert.      Motor: No weakness.      Comments: Knows her name, doesn't remember where he  sees me the president or why she has been here   Psychiatric:         Behavior: Behavior is uncooperative.         Fluids    Intake/Output Summary (Last 24 hours) at 11/8/2021 1511  Last data filed at 11/8/2021 0900  Gross per 24 hour   Intake 120 ml   Output 0 ml   Net 120 ml       Laboratory                        Imaging  DX-CHEST-LIMITED (1 VIEW)   Final Result      1.  Nodular opacity in the left lower lobe, possibly representing early pneumonia or atelectasis. Follow-up imaging is recommended to document resolution.      2.  Thickening of the right minor fissure, possibly related to scarring or atelectasis.      3.  Increased nodular densities projecting over the right lower lung, possibly external to the patient. CT of the chest may be helpful for further evaluation.      CT-HEAD W/O   Final Result      1.  Cerebral atrophy.      2.  White matter lucencies most consistent with small vessel ischemic change versus demyelination or gliosis.      3.  Otherwise, Head CT without contrast with no acute findings. No evidence of acute cerebral infarction, hemorrhage or mass lesion.      DX-SHOULDER 2+ RIGHT   Final Result         Acute comminuted and mildly displaced fracture of the distal clavicle. No involvement of the AC joint.           Assessment/Plan  Advance care planning- (present on admission)  Assessment & Plan   I discussed the case with the patient's sister/patient Daughter over the phone .  I discussed advance care planning  For at least 35 minutes over the phone as patient daughter/sister could not be available face-to-face.,  I discussed with them including diagnosis, prognosis, plan of care, risks and benefits of any therapies that could be offered, as well as alternatives including palliation and hospice, as appropriate.  I placed a hospice referral  Changed CODE STATUS DNI/DN AR      Infection of lumbar spine (HCC)- (present on admission)  Assessment & Plan  Hx of    She has wound vac in place. 11/4-  discuss with sister. Wound vac very uncomfortable to pt. Remove wound vac. Pt going for hospice, no reversible condition   Follows with Dr. Canales       Closed nondisplaced fracture of acromial end of right clavicle- (present on admission)  Assessment & Plan  Patient was transferred with a sling, will continue for support.  Pain control.  Right upper extremity neurovascularly intact, range of motion intact  No surgery per ortho   -- plan to dc on hospice    Encephalopathy- (present on admission)  Assessment & Plan  In setting of UTI and sepsis verss  --Patient mentation has not been cleared, MRI of the brain  Without contrast that was obtained at [place]': no obvious mets  11/4- this is he new baseline. Pt going for hospice, poor prognosis     Chronic pain syndrome- (present on admission)  Assessment & Plan  will hold opoid     Acquired hypothyroidism- (present on admission)  Assessment & Plan  Continue Synthroid 50 mcg daily    Paroxysmal atrial fibrillation (HCC)- (present on admission)  Assessment & Plan  Rate well controlled on metoprolol tartrate 25 mg p.o. twice daily, continue Xarelto for now  To consider to stop xarelto later     COPD (chronic obstructive pulmonary disease) (HCC)- (present on admission)  Assessment & Plan  Not in acute exacerbation.  DuoNebs as needed.      Non-small cell lung cancer (NSCLC) (HCC)- (present on admission)  Assessment & Plan  Follows with radiation oncology Dr. Fairbanks at [facility]   Case has been  discussed with Dr. Fairbanks for radiation oncology.     ---Patient was evaluated by medical oncology on 5/2021, stated and not a candidate for chemotherapy due to patient performance status.  After discussing extensively with radiation oncology sister that patient is a candidate for hospice.  I discussed the case with patient's daughter/sister, hospice referral has been placed  CM updated, she has been accepted to hospice    Also, her daughter/sister stated that they cant take care of her  full time; They stated that she will need to go to group home for hospice   Case management reaching out to leadership           I have performed a physical exam and reviewed and updated ROS and Plan today (11/8/2021). In review of yesterday's note (11/7/2021), there are no changes except as documented above.     VTE prophylaxis: therapeutic anticoagulation with xarelto

## 2021-11-08 NOTE — PROGRESS NOTES
Called the blood bank to see when Quantiferon test will be resulted. Per the blood bank, will result at around 3:30 pm- 4:30 pm today.

## 2021-11-08 NOTE — DISCHARGE PLANNING
Anticipated Discharge Disposition: Hospice with group home on [facility].     Action: Patient to discharge to [facility] ([address] [place] [address]) pending results of quant gold test. [facility] to provide hospice services at discharge.     Barriers to Discharge:  placement; quant gold test results.     Plan:  to continue to follow for discharge needs.

## 2021-11-08 NOTE — CARE PLAN
Problem: Pain - Standard  Goal: Alleviation of pain or a reduction in pain to the patient’s comfort goal  Outcome: Progressing     The patient is Watcher - Medium risk of patient condition declining or worsening    Shift Goals  Clinical Goals: Safety, pain control  Patient Goals: Comfort  Family Goals: N/A    Progress made toward(s) clinical / shift goals:  Bed locked and in lowest position. Bed alarm on. Telesitter in place. Hourly rounding. Pain well controlled with current regimen.    Patient is not progressing towards the following goals:      Problem: Fall Risk  Goal: Patient will remain free from falls  Outcome: Not Progressing  Pt does not use call light to call for assistance. Pt has made several attempts to get out of bed unassisted since the start of shift. Telesitter remains as pt remains impulsive.

## 2021-11-08 NOTE — CARE PLAN
The patient is Stable - Low risk of patient condition declining or worsening    Shift Goals  Clinical Goals: Safety, Pain Control, Emotional Support  Patient Goals: Comfort  Family Goals: N/A    Progress made toward(s) clinical / shift goals:    Problem: Pain - Standard  Goal: Alleviation of pain or a reduction in pain to the patient’s comfort goal  Outcome: Progressing       Patient is not progressing towards the following goals:

## 2021-11-09 VITALS
DIASTOLIC BLOOD PRESSURE: 70 MMHG | HEART RATE: 91 BPM | TEMPERATURE: 97.4 F | WEIGHT: 102.73 LBS | SYSTOLIC BLOOD PRESSURE: 110 MMHG | BODY MASS INDEX: 18.91 KG/M2 | RESPIRATION RATE: 16 BRPM | OXYGEN SATURATION: 93 % | HEIGHT: 62 IN

## 2021-11-09 PROCEDURE — 700102 HCHG RX REV CODE 250 W/ 637 OVERRIDE(OP): Performed by: STUDENT IN AN ORGANIZED HEALTH CARE EDUCATION/TRAINING PROGRAM

## 2021-11-09 PROCEDURE — 99239 HOSP IP/OBS DSCHRG MGMT >30: CPT | Mod: GW | Performed by: INTERNAL MEDICINE

## 2021-11-09 PROCEDURE — A9270 NON-COVERED ITEM OR SERVICE: HCPCS | Performed by: STUDENT IN AN ORGANIZED HEALTH CARE EDUCATION/TRAINING PROGRAM

## 2021-11-09 PROCEDURE — A9270 NON-COVERED ITEM OR SERVICE: HCPCS | Performed by: INTERNAL MEDICINE

## 2021-11-09 PROCEDURE — 700102 HCHG RX REV CODE 250 W/ 637 OVERRIDE(OP): Performed by: INTERNAL MEDICINE

## 2021-11-09 RX ADMIN — METOPROLOL TARTRATE 25 MG: 25 TABLET, FILM COATED ORAL at 06:14

## 2021-11-09 RX ADMIN — OXYCODONE HYDROCHLORIDE 10 MG: 10 TABLET ORAL at 09:16

## 2021-11-09 RX ADMIN — LEVOTHYROXINE SODIUM 50 MCG: 0.05 TABLET ORAL at 06:14

## 2021-11-09 ASSESSMENT — PAIN DESCRIPTION - PAIN TYPE
TYPE: ACUTE PAIN
TYPE: ACUTE PAIN

## 2021-11-09 NOTE — PROGRESS NOTES
Pt in no acute distress no SOB. Discharge instruction information reviewed with pt. All questions answered. Pt transferred via [place].

## 2021-11-09 NOTE — CARE PLAN
The patient is Stable - Low risk of patient condition declining or worsening    Shift Goals  Clinical Goals: Safety, pain control  Patient Goals: Comfort  Family Goals: N/A    Progress made toward(s) clinical / shift goals:    Problem: Pain - Standard  Goal: Alleviation of pain or a reduction in pain to the patient’s comfort goal  Outcome: Progressing     Problem: Fall Risk  Goal: Patient will remain free from falls  Outcome: Progressing       Patient is not progressing towards the following goals:

## 2021-11-09 NOTE — DISCHARGE SUMMARY
Discharge Summary    CHIEF COMPLAINT ON ADMISSION  No chief complaint on file.      Reason for Admission  UTI (urinary tract infection)     Admission Date  10/24/2021    CODE STATUS  DNAR/DNI    HPI & HOSPITAL COURSE  Ms. Idalia Freitas is a 81 y.o. female with history of COPD, urinary incontinence, mitral valve prolapse, hypertension, lung cancer, atrial fibrillation who presented on 10/24/2021 with sepsis secondary to urinary tract infection.  Initially presented to [facility] after ground-level fall.  Imaging showed distal clavicular fracture.  She was found to have a E. coli urine tract infection and started on fosfomycin.  Orthopedic surgery recommended conservative management with a sling for comfort, pain control.  Patient also had progressive lung cancer.  Oncology felt she was not a candidate for chemotherapy.  Patient family opted for hospice.  Patient was accepted by [facility] hospice and [facility].  Medically stable to discharge home with hospice.      Therefore, she is discharged in guarded and stable condition to hospice.    The patient met 2-midnight criteria for an inpatient stay at the time of discharge.    Discharge Date  11/9/2021    FOLLOW UP ITEMS POST DISCHARGE  None    DISCHARGE DIAGNOSES  Active Problems:    Non-small cell lung cancer (NSCLC) (HCC) POA: Yes    COPD (chronic obstructive pulmonary disease) (HCC) POA: Yes    Paroxysmal atrial fibrillation (HCC) POA: Yes    Acquired hypothyroidism POA: Yes    Chronic pain syndrome POA: Yes    Encephalopathy POA: Yes    Closed nondisplaced fracture of acromial end of right clavicle POA: Yes    Infection of lumbar spine (HCC) POA: Yes    Advance care planning POA: Yes  Resolved Problems:    Sepsis (HCC) POA: Yes      FOLLOW UP  Follow-up with [facility] hospice      MEDICATIONS ON DISCHARGE     Medication List      CONTINUE taking these medications      Instructions   levothyroxine 50 MCG Tabs  Commonly known as: SYNTHROID   Take  by mouth  every day.     metoprolol tartrate 25 MG Tabs  Commonly known as: LOPRESSOR   Take 1 Tab by mouth 2 times a day.  Dose: 25 mg     rivaroxaban 20 MG Tabs tablet  Commonly known as: XARELTO   Take 20 mg by mouth with dinner.  Dose: 20 mg        STOP taking these medications    diazePAM 5 MG Tabs  Commonly known as: VALIUM     gabapentin 300 MG Caps  Commonly known as: NEURONTIN     predniSONE 5 MG Tabs  Commonly known as: DELTASONE        ASK your doctor about these medications      Instructions   oxyCODONE immediate-release 5 MG Tabs  Commonly known as: ROXICODONE  Ask about: Should I take this medication?   Take 1 Tablet by mouth every 6 hours as needed for up to 5 days.  Dose: 5 mg            Allergies  Allergies   Allergen Reactions   • Tetracycline Anaphylaxis and Hives       DIET  Orders Placed This Encounter   Procedures   • Diet Order Diet: Regular     Standing Status:   Standing     Number of Occurrences:   1     Order Specific Question:   Diet:     Answer:   Regular [1]       ACTIVITY  As tolerated.  Weight bearing as tolerated    CONSULTATIONS  Ortho  Oncology    PROCEDURES  None    LABORATORY  Lab Results   Component Value Date    SODIUM 139 10/30/2021    POTASSIUM 3.4 (L) 10/30/2021    CHLORIDE 104 10/30/2021    CO2 26 10/30/2021    GLUCOSE 100 (H) 10/30/2021    BUN 9 10/30/2021    CREATININE 0.53 10/30/2021    CREATININE 0.9 09/18/2008        Lab Results   Component Value Date    WBC 6.5 10/30/2021    HEMOGLOBIN 10.9 (L) 10/30/2021    HEMATOCRIT 35.6 (L) 10/30/2021    PLATELETCT 349 10/30/2021        I discussed medications and side effects with the patient.  I discussed prognosis and importance of medical compliance with the patient.  I counseled the patient about diet, exercise, weight loss, smoking cessation, and life style modifications.  All questions and concerns have been addressed.  Total time of the discharge process was 37 minutes.

## 2021-11-10 ENCOUNTER — HOSPITAL ENCOUNTER (OUTPATIENT)
Dept: RADIOLOGY | Facility: MEDICAL CENTER | Age: 81
End: 2021-11-10
Payer: MEDICARE
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
